# Patient Record
Sex: MALE | Race: WHITE | NOT HISPANIC OR LATINO | Employment: OTHER | URBAN - METROPOLITAN AREA
[De-identification: names, ages, dates, MRNs, and addresses within clinical notes are randomized per-mention and may not be internally consistent; named-entity substitution may affect disease eponyms.]

---

## 2017-01-11 ENCOUNTER — LAB VISIT (OUTPATIENT)
Dept: LAB | Facility: HOSPITAL | Age: 72
End: 2017-01-11
Attending: FAMILY MEDICINE
Payer: MEDICARE

## 2017-01-11 DIAGNOSIS — E78.5 HYPERLIPIDEMIA, UNSPECIFIED HYPERLIPIDEMIA TYPE: ICD-10-CM

## 2017-01-11 LAB
ALBUMIN SERPL BCP-MCNC: 3.8 G/DL
ALP SERPL-CCNC: 55 U/L
ALT SERPL W/O P-5'-P-CCNC: 26 U/L
ANION GAP SERPL CALC-SCNC: 5 MMOL/L
AST SERPL-CCNC: 22 U/L
BILIRUB SERPL-MCNC: 0.4 MG/DL
BUN SERPL-MCNC: 20 MG/DL
CALCIUM SERPL-MCNC: 9.5 MG/DL
CHLORIDE SERPL-SCNC: 107 MMOL/L
CHOLEST/HDLC SERPL: 2.5 {RATIO}
CO2 SERPL-SCNC: 30 MMOL/L
CREAT SERPL-MCNC: 1 MG/DL
EST. GFR  (AFRICAN AMERICAN): >60 ML/MIN/1.73 M^2
EST. GFR  (NON AFRICAN AMERICAN): >60 ML/MIN/1.73 M^2
GLUCOSE SERPL-MCNC: 93 MG/DL
HDL/CHOLESTEROL RATIO: 40.2 %
HDLC SERPL-MCNC: 132 MG/DL
HDLC SERPL-MCNC: 53 MG/DL
LDLC SERPL CALC-MCNC: 67.4 MG/DL
NONHDLC SERPL-MCNC: 79 MG/DL
POTASSIUM SERPL-SCNC: 4.6 MMOL/L
PROT SERPL-MCNC: 7.1 G/DL
SODIUM SERPL-SCNC: 142 MMOL/L
TRIGL SERPL-MCNC: 58 MG/DL

## 2017-01-11 PROCEDURE — 36415 COLL VENOUS BLD VENIPUNCTURE: CPT | Mod: PO

## 2017-01-11 PROCEDURE — 80061 LIPID PANEL: CPT

## 2017-01-11 PROCEDURE — 80053 COMPREHEN METABOLIC PANEL: CPT

## 2017-01-16 ENCOUNTER — OFFICE VISIT (OUTPATIENT)
Dept: FAMILY MEDICINE | Facility: CLINIC | Age: 72
End: 2017-01-16
Payer: MEDICARE

## 2017-01-16 VITALS
TEMPERATURE: 98 F | DIASTOLIC BLOOD PRESSURE: 70 MMHG | WEIGHT: 168 LBS | HEART RATE: 86 BPM | HEIGHT: 66 IN | OXYGEN SATURATION: 98 % | BODY MASS INDEX: 27 KG/M2 | SYSTOLIC BLOOD PRESSURE: 136 MMHG

## 2017-01-16 DIAGNOSIS — J44.9 CHRONIC OBSTRUCTIVE PULMONARY DISEASE, UNSPECIFIED COPD TYPE: Primary | ICD-10-CM

## 2017-01-16 DIAGNOSIS — F17.200 TOBACCO USE DISORDER: ICD-10-CM

## 2017-01-16 DIAGNOSIS — E78.5 HYPERLIPIDEMIA, UNSPECIFIED HYPERLIPIDEMIA TYPE: ICD-10-CM

## 2017-01-16 PROCEDURE — 99214 OFFICE O/P EST MOD 30 MIN: CPT | Mod: S$GLB,,, | Performed by: FAMILY MEDICINE

## 2017-01-16 PROCEDURE — 1159F MED LIST DOCD IN RCRD: CPT | Mod: S$GLB,,, | Performed by: FAMILY MEDICINE

## 2017-01-16 PROCEDURE — 1126F AMNT PAIN NOTED NONE PRSNT: CPT | Mod: S$GLB,,, | Performed by: FAMILY MEDICINE

## 2017-01-16 PROCEDURE — 1157F ADVNC CARE PLAN IN RCRD: CPT | Mod: S$GLB,,, | Performed by: FAMILY MEDICINE

## 2017-01-16 PROCEDURE — 99499 UNLISTED E&M SERVICE: CPT | Mod: S$GLB,,, | Performed by: FAMILY MEDICINE

## 2017-01-16 PROCEDURE — 99999 PR PBB SHADOW E&M-EST. PATIENT-LVL III: CPT | Mod: PBBFAC,,, | Performed by: FAMILY MEDICINE

## 2017-01-16 PROCEDURE — 1160F RVW MEDS BY RX/DR IN RCRD: CPT | Mod: S$GLB,,, | Performed by: FAMILY MEDICINE

## 2017-01-16 RX ORDER — TRIAMCINOLONE ACETONIDE 1 MG/G
CREAM TOPICAL
Refills: 1 | COMMUNITY
Start: 2017-01-11 | End: 2017-12-21

## 2017-01-16 RX ORDER — FLUTICASONE FUROATE AND VILANTEROL 200; 25 UG/1; UG/1
1 POWDER RESPIRATORY (INHALATION) DAILY
Qty: 60 EACH | Refills: 11 | Status: SHIPPED | OUTPATIENT
Start: 2017-01-16 | End: 2018-02-11 | Stop reason: SDUPTHER

## 2017-01-16 RX ORDER — TIOTROPIUM BROMIDE 18 UG/1
18 CAPSULE ORAL; RESPIRATORY (INHALATION) DAILY
Qty: 30 CAPSULE | Refills: 11 | Status: SHIPPED | OUTPATIENT
Start: 2017-01-16 | End: 2018-01-14 | Stop reason: SDUPTHER

## 2017-01-16 NOTE — PROGRESS NOTES
Subjective:       Patient ID: Castillo Anderson Jr. is a 71 y.o. male.    Chief Complaint: Hyperlipidemia (3 month follow up with labs prior)    HPI     Reviewed recent labs.  Lipids wnl.      Smoker since age 15.  Smokes 1 pack per day.  Has had a difficult time quitting.      Coughing up clear phelgm with wheeze x 2 years.            Review of Systems      Review of Systems   Constitutional: Negative for fever and chills.   HENT: Negative for hearing loss and neck stiffness.    Eyes: Negative for redness and itching.   Respiratory: Negative for choking.    Cardiovascular: Negative for chest pain and leg swelling.  Abdomen: Negative for abdominal pain and blood in stool.   Genitourinary: Negative for dysuria and flank pain.   Musculoskeletal: Negative for back pain and gait problem.   Neurological: Negative for light-headedness and headaches.   Hematological: Negative for adenopathy.   Psychiatric/Behavioral: Negative for behavioral problems.     Objective:      Physical Exam   HENT:   Head: Atraumatic.   Eyes: Conjunctivae are normal. Pupils are equal, round, and reactive to light.   Neck: Normal range of motion.   Cardiovascular: Normal rate and regular rhythm.    No murmur heard.  Pulmonary/Chest: Effort normal. He has wheezes.   Lymphadenopathy:     He has no cervical adenopathy.       Lab Visit on 01/11/2017   Component Date Value Ref Range Status    Sodium 01/11/2017 142  136 - 145 mmol/L Final    Potassium 01/11/2017 4.6  3.5 - 5.1 mmol/L Final    Chloride 01/11/2017 107  95 - 110 mmol/L Final    CO2 01/11/2017 30* 23 - 29 mmol/L Final    Glucose 01/11/2017 93  70 - 110 mg/dL Final    BUN, Bld 01/11/2017 20  8 - 23 mg/dL Final    Creatinine 01/11/2017 1.0  0.5 - 1.4 mg/dL Final    Calcium 01/11/2017 9.5  8.7 - 10.5 mg/dL Final    Total Protein 01/11/2017 7.1  6.0 - 8.4 g/dL Final    Albumin 01/11/2017 3.8  3.5 - 5.2 g/dL Final    Total Bilirubin 01/11/2017 0.4  0.1 - 1.0 mg/dL Final    Comment: For  infants and newborns, interpretation of results should be based  on gestational age, weight and in agreement with clinical  observations.  Premature Infant recommended reference ranges:  Up to 24 hours.............<8.0 mg/dL  Up to 48 hours............<12.0 mg/dL  3-5 days..................<15.0 mg/dL  6-29 days.................<15.0 mg/dL      Alkaline Phosphatase 01/11/2017 55  55 - 135 U/L Final    AST 01/11/2017 22  10 - 40 U/L Final    ALT 01/11/2017 26  10 - 44 U/L Final    Anion Gap 01/11/2017 5* 8 - 16 mmol/L Final    eGFR if African American 01/11/2017 >60.0  >60 mL/min/1.73 m^2 Final    eGFR if non African American 01/11/2017 >60.0  >60 mL/min/1.73 m^2 Final    Comment: Calculation used to obtain the estimated glomerular filtration  rate (eGFR) is the CKD-EPI equation. Since race is unknown   in our information system, the eGFR values for   -American and Non--American patients are given   for each creatinine result.      Cholesterol 01/11/2017 132  120 - 199 mg/dL Final    Comment: The National Cholesterol Education Program (NCEP) has set the  following guidelines (reference ranges) for Cholesterol:  Optimal.....................<200 mg/dL  Borderline High.............200-239 mg/dL  High........................> or = 240 mg/dL      Triglycerides 01/11/2017 58  30 - 150 mg/dL Final    Comment: The National Cholesterol Education Program (NCEP) has set the  following guidelines (reference values) for triglycerides:  Normal......................<150 mg/dL  Borderline High.............150-199 mg/dL  High........................200-499 mg/dL      HDL 01/11/2017 53  40 - 75 mg/dL Final    Comment: The National Cholesterol Education Program (NCEP) has set the  following guidelines (reference values) for HDL Cholesterol:  Low...............<40 mg/dL  Optimal...........>60 mg/dL      LDL Cholesterol 01/11/2017 67.4  63.0 - 159.0 mg/dL Final    Comment: The National Cholesterol Education  Program (NCEP) has set the  following guidelines (reference values) for LDL Cholesterol:  Optimal.......................<130 mg/dL  Borderline High...............130-159 mg/dL  High..........................160-189 mg/dL  Very High.....................>190 mg/dL      HDL/Chol Ratio 01/11/2017 40.2  20.0 - 50.0 % Final    Total Cholesterol/HDL Ratio 01/11/2017 2.5  2.0 - 5.0 Final    Non-HDL Cholesterol 01/11/2017 79  mg/dL Final    Comment: Risk category and Non-HDL cholesterol goals:  Coronary heart disease (CHD)or equivalent (10-year risk of CHD >20%):  Non-HDL cholesterol goal     <130 mg/dL  Two or more CHD risk factors and 10-year risk of CHD <= 20%:  Non-HDL cholesterol goal     <160 mg/dL  0 to 1 CHD risk factor:  Non-HDL cholesterol goal     <190 mg/dL             Assessment:       1. Chronic obstructive pulmonary disease, unspecified COPD type    2. Tobacco use disorder    3. Hyperlipidemia, unspecified hyperlipidemia type        Plan:       Chronic obstructive pulmonary disease, unspecified COPD type  -     CT Chest Lung Screening Low Dose; Future; Expected date: 1/16/17    Tobacco use disorder  -     CT Chest Lung Screening Low Dose; Future; Expected date: 1/16/17    Hyperlipidemia, unspecified hyperlipidemia type    Other orders  -     tiotropium (SPIRIVA) 18 mcg inhalation capsule; Inhale 1 capsule (18 mcg total) into the lungs once daily.  Dispense: 30 capsule; Refill: 11  -     fluticasone-vilanterol (BREO ELLIPTA) 200-25 mcg/dose DsDv diskus inhaler; Inhale 1 puff into the lungs once daily.  Dispense: 60 each; Refill: 11    plan:  See order  Start spiriva and breo

## 2017-01-16 NOTE — MR AVS SNAPSHOT
[facility]  [address] [address]  [facility] [address]  Phone: [phone]  Fax: [phone]                  Castillo Anderson Jr.   2017 9:00 AM   Office Visit    Description:  Male : 1945   Provider:  Frantz Lemos MD   Department:  [facility]           Reason for Visit     Hyperlipidemia           Diagnoses this Visit        Comments    Chronic obstructive pulmonary disease, unspecified COPD type    -  Primary     Tobacco use disorder         Hyperlipidemia, unspecified hyperlipidemia type                To Do List           Future Appointments        Provider Department Dept Phone    2017 8:30 AM [facility] CT1 LIMIT [address] [facility] [phone]    2017 8:00 AM Frantz Lemos MD [facility] [phone]      Goals (5 Years of Data)     None      Follow-Up and Disposition     Return in about 3 months (around 2017).       These Medications        Disp Refills Start End    tiotropium (SPIRIVA) 18 mcg inhalation capsule 30 capsule 11 2017    Inhale 1 capsule (18 mcg total) into the lungs once daily. - Inhalation    Pharmacy: [facility] [address] [place] [address] AT [facility] [address] [facility] [address] Ph #: [phone]       fluticasone-vilanterol (BREO ELLIPTA) 200-25 mcg/dose DsDv diskus inhaler 60 each 11 2017     Inhale 1 puff into the lungs once daily. - Inhalation    [facility] [id number] [place] [address] AT [facility] [address] & [facility] [address] Ph #: [phone]         [facility] On [facility]     [facility] Care [facility] -  Assistance  Registered nurses in the [facility] provide clinical advisement, health education, appointment booking, and other advisory services.  Call for this free service at [phone].             Medications           Message regarding Medications     Verify the changes and/or additions to your  medication regime listed below are the same as discussed with your clinician today.  If any of these changes or additions are incorrect, please notify your healthcare provider.        START taking these NEW medications        Refills    tiotropium (SPIRIVA) 18 mcg inhalation capsule 11    Sig: Inhale 1 capsule (18 mcg total) into the lungs once daily.    Class: Normal    Route: Inhalation    fluticasone-vilanterol (BREO ELLIPTA) 200-25 mcg/dose DsDv diskus inhaler 11    Sig: Inhale 1 puff into the lungs once daily.    Class: Normal    Route: Inhalation      STOP taking these medications     ondansetron (ZOFRAN-ODT) 8 MG TbDL     oxycodone-acetaminophen (PERCOCET) 5-325 mg per tablet            Verify that the below list of medications is an accurate representation of the medications you are currently taking.  If none reported, the list may be blank. If incorrect, please contact your healthcare provider. Carry this list with you in case of emergency.           Current Medications     aspirin (ECOTRIN) 81 MG EC tablet Take 81 mg by mouth every evening.     duloxetine (CYMBALTA) 60 MG capsule Take 60 mg by mouth once daily.    Instructed to take morning of surgery.    multivitamin (THERAGRAN) per tablet Take 1 tablet by mouth once daily.    rosuvastatin (CRESTOR) 20 MG tablet TAKE 1 TABLET ONE TIME DAILY    trazodone (DESYREL) 50 MG tablet Take 50 mg by mouth every evening. Every day    triamcinolone acetonide 0.1% (KENALOG) 0.1 % cream ELENA AA BID FOR 1 MONTH    ZETIA 10 mg tablet TAKE 1 TABLET ONE TIME DAILY    fluticasone-vilanterol (BREO ELLIPTA) 200-25 mcg/dose DsDv diskus inhaler Inhale 1 puff into the lungs once daily.    tiotropium (SPIRIVA) 18 mcg inhalation capsule Inhale 1 capsule (18 mcg total) into the lungs once daily.           Clinical Reference Information           Vital Signs - Last Recorded  Most recent update: 1/16/2017  9:10 AM by Loretta Persaud MA    BP Pulse Temp Ht Wt SpO2    136/70 86 97.8  "°F (36.6 °C) (Oral) 5' 6" (1.676 m) 76.2 kg (167 lb 15.9 oz) 98%    BMI                27.11 kg/m2          Blood Pressure          Most Recent Value    BP  136/70      Allergies as of 1/16/2017     No Known Drug Allergies      Immunizations Administered on Date of Encounter - 1/16/2017     None      Orders Placed During Today's Visit     Future Labs/Procedures Expected by Expires    CT Chest Lung Screening Low Dose  1/16/2017 1/16/2018      Smoking Cessation     If you would like to quit smoking:   You may be eligible for free services if you are a [place] resident and started smoking cigarettes before September 1, 1988.  Call the Smoking Cessation Trust (SCT) toll free at ([phone] 187-5476 or [phone] [phone].   Call 0-958-QUIT-NOW if you do not meet the above criteria.            "

## 2017-01-25 ENCOUNTER — HOSPITAL ENCOUNTER (OUTPATIENT)
Dept: RADIOLOGY | Facility: HOSPITAL | Age: 72
Discharge: HOME | End: 2017-01-25
Attending: FAMILY MEDICINE

## 2017-01-25 DIAGNOSIS — F17.200 TOBACCO USE DISORDER: ICD-10-CM

## 2017-01-25 DIAGNOSIS — J44.9 CHRONIC OBSTRUCTIVE PULMONARY DISEASE, UNSPECIFIED COPD TYPE: ICD-10-CM

## 2017-01-25 PROCEDURE — 76497 UNLISTED CT PROCEDURE: CPT | Mod: TC,PO

## 2017-01-27 ENCOUNTER — TELEPHONE (OUTPATIENT)
Dept: FAMILY MEDICINE | Facility: CLINIC | Age: 72
End: 2017-01-27

## 2017-01-27 ENCOUNTER — PATIENT MESSAGE (OUTPATIENT)
Dept: FAMILY MEDICINE | Facility: CLINIC | Age: 72
End: 2017-01-27

## 2017-01-27 NOTE — TELEPHONE ENCOUNTER
inform pt via phone that I reviewed the test results and note the followin.  Imaging findings suggest chronic obstructive airways disease/early emphysematous lung architecture with multiple scattered pulmonary nodules which range in size from 2-6.5 mm.  A followup examination in 3-6 months then at 9-12 months and 24 months (if no change) is recommended.    2.  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

## 2017-01-28 NOTE — TELEPHONE ENCOUNTER
Where does he go from here should he see a pulmonologist and what about the kidney and other areas wants to know what to do ? Not sure about while he is waiting to see you if he should see other specialist? Please advise ty

## 2017-01-30 ENCOUNTER — TELEPHONE (OUTPATIENT)
Dept: FAMILY MEDICINE | Facility: CLINIC | Age: 72
End: 2017-01-30

## 2017-01-30 NOTE — TELEPHONE ENCOUNTER
inform pt via phone:    He does not need to see a pulmonologist or nephrologist.      He does need to quit smoking. Please give him info on smoking cessation program.

## 2017-02-07 ENCOUNTER — CLINICAL SUPPORT (OUTPATIENT)
Dept: SMOKING CESSATION | Facility: CLINIC | Age: 72
End: 2017-02-07
Payer: COMMERCIAL

## 2017-02-07 DIAGNOSIS — F17.210 MODERATE SMOKER (20 OR LESS PER DAY): Primary | ICD-10-CM

## 2017-02-07 PROCEDURE — 99404 PREV MED CNSL INDIV APPRX 60: CPT | Mod: S$GLB,,, | Performed by: GENERAL PRACTICE

## 2017-02-07 PROCEDURE — 99999 PR PBB SHADOW E&M-EST. PATIENT-LVL II: CPT | Mod: PBBFAC,,,

## 2017-02-07 RX ORDER — VARENICLINE TARTRATE 0.5 (11)-1
KIT ORAL
Qty: 1 PACKAGE | Refills: 0 | Status: SHIPPED | OUTPATIENT
Start: 2017-02-07 | End: 2017-03-07

## 2017-02-07 RX ORDER — IBUPROFEN 200 MG
1 TABLET ORAL DAILY
Qty: 14 PATCH | Refills: 0 | Status: SHIPPED | OUTPATIENT
Start: 2017-02-07 | End: 2017-02-21 | Stop reason: ALTCHOICE

## 2017-02-07 NOTE — Clinical Note
Patient will be participating in weekly tobacco cessation meetings and will begin the prescribed tobacco cessation medication regime of the Chantix starter pack and a 21 mg patch. Patient has used Chantix,patches,lozenges before.

## 2017-02-07 NOTE — PROGRESS NOTES
Patient was seen for tobacco cessation quit 1. He is currently smoking 1 ppd and has had a successful quit for 10 months many years ago. He has used Chantix before and had vivid dreams but took it close to bedtime. Patient has also used patches,lozenges,hypnosis,reduction and cold turkey. He states none of these were effective but did find Chantix effective. Patient is motivated to quit due to health and cost. Patient has accepted invitation to group starting 2/22/17 and was prescribed Chantix starter box and a 21 mg patch.

## 2017-02-14 DIAGNOSIS — I65.23 BILATERAL CAROTID ARTERY STENOSIS: Primary | ICD-10-CM

## 2017-02-21 ENCOUNTER — CLINICAL SUPPORT (OUTPATIENT)
Dept: SMOKING CESSATION | Facility: CLINIC | Age: 72
End: 2017-02-21
Payer: COMMERCIAL

## 2017-02-21 DIAGNOSIS — F17.210 MODERATE SMOKER (20 OR LESS PER DAY): Primary | ICD-10-CM

## 2017-02-21 PROCEDURE — 99999 PR PBB SHADOW E&M-EST. PATIENT-LVL II: CPT | Mod: PBBFAC,,,

## 2017-02-21 PROCEDURE — 99407 BEHAV CHNG SMOKING > 10 MIN: CPT | Mod: S$GLB,,, | Performed by: GENERAL PRACTICE

## 2017-02-22 ENCOUNTER — CLINICAL SUPPORT (OUTPATIENT)
Dept: SMOKING CESSATION | Facility: CLINIC | Age: 72
End: 2017-02-22
Payer: COMMERCIAL

## 2017-02-22 DIAGNOSIS — F17.210 MODERATE SMOKER (20 OR LESS PER DAY): Primary | ICD-10-CM

## 2017-02-22 PROCEDURE — 99999 PR PBB SHADOW E&M-EST. PATIENT-LVL II: CPT | Mod: PBBFAC,,,

## 2017-02-22 PROCEDURE — 90853 GROUP PSYCHOTHERAPY: CPT | Mod: S$GLB,,, | Performed by: GENERAL PRACTICE

## 2017-02-22 NOTE — Clinical Note
Patient is currently smoking 15 cigarettes per day and taking Chantix 1 mg BID with no negative side effects at this time. Patient will use reduction and delay to achieve his quit.

## 2017-02-22 NOTE — PROGRESS NOTES
Site: [facility]  Date:  2/22/2017  Clinical Status of Patient: Outpatient   Length of Service and Code: 60 minutes - 98419   Number in Attendance: 7  Group Activities/Focus of Group:  orientation, client introductions, completion of TCRS (Tobacco Cessation Rating Scale) learned addiction model, cues/triggers, personal reasons for quitting, medications, goals, quit date    Target symptoms:  withdrawal and medication side effects             The following were rated moderate (3) to severe (4) on TCRS:       Moderate 3: none     Severe 4:   none  Patient's Response to Intervention: Patient is currently smoking 15 cigarettes per day and taking Chantix 1 mg BID with no negative side effects at this time. Patient will use reduction and delay to achieve his quit.  Progress Toward Goals and Other Mental Status Changes: The patient denies any abnormal behavioral or mental changes at this time.         Diagnosis: Z72.0  Plan: The patient will continue with group therapy sessions and medication monitoring by CTTS. Prescribed medication management will be by physician.   Return to Clinic: 1 week

## 2017-03-01 ENCOUNTER — HOSPITAL ENCOUNTER (OUTPATIENT)
Dept: RADIOLOGY | Facility: HOSPITAL | Age: 72
Discharge: HOME | End: 2017-03-01
Attending: THORACIC SURGERY (CARDIOTHORACIC VASCULAR SURGERY)
Payer: MEDICARE

## 2017-03-01 DIAGNOSIS — I65.23 BILATERAL CAROTID ARTERY STENOSIS: ICD-10-CM

## 2017-03-01 PROCEDURE — 93880 EXTRACRANIAL BILAT STUDY: CPT | Mod: TC,PO

## 2017-03-01 PROCEDURE — 93880 EXTRACRANIAL BILAT STUDY: CPT | Mod: 26,,, | Performed by: RADIOLOGY

## 2017-03-02 ENCOUNTER — TELEPHONE (OUTPATIENT)
Dept: SMOKING CESSATION | Facility: CLINIC | Age: 72
End: 2017-03-02

## 2017-03-08 ENCOUNTER — TELEPHONE (OUTPATIENT)
Dept: SMOKING CESSATION | Facility: CLINIC | Age: 72
End: 2017-03-08

## 2017-03-15 ENCOUNTER — TELEPHONE (OUTPATIENT)
Dept: SMOKING CESSATION | Facility: CLINIC | Age: 72
End: 2017-03-15

## 2017-04-19 ENCOUNTER — TELEPHONE (OUTPATIENT)
Dept: VASCULAR SURGERY | Facility: CLINIC | Age: 72
End: 2017-04-19

## 2017-04-19 NOTE — TELEPHONE ENCOUNTER
----- Message from Ligia Street sent at 4/19/2017  1:39 PM CDT -----  Having issues corroded artery / call [phone] states urgent

## 2017-04-24 ENCOUNTER — TELEPHONE (OUTPATIENT)
Dept: VASCULAR SURGERY | Facility: CLINIC | Age: 72
End: 2017-04-24

## 2017-04-24 NOTE — TELEPHONE ENCOUNTER
----- Message from Ute Vallejo sent at 4/24/2017  4:08 PM CDT -----  Returning your call.  Please call patient at [phone].

## 2017-04-25 ENCOUNTER — OFFICE VISIT (OUTPATIENT)
Dept: FAMILY MEDICINE | Facility: CLINIC | Age: 72
End: 2017-04-25
Payer: MEDICARE

## 2017-04-25 VITALS
HEART RATE: 75 BPM | DIASTOLIC BLOOD PRESSURE: 72 MMHG | HEIGHT: 66 IN | SYSTOLIC BLOOD PRESSURE: 124 MMHG | BODY MASS INDEX: 26.36 KG/M2 | OXYGEN SATURATION: 99 % | WEIGHT: 164 LBS

## 2017-04-25 DIAGNOSIS — E78.5 HYPERLIPIDEMIA, UNSPECIFIED HYPERLIPIDEMIA TYPE: ICD-10-CM

## 2017-04-25 DIAGNOSIS — F41.9 ANXIETY: ICD-10-CM

## 2017-04-25 DIAGNOSIS — J44.9 CHRONIC OBSTRUCTIVE PULMONARY DISEASE, UNSPECIFIED COPD TYPE: Primary | ICD-10-CM

## 2017-04-25 DIAGNOSIS — F17.200 TOBACCO USE DISORDER: ICD-10-CM

## 2017-04-25 PROCEDURE — 1160F RVW MEDS BY RX/DR IN RCRD: CPT | Mod: S$GLB,,, | Performed by: FAMILY MEDICINE

## 2017-04-25 PROCEDURE — 99214 OFFICE O/P EST MOD 30 MIN: CPT | Mod: 25,S$GLB,, | Performed by: FAMILY MEDICINE

## 2017-04-25 PROCEDURE — 99999 PR PBB SHADOW E&M-EST. PATIENT-LVL III: CPT | Mod: PBBFAC,,, | Performed by: FAMILY MEDICINE

## 2017-04-25 PROCEDURE — 90670 PCV13 VACCINE IM: CPT | Mod: S$GLB,,, | Performed by: FAMILY MEDICINE

## 2017-04-25 PROCEDURE — 1126F AMNT PAIN NOTED NONE PRSNT: CPT | Mod: S$GLB,,, | Performed by: FAMILY MEDICINE

## 2017-04-25 PROCEDURE — 99499 UNLISTED E&M SERVICE: CPT | Mod: S$GLB,,, | Performed by: FAMILY MEDICINE

## 2017-04-25 PROCEDURE — 1159F MED LIST DOCD IN RCRD: CPT | Mod: S$GLB,,, | Performed by: FAMILY MEDICINE

## 2017-04-25 PROCEDURE — G0009 ADMIN PNEUMOCOCCAL VACCINE: HCPCS | Mod: S$GLB,,, | Performed by: FAMILY MEDICINE

## 2017-04-25 NOTE — MR AVS SNAPSHOT
[facility]  [address]  [place] [address]  Phone: [phone]  Fax: [phone]                  Castillo Anderson Jr.   2017 8:00 AM   Office Visit    Description:  Male : 1945   Provider:  Frantz Lemos MD   Department:  [facility]           Reason for Visit     COPD           Diagnoses this Visit        Comments    Chronic obstructive pulmonary disease, unspecified COPD type    -  Primary     Tobacco use disorder         Anxiety         Hyperlipidemia, unspecified hyperlipidemia type                To Do List           Goals (5 Years of Data)     None      Follow-Up and Disposition     Return in about 1 year (around 2018).      [facility] On Call     [facility] Nurse Care Line -  Assistance  Unless otherwise directed by your provider, please contact [facility], our nurse care line that is available for  assistance.     Registered nurses in the [facility] provide: appointment scheduling, clinical advisement, health education, and other advisory services.  Call: [phone] (toll free)               Medications           Message regarding Medications     Verify the changes and/or additions to your medication regime listed below are the same as discussed with your clinician today.  If any of these changes or additions are incorrect, please notify your healthcare provider.        STOP taking these medications     aspirin (ECOTRIN) 81 MG EC tablet Take 81 mg by mouth every evening.            Verify that the below list of medications is an accurate representation of the medications you are currently taking.  If none reported, the list may be blank. If incorrect, please contact your healthcare provider. Carry this list with you in case of emergency.           Current Medications     fluticasone-vilanterol (BREO ELLIPTA) 200-25 mcg/dose DsDv diskus inhaler Inhale 1 puff into the lungs once daily.    multivitamin  "(THERAGRAN) per tablet Take 1 tablet by mouth once daily.    rosuvastatin (CRESTOR) 20 MG tablet TAKE 1 TABLET ONE TIME DAILY    tiotropium (SPIRIVA) 18 mcg inhalation capsule Inhale 1 capsule (18 mcg total) into the lungs once daily.    trazodone (DESYREL) 50 MG tablet Take 50 mg by mouth every evening. Every day    triamcinolone acetonide 0.1% (KENALOG) 0.1 % cream ELENA AA BID FOR 1 MONTH    ZETIA 10 mg tablet TAKE 1 TABLET ONE TIME DAILY    duloxetine (CYMBALTA) 60 MG capsule Take 60 mg by mouth once daily.    Instructed to take morning of surgery.           Clinical Reference Information           Your Vitals Were     BP Pulse Height Weight SpO2 BMI    124/72 75 5' 6" (1.676 m) 74.4 kg (164 lb 0.4 oz) 99% 26.47 kg/m2      Blood Pressure          Most Recent Value    BP  124/72      Allergies as of 4/25/2017     No Known Drug Allergies      Immunizations Administered on Date of Encounter - 4/25/2017     Name Date Dose VIS Date Route    Pneumococcal Conjugate - 13 Valent 4/25/2017 0.5 mL 11/5/2015 Intramuscular      Orders Placed During Today's Visit      Normal Orders This Visit    Pneumococcal Conjugate Vaccine (13 Valent) (IM)       Smoking Cessation     If you would like to quit smoking:   You may be eligible for free services if you are a [place] resident and started smoking cigarettes before September 1, 1988.  Call the Smoking Cessation Trust ([facility]) toll free at ([phone] 257-7336 or (999) 517-9435.   Call 1-800-QUIT-NOW if you do not meet the above criteria.   Contact us via email: [email or web address]   View our website for more information: [email or web address]        Language Assistance Services     ATTENTION: Language assistance services are available, free of charge. Please call [phone].      ATENCIÓN: Si habla español, tiene a dean disposición servicios gratuitos de asistencia lingüística. Llame al [phone].     CHÚ Ý: N?u b?n nói Ti?ng Vi?t, có các d?ch v? h? tr? ngôn ng? mi?n " phí dành cho b?n. G?i s? [phone].         [facility] complies with applicable Federal civil rights laws and does not discriminate on the basis of race, color, national origin, age, disability, or sex.

## 2017-04-25 NOTE — PROGRESS NOTES
Subjective:       Patient ID: Castillo Anderson Jr. is a 71 y.o. male.    Chief Complaint: COPD    HPI     Smoker since age 15. Smokes 1 pack per day. Started him on spiriva and breo 3 months ago.  Reports less frequent dry cough. No wheeze.     Seeing Dr. Doron Cisneros, psychiatrist, for anxiety management. On cymbalta and trazodone.       Review of Systems      Review of Systems   Constitutional: Negative for fever and chills.   HENT: Negative for hearing loss and neck stiffness.    Eyes: Negative for redness and itching.   Respiratory: Negative for choking.    Cardiovascular: Negative for chest pain and leg swelling.  Abdomen: Negative for abdominal pain and blood in stool.   Genitourinary: Negative for dysuria and flank pain.   Musculoskeletal: Negative for back pain and gait problem.   Neurological: Negative for light-headedness and headaches.   Hematological: Negative for adenopathy.   Psychiatric/Behavioral: Negative for behavioral problems.     Objective:      Physical Exam   HENT:   Head: Atraumatic.   Eyes: Conjunctivae are normal. Pupils are equal, round, and reactive to light.   Neck: Normal range of motion.   Cardiovascular: Normal rate and regular rhythm.    No murmur heard.  Pulmonary/Chest: Effort normal and breath sounds normal. He has no wheezes.   Lymphadenopathy:     He has no cervical adenopathy.       Assessment:       1. Chronic obstructive pulmonary disease, unspecified COPD type    2. Tobacco use disorder    3. Anxiety    4. Hyperlipidemia, unspecified hyperlipidemia type        Plan:       Chronic obstructive pulmonary disease, unspecified COPD type    Tobacco use disorder    Anxiety    Hyperlipidemia, unspecified hyperlipidemia type    Other orders  -     Pneumococcal Conjugate Vaccine (13 Valent) (IM)      Plan:  prevnar 13 today  Cont current meds

## 2017-07-10 ENCOUNTER — TELEPHONE (OUTPATIENT)
Dept: FAMILY MEDICINE | Facility: CLINIC | Age: 72
End: 2017-07-10

## 2017-07-10 NOTE — TELEPHONE ENCOUNTER
----- Message from Laurence Johnson sent at 7/10/2017  9:14 AM CDT -----  Contact: [phone]  Patient requesting a refill on spiriva and breo.    Patient will be using   [facility] [address] [place] [address] AT [facility] [address] [place] [id number]  [address] [place] [address]  Phone: [phone] Fax: [phone]    Please call patient at [phone]. Thanks!

## 2017-07-18 DIAGNOSIS — E78.5 HLD (HYPERLIPIDEMIA): ICD-10-CM

## 2017-07-19 RX ORDER — EZETIMIBE 10 MG
TABLET ORAL
Qty: 90 TABLET | Refills: 0 | Status: SHIPPED | OUTPATIENT
Start: 2017-07-19 | End: 2017-12-18 | Stop reason: SDUPTHER

## 2017-07-27 ENCOUNTER — TELEPHONE (OUTPATIENT)
Dept: VASCULAR SURGERY | Facility: CLINIC | Age: 72
End: 2017-07-27

## 2017-07-27 NOTE — TELEPHONE ENCOUNTER
----- Message from Lou Coto sent at 7/27/2017 10:20 AM CDT -----  Contact: pt  Pt is wanting to speak to you can be reached at [phone]

## 2017-08-14 ENCOUNTER — TELEPHONE (OUTPATIENT)
Dept: VASCULAR SURGERY | Facility: CLINIC | Age: 72
End: 2017-08-14

## 2017-08-14 NOTE — TELEPHONE ENCOUNTER
----- Message from Dakota Casas sent at 8/14/2017  8:32 AM CDT -----  Contact: same  Patient called in and requested a message be sent over regarding getting his test/lab results from a facility in [place] and wanted to make sure they were received.  Patient call back number is [phone]

## 2017-09-05 RX ORDER — ROSUVASTATIN CALCIUM 20 MG/1
TABLET, COATED ORAL
Qty: 90 TABLET | Refills: 3 | Status: SHIPPED | OUTPATIENT
Start: 2017-09-05 | End: 2018-02-28 | Stop reason: SDUPTHER

## 2017-12-18 DIAGNOSIS — E78.5 HLD (HYPERLIPIDEMIA): ICD-10-CM

## 2017-12-18 RX ORDER — EZETIMIBE 10 MG/1
TABLET ORAL
Qty: 90 TABLET | Refills: 3 | Status: SHIPPED | OUTPATIENT
Start: 2017-12-18 | End: 2018-10-30 | Stop reason: SDUPTHER

## 2017-12-21 ENCOUNTER — OFFICE VISIT (OUTPATIENT)
Dept: FAMILY MEDICINE | Facility: CLINIC | Age: 72
End: 2017-12-21
Payer: MEDICARE

## 2017-12-21 ENCOUNTER — LAB VISIT (OUTPATIENT)
Dept: LAB | Facility: HOSPITAL | Age: 72
End: 2017-12-21
Attending: FAMILY MEDICINE
Payer: MEDICARE

## 2017-12-21 VITALS
DIASTOLIC BLOOD PRESSURE: 70 MMHG | WEIGHT: 171.5 LBS | SYSTOLIC BLOOD PRESSURE: 130 MMHG | HEIGHT: 66 IN | BODY MASS INDEX: 27.56 KG/M2 | HEART RATE: 93 BPM | OXYGEN SATURATION: 98 %

## 2017-12-21 DIAGNOSIS — Z12.5 PROSTATE CANCER SCREENING: ICD-10-CM

## 2017-12-21 DIAGNOSIS — F32.A DEPRESSION, UNSPECIFIED DEPRESSION TYPE: ICD-10-CM

## 2017-12-21 DIAGNOSIS — J44.9 CHRONIC OBSTRUCTIVE PULMONARY DISEASE, UNSPECIFIED COPD TYPE: ICD-10-CM

## 2017-12-21 DIAGNOSIS — Z00.00 ROUTINE MEDICAL EXAM: Primary | ICD-10-CM

## 2017-12-21 LAB — COMPLEXED PSA SERPL-MCNC: 3.6 NG/ML

## 2017-12-21 PROCEDURE — 36415 COLL VENOUS BLD VENIPUNCTURE: CPT | Mod: PO

## 2017-12-21 PROCEDURE — 99397 PER PM REEVAL EST PAT 65+ YR: CPT | Mod: S$GLB,,, | Performed by: FAMILY MEDICINE

## 2017-12-21 PROCEDURE — 99999 PR PBB SHADOW E&M-EST. PATIENT-LVL III: CPT | Mod: PBBFAC,,, | Performed by: FAMILY MEDICINE

## 2017-12-21 PROCEDURE — 99499 UNLISTED E&M SERVICE: CPT | Mod: S$GLB,,, | Performed by: FAMILY MEDICINE

## 2017-12-21 PROCEDURE — 84153 ASSAY OF PSA TOTAL: CPT

## 2017-12-21 RX ORDER — CALCIPOTRIENE, BETAMETHASONE DIPROPIONATE 50; .643 UG/G; MG/G
OINTMENT TOPICAL
COMMUNITY
Start: 2017-12-11 | End: 2019-09-09 | Stop reason: HOSPADM

## 2017-12-21 RX ORDER — IBUPROFEN 800 MG/1
800 TABLET ORAL 2 TIMES DAILY
COMMUNITY
Start: 2017-12-19 | End: 2018-01-25 | Stop reason: ALTCHOICE

## 2017-12-21 RX ORDER — CLOPIDOGREL BISULFATE 75 MG/1
75 TABLET ORAL DAILY
COMMUNITY
Start: 2017-11-14 | End: 2019-03-28 | Stop reason: SDUPTHER

## 2017-12-21 NOTE — PROGRESS NOTES
Subjective:       Patient ID: Castillo Anderson Jr. is a 72 y.o. male.    Chief Complaint: Annual Exam    HPI     Here for a check up.    Copd stable. No worsening cough or wheeze    Depression stable.       Review of Systems      Review of Systems   Constitutional: Negative for fever and chills.   HENT: Negative for hearing loss and neck stiffness.    Eyes: Negative for redness and itching.   Respiratory: Negative for cough and choking.    Cardiovascular: Negative for chest pain and leg swelling.  Abdomen: Negative for abdominal pain and blood in stool.   Genitourinary: Negative for dysuria and flank pain.   Musculoskeletal: Negative for back pain and gait problem.   Neurological: Negative for light-headedness and headaches.   Hematological: Negative for adenopathy.   Psychiatric/Behavioral: Negative for behavioral problems.     Objective:      Physical Exam   Constitutional: He is oriented to person, place, and time. He appears well-developed and well-nourished.   HENT:   Head: Normocephalic and atraumatic.   Eyes: Conjunctivae are normal. Pupils are equal, round, and reactive to light.   Neck: Normal range of motion. Neck supple.   Cardiovascular: Normal rate, regular rhythm and normal heart sounds.  Exam reveals no friction rub.    No murmur heard.  Pulmonary/Chest: Effort normal and breath sounds normal.   Abdominal: Soft. Bowel sounds are normal. There is no tenderness.   Musculoskeletal: Normal range of motion.   Lymphadenopathy:     He has no cervical adenopathy.   Neurological: He is alert and oriented to person, place, and time. He has normal reflexes. No cranial nerve deficit. Coordination normal.   Skin: Skin is warm and dry.   Psychiatric: He has a normal mood and affect. His behavior is normal.       Assessment:       1. Routine medical exam    2. Prostate cancer screening    3. Chronic obstructive pulmonary disease, unspecified COPD type    4. Depression, unspecified depression type        Plan:        Routine medical exam    Prostate cancer screening  -     PSA, Screening; Future; Expected date: 12/21/2017    Chronic obstructive pulmonary disease, unspecified COPD type    Depression, unspecified depression type            Plan:  psa today  Cont current meds      Medication List with Changes/Refills   Current Medications    CALCIPOTRIENE-BETAMETHASONE (TACLONEX) OINTMENT        CLOPIDOGREL (PLAVIX) 75 MG TABLET    Take 75 mg by mouth once daily.    DULOXETINE (CYMBALTA) 60 MG CAPSULE    Take 60 mg by mouth once daily.    Instructed to take morning of surgery.    EZETIMIBE (ZETIA) 10 MG TABLET    TAKE 1 TABLET EVERY DAY    FLUTICASONE-VILANTEROL (BREO ELLIPTA) 200-25 MCG/DOSE DSDV DISKUS INHALER    Inhale 1 puff into the lungs once daily.    IBUPROFEN (ADVIL,MOTRIN) 800 MG TABLET    Take 800 mg by mouth 2 (two) times daily.    ROSUVASTATIN (CRESTOR) 20 MG TABLET    TAKE 1 TABLET BY MOUTH EVERY DAY    TIOTROPIUM (SPIRIVA) 18 MCG INHALATION CAPSULE    Inhale 1 capsule (18 mcg total) into the lungs once daily.    TRAZODONE (DESYREL) 50 MG TABLET    Take 50 mg by mouth every evening. Every day   Discontinued Medications    MULTIVITAMIN (THERAGRAN) PER TABLET    Take 1 tablet by mouth once daily.    TRIAMCINOLONE ACETONIDE 0.1% (KENALOG) 0.1 % CREAM    ELENA AA BID FOR 1 MONTH

## 2018-01-14 RX ORDER — TIOTROPIUM BROMIDE 18 UG/1
CAPSULE ORAL; RESPIRATORY (INHALATION)
Qty: 30 CAPSULE | Refills: 11 | Status: SHIPPED | OUTPATIENT
Start: 2018-01-14 | End: 2019-04-03 | Stop reason: SDUPTHER

## 2018-01-19 ENCOUNTER — TELEPHONE (OUTPATIENT)
Dept: SMOKING CESSATION | Facility: CLINIC | Age: 73
End: 2018-01-19

## 2018-01-23 ENCOUNTER — CLINICAL SUPPORT (OUTPATIENT)
Dept: SMOKING CESSATION | Facility: CLINIC | Age: 73
End: 2018-01-23
Payer: COMMERCIAL

## 2018-01-23 DIAGNOSIS — F17.200 NICOTINE DEPENDENCE: Primary | ICD-10-CM

## 2018-01-23 PROCEDURE — 99406 BEHAV CHNG SMOKING 3-10 MIN: CPT | Mod: S$GLB,,, | Performed by: INTERNAL MEDICINE

## 2018-01-25 ENCOUNTER — OFFICE VISIT (OUTPATIENT)
Dept: FAMILY MEDICINE | Facility: CLINIC | Age: 73
End: 2018-01-25
Payer: MEDICARE

## 2018-01-25 VITALS
WEIGHT: 169.56 LBS | SYSTOLIC BLOOD PRESSURE: 143 MMHG | BODY MASS INDEX: 27.25 KG/M2 | HEIGHT: 66 IN | DIASTOLIC BLOOD PRESSURE: 86 MMHG | HEART RATE: 101 BPM

## 2018-01-25 DIAGNOSIS — F17.200 TOBACCO DEPENDENCE: ICD-10-CM

## 2018-01-25 DIAGNOSIS — R91.8 PULMONARY NODULES: ICD-10-CM

## 2018-01-25 DIAGNOSIS — Z98.890 S/P CAROTID ENDARTERECTOMY: ICD-10-CM

## 2018-01-25 DIAGNOSIS — J44.9 CHRONIC OBSTRUCTIVE PULMONARY DISEASE, UNSPECIFIED COPD TYPE: ICD-10-CM

## 2018-01-25 DIAGNOSIS — I65.22 STENOSIS OF LEFT CAROTID ARTERY: ICD-10-CM

## 2018-01-25 DIAGNOSIS — I73.9 PVD (PERIPHERAL VASCULAR DISEASE): ICD-10-CM

## 2018-01-25 DIAGNOSIS — I70.0 ATHEROSCLEROSIS OF AORTA: ICD-10-CM

## 2018-01-25 DIAGNOSIS — I70.212 ATHEROSCLEROSIS OF NATIVE ARTERY OF LEFT LOWER EXTREMITY WITH INTERMITTENT CLAUDICATION: ICD-10-CM

## 2018-01-25 DIAGNOSIS — E78.5 HYPERLIPIDEMIA, UNSPECIFIED HYPERLIPIDEMIA TYPE: ICD-10-CM

## 2018-01-25 DIAGNOSIS — Z00.00 ENCOUNTER FOR PREVENTIVE HEALTH EXAMINATION: Primary | ICD-10-CM

## 2018-01-25 DIAGNOSIS — I65.22 ARTERIOSCLEROSIS OF LEFT CAROTID ARTERY: ICD-10-CM

## 2018-01-25 DIAGNOSIS — G47.33 OBSTRUCTIVE SLEEP APNEA ON CPAP: ICD-10-CM

## 2018-01-25 DIAGNOSIS — F33.1 MODERATE EPISODE OF RECURRENT MAJOR DEPRESSIVE DISORDER: ICD-10-CM

## 2018-01-25 PROCEDURE — G0439 PPPS, SUBSEQ VISIT: HCPCS | Mod: S$GLB,,, | Performed by: NURSE PRACTITIONER

## 2018-01-25 PROCEDURE — 99499 UNLISTED E&M SERVICE: CPT | Mod: S$GLB,,, | Performed by: NURSE PRACTITIONER

## 2018-01-25 PROCEDURE — 99999 PR PBB SHADOW E&M-EST. PATIENT-LVL V: CPT | Mod: PBBFAC,,, | Performed by: NURSE PRACTITIONER

## 2018-01-25 NOTE — PROGRESS NOTES
"Castillo Anderson presented for a  Medicare AWV and comprehensive Health Risk Assessment today. The following components were reviewed and updated:    · Medical history  · Family History  · Social history  · Allergies and Current Medications  · Health Risk Assessment  · Health Maintenance  · Care Team     Review of Systems   Constitutional: Negative for fever and malaise/fatigue.   Respiratory: Negative for cough and wheezing.    Cardiovascular: Negative for chest pain.   Gastrointestinal: Negative for abdominal pain, blood in stool, constipation, diarrhea, nausea and vomiting.   Skin: Negative for rash.   Neurological: Negative for dizziness, weakness and headaches.     ** See Completed Assessments for Annual Wellness Visit within the encounter summary.**     The following assessments were completed:  · Living Situation  · CAGE  · Depression Screening  · Timed Get Up and Go  · Whisper Test  · Cognitive Function Screening      · Nutrition Screening  · ADL Screening  · PAQ Screening    Vitals:    01/25/18 0807   BP: (!) 143/86   BP Location: Left arm   Patient Position: Sitting   BP Method: Medium (Automatic)   Pulse: 101   Weight: 76.9 kg (169 lb 8.5 oz)   Height: 5' 6" (1.676 m)     Body mass index is 27.36 kg/m².  Physical Exam   Constitutional: He is oriented to person, place, and time. He appears well-nourished.   Cardiovascular: Normal rate, regular rhythm, normal heart sounds and intact distal pulses.    Pulmonary/Chest: Effort normal and breath sounds normal.   Neurological: He is alert and oriented to person, place, and time.   Skin: Skin is warm and dry. No rash noted.   Vitals reviewed.        Diagnoses and health risks identified today and associated recommendations/orders:    1. Encounter for preventive health examination  Reviewed and discussed health maintenance.    - US Carotid Bilateral; Future  - CT Chest Without Contrast; Future    2. Chronic obstructive pulmonary disease, unspecified COPD " type  Stable- continue current treatment and follow up routinely with PCP   - CT Chest Without Contrast; Future    3. Pulmonary nodules  Stable- continue current treatment and follow up routinely with PCP   - CT Chest Without Contrast; Future    4. Moderate episode of recurrent major depressive disorder  Stable- continue current treatment and follow up routinely with PCP     5. Atherosclerosis of aorta  Stable- continue current treatment and follow up routinely with PCP     6. S/P carotid endarterectomy  Stable- continue current treatment and follow up routinely with PCP   - US Carotid Bilateral; Future    7. Arteriosclerosis of left carotid artery  Stable- continue current treatment and follow up routinely with PCP   - US Carotid Bilateral; Future    8. Stenosis of left carotid artery  Stable- continue current treatment and follow up routinely with PCP and CV surgery ()    9. Atherosclerosis of native artery of left lower extremity with intermittent claudication  Stable- continue current treatment and follow up routinely with PCP and CV surgery ()    10. Hyperlipidemia, unspecified hyperlipidemia type  Stable- continue current treatment and follow up routinely with PCP     11. PVD (peripheral vascular disease)  Stable- continue current treatment and follow up routinely with PCP     12. Obstructive sleep apnea on CPAP  Stable- continue current treatment and follow up routinely with PCP     13. Tobacco dependence  Smoking cessation discussed. No willing to quit at the moment.    I offered to discuss end of life issues, including information on how to make advance directives that the patient could use to name someone who would make medical decisions on their behalf if they became too ill to make themselves.    _X_Patient declined  ___Patient is interested, I provided paper work and offered to discuss.    Provided Castillo with a 5-10 year written screening schedule and personal prevention plan.  Recommendations were developed using the USPSTF age appropriate recommendations. Education, counseling, and referrals were provided as needed. After Visit Summary printed and given to patient which includes a list of additional screenings\tests needed.    Cindi Wood NP

## 2018-01-25 NOTE — PATIENT INSTRUCTIONS
Counseling and Referral of Other Preventative  (Italic type indicates deductible and co-insurance are waived)    Patient Name: Castillo Anderson  Today's Date: 1/25/2018    Health Maintenance       Date Due Completion Date    Colonoscopy 03/05/2018 3/5/2013    Pneumococcal (65+) (2 of 2 - PPSV23) 04/25/2018 4/25/2017    Lipid Panel 01/11/2022 1/11/2017    TETANUS VACCINE 09/29/2026 9/29/2016 (Declined)    Override on 9/29/2016: Declined        No orders of the defined types were placed in this encounter.    The following information is provided to all patients.  This information is to help you find resources for any of the problems found today that may be affecting your health:                Living healthy guide: www.[facility].[place].gov      Understanding Diabetes: www.diabetes.org      Eating healthy: www.cdc.gov/healthyweight      [facility] home safety checklist: www.cdc.gov/steadi/patient.html      Agency on Aging: www.goea.[place].gov      Alcoholics anonymous (AA): www.aa.org      Physical Activity: www.pravin.nih.gov/ah3hmvu      Tobacco use: www.quitwithusla.org

## 2018-01-30 ENCOUNTER — HOSPITAL ENCOUNTER (OUTPATIENT)
Dept: RADIOLOGY | Facility: HOSPITAL | Age: 73
Discharge: HOME | End: 2018-01-30
Attending: NURSE PRACTITIONER
Payer: MEDICARE

## 2018-01-30 DIAGNOSIS — Z00.00 ENCOUNTER FOR PREVENTIVE HEALTH EXAMINATION: ICD-10-CM

## 2018-01-30 DIAGNOSIS — I65.22 ARTERIOSCLEROSIS OF LEFT CAROTID ARTERY: ICD-10-CM

## 2018-01-30 DIAGNOSIS — Z98.890 S/P CAROTID ENDARTERECTOMY: ICD-10-CM

## 2018-01-30 DIAGNOSIS — R91.8 PULMONARY NODULES: ICD-10-CM

## 2018-01-30 DIAGNOSIS — J44.9 CHRONIC OBSTRUCTIVE PULMONARY DISEASE, UNSPECIFIED COPD TYPE: ICD-10-CM

## 2018-01-30 PROCEDURE — 93880 EXTRACRANIAL BILAT STUDY: CPT | Mod: 26,,, | Performed by: RADIOLOGY

## 2018-01-30 PROCEDURE — 71250 CT THORAX DX C-: CPT | Mod: 26,,, | Performed by: RADIOLOGY

## 2018-01-30 PROCEDURE — 71250 CT THORAX DX C-: CPT | Mod: TC,PO

## 2018-01-30 PROCEDURE — 93880 EXTRACRANIAL BILAT STUDY: CPT | Mod: TC,PO

## 2018-02-05 DIAGNOSIS — R91.1 LUNG NODULE: ICD-10-CM

## 2018-02-08 ENCOUNTER — HOSPITAL ENCOUNTER (OUTPATIENT)
Dept: RADIOLOGY | Facility: HOSPITAL | Age: 73
Discharge: HOME | End: 2018-02-08
Attending: FAMILY MEDICINE
Payer: MEDICARE

## 2018-02-08 DIAGNOSIS — R91.1 LUNG NODULE: ICD-10-CM

## 2018-02-12 RX ORDER — FLUTICASONE FUROATE AND VILANTEROL TRIFENATATE 200; 25 UG/1; UG/1
POWDER RESPIRATORY (INHALATION)
Qty: 60 EACH | Refills: 11 | Status: SHIPPED | OUTPATIENT
Start: 2018-02-12 | End: 2019-04-03 | Stop reason: SDUPTHER

## 2018-02-15 ENCOUNTER — TELEPHONE (OUTPATIENT)
Dept: FAMILY MEDICINE | Facility: CLINIC | Age: 73
End: 2018-02-15

## 2018-02-15 NOTE — TELEPHONE ENCOUNTER
----- Message from Mona Calzadaza sent at 2/15/2018  1:46 PM CST -----  Contact: Kaylah padilla/ [place] [facility] [phone]  She is calling to follow up on the request for a prescription for CPAP supplies.  It needs to state heated tubing. There was also a form that needed your signature and date.  Please let her know the status.  Thank you!

## 2018-02-15 NOTE — TELEPHONE ENCOUNTER
"Returned call to Macrina, notified that all paperwork has been signed and faxed to her this morning, and again right now. She received it. States that she needs an "actual Rx with CPAP supplies and heated tubing."    Rx filled out and given to DR. Lemos.   "

## 2018-02-21 ENCOUNTER — LAB VISIT (OUTPATIENT)
Dept: LAB | Facility: HOSPITAL | Age: 73
End: 2018-02-21
Attending: FAMILY MEDICINE
Payer: MEDICARE

## 2018-02-21 ENCOUNTER — OFFICE VISIT (OUTPATIENT)
Dept: FAMILY MEDICINE | Facility: CLINIC | Age: 73
End: 2018-02-21
Payer: MEDICARE

## 2018-02-21 VITALS
OXYGEN SATURATION: 97 % | SYSTOLIC BLOOD PRESSURE: 124 MMHG | WEIGHT: 171.31 LBS | BODY MASS INDEX: 27.53 KG/M2 | HEIGHT: 66 IN | DIASTOLIC BLOOD PRESSURE: 68 MMHG | HEART RATE: 85 BPM

## 2018-02-21 DIAGNOSIS — G47.00 INSOMNIA, UNSPECIFIED TYPE: ICD-10-CM

## 2018-02-21 DIAGNOSIS — J44.9 CHRONIC OBSTRUCTIVE PULMONARY DISEASE, UNSPECIFIED COPD TYPE: ICD-10-CM

## 2018-02-21 DIAGNOSIS — L65.9 ALOPECIA: ICD-10-CM

## 2018-02-21 DIAGNOSIS — L65.9 ALOPECIA: Primary | ICD-10-CM

## 2018-02-21 DIAGNOSIS — L29.9 PRURITUS: ICD-10-CM

## 2018-02-21 DIAGNOSIS — F33.1 MODERATE EPISODE OF RECURRENT MAJOR DEPRESSIVE DISORDER: ICD-10-CM

## 2018-02-21 PROCEDURE — 3008F BODY MASS INDEX DOCD: CPT | Mod: S$GLB,,, | Performed by: FAMILY MEDICINE

## 2018-02-21 PROCEDURE — 99499 UNLISTED E&M SERVICE: CPT | Mod: S$GLB,,, | Performed by: FAMILY MEDICINE

## 2018-02-21 PROCEDURE — 1126F AMNT PAIN NOTED NONE PRSNT: CPT | Mod: S$GLB,,, | Performed by: FAMILY MEDICINE

## 2018-02-21 PROCEDURE — 1159F MED LIST DOCD IN RCRD: CPT | Mod: S$GLB,,, | Performed by: FAMILY MEDICINE

## 2018-02-21 PROCEDURE — 36415 COLL VENOUS BLD VENIPUNCTURE: CPT | Mod: PO

## 2018-02-21 PROCEDURE — 99214 OFFICE O/P EST MOD 30 MIN: CPT | Mod: S$GLB,,, | Performed by: FAMILY MEDICINE

## 2018-02-21 PROCEDURE — 86038 ANTINUCLEAR ANTIBODIES: CPT

## 2018-02-21 PROCEDURE — 99999 PR PBB SHADOW E&M-EST. PATIENT-LVL III: CPT | Mod: PBBFAC,,, | Performed by: FAMILY MEDICINE

## 2018-02-21 RX ORDER — GABAPENTIN 100 MG/1
100 CAPSULE ORAL 3 TIMES DAILY
Qty: 90 CAPSULE | Refills: 11 | Status: SHIPPED | OUTPATIENT
Start: 2018-02-21 | End: 2018-04-04

## 2018-02-21 NOTE — PROGRESS NOTES
Subjective:       Patient ID: Castillo Anderson Jr. is a 72 y.o. male.    Chief Complaint: Rash    HPI     C/o alopecia below chin to neck area and left temporal scalp with generalized itch x 9 months.     Reports that his dermatologist gave him a steroid about 1 month ago which helped with the itch. Did not reverse the alopecia.     Copd stable. No worsening cough or wheeze     Depression stable while on cymbalta.     Insomnia stable while on trazodone.       Review of Systems      Review of Systems   Constitutional: Negative for fever and chills.   HENT: Negative for hearing loss and neck stiffness.    Eyes: Negative for redness and itching.   Respiratory: Negative for cough and choking.    Cardiovascular: Negative for chest pain and leg swelling.  Abdomen: Negative for abdominal pain and blood in stool.   Genitourinary: Negative for dysuria and flank pain.   Musculoskeletal: Negative for back pain and gait problem.   Neurological: Negative for light-headedness and headaches.   Hematological: Negative for adenopathy.   Psychiatric/Behavioral: Negative for behavioral problems.       Objective:      Physical Exam   HENT:   Head: Atraumatic.   Eyes: Conjunctivae are normal. Pupils are equal, round, and reactive to light.   Neck: Normal range of motion.   Cardiovascular: Normal rate and regular rhythm.    No murmur heard.  Pulmonary/Chest: Effort normal and breath sounds normal. He has no wheezes.   Lymphadenopathy:     He has no cervical adenopathy.   Skin:   Below chin area: hair loss noted.    Small amt of hair loss noted on left temporal area close to side burn.        Assessment:       1. Alopecia    2. Pruritus    3. Chronic obstructive pulmonary disease, unspecified COPD type    4. Moderate episode of recurrent major depressive disorder    5. Insomnia, unspecified type        Plan:       Alopecia  -     ROBYN; Future; Expected date: 02/21/2018    Pruritus    Chronic obstructive pulmonary disease, unspecified COPD  type    Moderate episode of recurrent major depressive disorder    Insomnia, unspecified type    Other orders  -     gabapentin (NEURONTIN) 100 MG capsule; Take 1 capsule (100 mg total) by mouth 3 (three) times daily.  Dispense: 90 capsule; Refill: 11          Plan:  Check shirley  Start otc zyrtec bid x 2 weeks. If no relief for the pruritus, then start neurontin  F/u with me in 6 weeks  Cont all other meds      Medication List with Changes/Refills   New Medications    GABAPENTIN (NEURONTIN) 100 MG CAPSULE    Take 1 capsule (100 mg total) by mouth 3 (three) times daily.   Current Medications    BREO ELLIPTA 200-25 MCG/DOSE DSDV DISKUS INHALER    INHALE 1 PUFF BY MOUTH EVERY DAY    CALCIPOTRIENE-BETAMETHASONE (TACLONEX) OINTMENT        CLOPIDOGREL (PLAVIX) 75 MG TABLET    Take 75 mg by mouth once daily.    DULOXETINE (CYMBALTA) 60 MG CAPSULE    Take 60 mg by mouth once daily.    Instructed to take morning of surgery.    EZETIMIBE (ZETIA) 10 MG TABLET    TAKE 1 TABLET EVERY DAY    ROSUVASTATIN (CRESTOR) 20 MG TABLET    TAKE 1 TABLET BY MOUTH EVERY DAY    SPIRIVA WITH HANDIHALER 18 MCG INHALATION CAPSULE    INHALE 1 CAPSULE VIA HANDIHALER ONCE DAILY AT THE SAME TIME EVERY DAY    TRAZODONE (DESYREL) 50 MG TABLET    Take 50 mg by mouth every evening. Every day

## 2018-02-22 LAB — ANA SER QL IF: NORMAL

## 2018-03-02 RX ORDER — ROSUVASTATIN CALCIUM 20 MG/1
TABLET, COATED ORAL
Qty: 90 TABLET | Refills: 2 | Status: SHIPPED | OUTPATIENT
Start: 2018-03-02 | End: 2018-09-07 | Stop reason: SDUPTHER

## 2018-03-20 ENCOUNTER — LAB VISIT (OUTPATIENT)
Dept: LAB | Facility: HOSPITAL | Age: 73
End: 2018-03-20
Attending: SPECIALIST
Payer: MEDICARE

## 2018-03-20 DIAGNOSIS — L20.89 OTHER ATOPIC DERMATITIS: ICD-10-CM

## 2018-03-20 DIAGNOSIS — L29.8 PRURITUS SENILIS: ICD-10-CM

## 2018-03-20 DIAGNOSIS — R21 RASH AND OTHER NONSPECIFIC SKIN ERUPTION: Primary | ICD-10-CM

## 2018-03-20 LAB
ALBUMIN SERPL BCP-MCNC: 4.1 G/DL
ALP SERPL-CCNC: 61 U/L
ALT SERPL W/O P-5'-P-CCNC: 20 U/L
ANION GAP SERPL CALC-SCNC: 8 MMOL/L
AST SERPL-CCNC: 18 U/L
BASOPHILS # BLD AUTO: 0.03 K/UL
BASOPHILS NFR BLD: 0.4 %
BILIRUB SERPL-MCNC: 0.4 MG/DL
BUN SERPL-MCNC: 13 MG/DL
CALCIUM SERPL-MCNC: 9.7 MG/DL
CHLORIDE SERPL-SCNC: 106 MMOL/L
CO2 SERPL-SCNC: 28 MMOL/L
CREAT SERPL-MCNC: 1 MG/DL
DIFFERENTIAL METHOD: ABNORMAL
EOSINOPHIL # BLD AUTO: 0.1 K/UL
EOSINOPHIL NFR BLD: 1.7 %
ERYTHROCYTE [DISTWIDTH] IN BLOOD BY AUTOMATED COUNT: 15 %
EST. GFR  (AFRICAN AMERICAN): >60 ML/MIN/1.73 M^2
EST. GFR  (NON AFRICAN AMERICAN): >60 ML/MIN/1.73 M^2
GLUCOSE SERPL-MCNC: 86 MG/DL
HCT VFR BLD AUTO: 50.8 %
HGB BLD-MCNC: 16.5 G/DL
IGE SERPL-ACNC: <35 IU/ML
IMM GRANULOCYTES # BLD AUTO: 0.05 K/UL
IMM GRANULOCYTES NFR BLD AUTO: 0.7 %
LYMPHOCYTES # BLD AUTO: 2.2 K/UL
LYMPHOCYTES NFR BLD: 31.3 %
MCH RBC QN AUTO: 27.9 PG
MCHC RBC AUTO-ENTMCNC: 32.5 G/DL
MCV RBC AUTO: 86 FL
MONOCYTES # BLD AUTO: 0.6 K/UL
MONOCYTES NFR BLD: 8.7 %
NEUTROPHILS # BLD AUTO: 4 K/UL
NEUTROPHILS NFR BLD: 57.2 %
NRBC BLD-RTO: 0 /100 WBC
PLATELET # BLD AUTO: 234 K/UL
PMV BLD AUTO: 9.4 FL
POTASSIUM SERPL-SCNC: 4.7 MMOL/L
PROT SERPL-MCNC: 7.3 G/DL
RBC # BLD AUTO: 5.91 M/UL
SODIUM SERPL-SCNC: 142 MMOL/L
T4 FREE SERPL-MCNC: 0.9 NG/DL
THYROPEROXIDASE IGG SERPL-ACNC: <6 IU/ML
TSH SERPL DL<=0.005 MIU/L-ACNC: 1.22 UIU/ML
WBC # BLD AUTO: 7.04 K/UL

## 2018-03-20 PROCEDURE — 86038 ANTINUCLEAR ANTIBODIES: CPT

## 2018-03-20 PROCEDURE — 82785 ASSAY OF IGE: CPT

## 2018-03-20 PROCEDURE — 86003 ALLG SPEC IGE CRUDE XTRC EA: CPT | Mod: 59

## 2018-03-20 PROCEDURE — 84439 ASSAY OF FREE THYROXINE: CPT

## 2018-03-20 PROCEDURE — 86800 THYROGLOBULIN ANTIBODY: CPT

## 2018-03-20 PROCEDURE — 86376 MICROSOMAL ANTIBODY EACH: CPT

## 2018-03-20 PROCEDURE — 86003 ALLG SPEC IGE CRUDE XTRC EA: CPT

## 2018-03-20 PROCEDURE — 84443 ASSAY THYROID STIM HORMONE: CPT

## 2018-03-20 PROCEDURE — 85025 COMPLETE CBC W/AUTO DIFF WBC: CPT

## 2018-03-20 PROCEDURE — 80053 COMPREHEN METABOLIC PANEL: CPT

## 2018-03-20 PROCEDURE — 86235 NUCLEAR ANTIGEN ANTIBODY: CPT

## 2018-03-21 LAB — ANA SER QL IF: NORMAL

## 2018-03-22 LAB
ALLERGEN WALNUT IGE: <0.35 KU/L
ALLERGEN WHEAT IGE: <0.35 KU/L
ANTI SM/RNP ANTIBODY: 1.28 EU
ANTI-SM/RNP INTERPRETATION: NEGATIVE
CLAM IGE QN: <0.35 KU/L
CODFISH IGE QN: <0.35 KU/L
CORN IGE QN: <0.35 KU/L
COW MILK IGE QN: <0.35 KU/L
DEPRECATED CLAM IGE RAST QL: NORMAL
DEPRECATED CODFISH IGE RAST QL: NORMAL
DEPRECATED CORN IGE RAST QL: NORMAL
DEPRECATED COW MILK IGE RAST QL: NORMAL
DEPRECATED EGG WHITE IGE RAST QL: NORMAL
DEPRECATED GLUTEN IGE RAST QL: NORMAL
DEPRECATED PEANUT IGE RAST QL: NORMAL
DEPRECATED SCALLOP IGE RAST QL: NORMAL
DEPRECATED SESAME SEED IGE RAST QL: NORMAL
DEPRECATED SHRIMP IGE RAST QL: NORMAL
DEPRECATED SOYBEAN IGE RAST QL: NORMAL
EGG WHITE IGE QN: <0.35 KU/L
GLUTEN IGE QN: <0.35 KU/L
PEANUT IGE QN: <0.35 KU/L
SCALLOP IGE QN: <0.35 KU/L
SESAME SEED IGE QN: <0.35 KU/L
SHRIMP IGE QN: <0.35 KU/L
SOYBEAN IGE QN: <0.35 KU/L
THRYOGLOBULIN INTERPRETATION: ABNORMAL
THYROGLOB AB SERPL-ACNC: <1.8 IU/ML
THYROGLOB SERPL-MCNC: 22 NG/ML
WALNUT CLASS: NORMAL
WHEAT CLASS: NORMAL

## 2018-03-27 ENCOUNTER — OFFICE VISIT (OUTPATIENT)
Dept: CARDIOLOGY | Facility: CLINIC | Age: 73
End: 2018-03-27
Payer: MEDICARE

## 2018-03-27 VITALS
HEART RATE: 108 BPM | DIASTOLIC BLOOD PRESSURE: 71 MMHG | HEIGHT: 66 IN | BODY MASS INDEX: 26.79 KG/M2 | WEIGHT: 166.69 LBS | SYSTOLIC BLOOD PRESSURE: 112 MMHG

## 2018-03-27 DIAGNOSIS — Z98.890 S/P CAROTID ENDARTERECTOMY: ICD-10-CM

## 2018-03-27 DIAGNOSIS — E78.5 HYPERLIPIDEMIA, UNSPECIFIED HYPERLIPIDEMIA TYPE: Primary | ICD-10-CM

## 2018-03-27 DIAGNOSIS — R07.9 CHEST PAIN, UNSPECIFIED TYPE: ICD-10-CM

## 2018-03-27 DIAGNOSIS — I73.9 PVD (PERIPHERAL VASCULAR DISEASE): ICD-10-CM

## 2018-03-27 DIAGNOSIS — J44.9 CHRONIC OBSTRUCTIVE PULMONARY DISEASE, UNSPECIFIED COPD TYPE: ICD-10-CM

## 2018-03-27 DIAGNOSIS — I65.22 ARTERIOSCLEROSIS OF LEFT CAROTID ARTERY: ICD-10-CM

## 2018-03-27 DIAGNOSIS — F17.200 TOBACCO DEPENDENCE: ICD-10-CM

## 2018-03-27 PROCEDURE — 99204 OFFICE O/P NEW MOD 45 MIN: CPT | Mod: S$GLB,,, | Performed by: INTERNAL MEDICINE

## 2018-03-27 PROCEDURE — 99499 UNLISTED E&M SERVICE: CPT | Mod: S$GLB,,, | Performed by: INTERNAL MEDICINE

## 2018-03-27 PROCEDURE — 99999 PR PBB SHADOW E&M-EST. PATIENT-LVL II: CPT | Mod: PBBFAC,,, | Performed by: INTERNAL MEDICINE

## 2018-03-27 PROCEDURE — 93000 ELECTROCARDIOGRAM COMPLETE: CPT | Mod: S$GLB,,, | Performed by: INTERNAL MEDICINE

## 2018-03-27 NOTE — LETTER
March 27, 2018      Frantz Lemos MD  [address] [facility] [address] [place] [address]           [facility]  [address] [facility] [address] [place] [address]  Phone: [phone]          Patient: Castillo Anderson Jr.   MR Number: [id number]   YOB: 1945   Date of Visit: 3/27/2018       Dear Dr. Frantz Lemos:    Thank you for referring Castillo Anderson to me for evaluation. Attached you will find relevant portions of my assessment and plan of care.    If you have questions, please do not hesitate to call me. I look forward to following Castillo Anderson along with you.    Sincerely,    Doron Toney MD    Enclosure  CC:  No Recipients    If you would like to receive this communication electronically, please contact [email or web address] or [phone] to request more information on [employer] access.    For providers and/or their staff who would like to refer a patient to [facility], please contact us through our one-stop-shop provider referral line, [facility], at [phone].    If you feel you have received this communication in error or would no longer like to receive these types of communications, please e-mail [email or web address]

## 2018-03-27 NOTE — PROGRESS NOTES
Subjective:    Patient ID:  Castillo Anderson Jr. is a 72 y.o. male who presents for evaluation of Hyperlipidemia (follow up); Carotid Artery Disease; PAD; and COPD      Pt with PAD, previous LE interventions and L CEA wanted to establish with a cardiologist. He had a recent 2016 normal SPECT stress and presently reports no new change in symptoms. He continues to smoke and has a degree of COPD with SOB. He has tried multiple times to quit smoking w/o success. He is active but has slowed a bit over the years.        Review of Systems   Constitution: Negative for weight gain and weight loss.   HENT: Negative.    Eyes: Negative.    Cardiovascular: Positive for dyspnea on exertion. Negative for chest pain, claudication, cyanosis, irregular heartbeat, leg swelling, near-syncope, orthopnea (no PND), palpitations and syncope.   Respiratory: Negative for cough, hemoptysis, shortness of breath and snoring.    Endocrine: Negative.    Skin: Negative.    Musculoskeletal: Negative for joint pain, muscle cramps, muscle weakness and myalgias.   Gastrointestinal: Negative for diarrhea, hematemesis, nausea and vomiting.   Genitourinary: Negative.    Neurological: Negative for dizziness, focal weakness, light-headedness, loss of balance, numbness, paresthesias and seizures.   Psychiatric/Behavioral: Negative.         Objective:    Physical Exam   Constitutional: He is oriented to person, place, and time. He appears well-developed and well-nourished.   HENT:   Mouth/Throat: Oropharynx is clear and moist.   Eyes: Pupils are equal, round, and reactive to light.   Neck: Normal range of motion. No thyromegaly present.   Cardiovascular: Regular rhythm, S1 normal, S2 normal, normal heart sounds, intact distal pulses and normal pulses.   No extrasystoles are present. Tachycardia present.  PMI is not displaced.  Exam reveals no friction rub.    No murmur heard.  Pulmonary/Chest: Effort normal. He has decreased breath sounds. He has no wheezes.  He has no rhonchi. He has no rales. He exhibits no tenderness.   Abdominal: Soft. Bowel sounds are normal. He exhibits no distension and no mass. There is no tenderness.   Musculoskeletal: Normal range of motion. He exhibits no edema.   Neurological: He is alert and oriented to person, place, and time.   Skin: Skin is warm and dry.   Vitals reviewed.      ECG - sinus tachycardia otherwise normal  Assessment:       1. Chronic obstructive pulmonary disease, unspecified COPD type    2. Arteriosclerosis of left carotid artery    3. PVD (peripheral vascular disease)    4. S/P carotid endarterectomy         Plan:       We discussed his PAD and COPD and he is aware of the need to stop smoking.  Will get Echo for a baseline  Continue present Rx  F/u here 6-12 months

## 2018-03-29 DIAGNOSIS — R07.9 CHEST PAIN, UNSPECIFIED TYPE: Primary | ICD-10-CM

## 2018-04-04 ENCOUNTER — OFFICE VISIT (OUTPATIENT)
Dept: UROLOGY | Facility: CLINIC | Age: 73
End: 2018-04-04
Payer: MEDICARE

## 2018-04-04 ENCOUNTER — CLINICAL SUPPORT (OUTPATIENT)
Dept: CARDIOLOGY | Facility: CLINIC | Age: 73
End: 2018-04-04
Attending: INTERNAL MEDICINE
Payer: MEDICARE

## 2018-04-04 ENCOUNTER — OFFICE VISIT (OUTPATIENT)
Dept: FAMILY MEDICINE | Facility: CLINIC | Age: 73
End: 2018-04-04
Payer: MEDICARE

## 2018-04-04 VITALS
DIASTOLIC BLOOD PRESSURE: 72 MMHG | OXYGEN SATURATION: 98 % | HEIGHT: 66 IN | HEART RATE: 84 BPM | BODY MASS INDEX: 27.14 KG/M2 | WEIGHT: 168.88 LBS | SYSTOLIC BLOOD PRESSURE: 122 MMHG

## 2018-04-04 VITALS
SYSTOLIC BLOOD PRESSURE: 122 MMHG | DIASTOLIC BLOOD PRESSURE: 60 MMHG | BODY MASS INDEX: 27.17 KG/M2 | HEART RATE: 74 BPM | HEIGHT: 66 IN | WEIGHT: 169.06 LBS

## 2018-04-04 DIAGNOSIS — L29.9 PRURITUS: ICD-10-CM

## 2018-04-04 DIAGNOSIS — J44.9 CHRONIC OBSTRUCTIVE PULMONARY DISEASE, UNSPECIFIED COPD TYPE: Primary | ICD-10-CM

## 2018-04-04 DIAGNOSIS — Z12.5 SCREENING FOR PROSTATE CANCER: ICD-10-CM

## 2018-04-04 DIAGNOSIS — N52.9 IMPOTENCE: ICD-10-CM

## 2018-04-04 DIAGNOSIS — J44.9 CHRONIC OBSTRUCTIVE PULMONARY DISEASE, UNSPECIFIED COPD TYPE: ICD-10-CM

## 2018-04-04 DIAGNOSIS — I73.9 PVD (PERIPHERAL VASCULAR DISEASE): ICD-10-CM

## 2018-04-04 DIAGNOSIS — L65.9 ALOPECIA: ICD-10-CM

## 2018-04-04 DIAGNOSIS — I65.22 ARTERIOSCLEROSIS OF LEFT CAROTID ARTERY: ICD-10-CM

## 2018-04-04 DIAGNOSIS — G47.00 INSOMNIA, UNSPECIFIED TYPE: ICD-10-CM

## 2018-04-04 DIAGNOSIS — Z80.42 FAMILY HISTORY OF PROSTATE CANCER: ICD-10-CM

## 2018-04-04 DIAGNOSIS — F33.1 MODERATE EPISODE OF RECURRENT MAJOR DEPRESSIVE DISORDER: ICD-10-CM

## 2018-04-04 DIAGNOSIS — F17.200 TOBACCO DEPENDENCE: ICD-10-CM

## 2018-04-04 DIAGNOSIS — R35.1 NOCTURIA MORE THAN TWICE PER NIGHT: ICD-10-CM

## 2018-04-04 DIAGNOSIS — N40.0 BENIGN PROSTATIC HYPERPLASIA, UNSPECIFIED WHETHER LOWER URINARY TRACT SYMPTOMS PRESENT: Primary | ICD-10-CM

## 2018-04-04 DIAGNOSIS — Z98.890 S/P CAROTID ENDARTERECTOMY: ICD-10-CM

## 2018-04-04 LAB
BILIRUB SERPL-MCNC: NORMAL MG/DL
BLOOD URINE, POC: NORMAL
COLOR, POC UA: YELLOW
GLUCOSE UR QL STRIP: NORMAL
KETONES UR QL STRIP: NORMAL
LEUKOCYTE ESTERASE URINE, POC: NORMAL
NITRITE, POC UA: NORMAL
PH, POC UA: 6.5
PROTEIN, POC: NORMAL
SPECIFIC GRAVITY, POC UA: 1.02
UROBILINOGEN, POC UA: NORMAL

## 2018-04-04 PROCEDURE — 99499 UNLISTED E&M SERVICE: CPT | Mod: S$PBB,,, | Performed by: FAMILY MEDICINE

## 2018-04-04 PROCEDURE — 81002 URINALYSIS NONAUTO W/O SCOPE: CPT | Mod: S$GLB,,, | Performed by: UROLOGY

## 2018-04-04 PROCEDURE — 93306 TTE W/DOPPLER COMPLETE: CPT | Mod: S$GLB,,, | Performed by: INTERNAL MEDICINE

## 2018-04-04 PROCEDURE — 99999 PR PBB SHADOW E&M-EST. PATIENT-LVL III: CPT | Mod: PBBFAC,,, | Performed by: UROLOGY

## 2018-04-04 PROCEDURE — 99204 OFFICE O/P NEW MOD 45 MIN: CPT | Mod: 25,S$GLB,, | Performed by: UROLOGY

## 2018-04-04 PROCEDURE — 99999 PR PBB SHADOW E&M-EST. PATIENT-LVL III: CPT | Mod: PBBFAC,,, | Performed by: FAMILY MEDICINE

## 2018-04-04 PROCEDURE — 99214 OFFICE O/P EST MOD 30 MIN: CPT | Mod: S$GLB,,, | Performed by: FAMILY MEDICINE

## 2018-04-04 NOTE — LETTER
April 4, 2018      Frantz Lemos MD  [address] [facility] [address] [place] [address]           [facility]  [address] [facility] [address] [place] [address]  Phone: [phone]          Patient: Castillo Anderson Jr.   MR Number: [id number]   YOB: 1945   Date of Visit: 4/4/2018       Dear Dr. Frantz Lemos:    Thank you for referring Castillo Anderson to me for evaluation. Attached you will find relevant portions of my assessment and plan of care.    If you have questions, please do not hesitate to call me. I look forward to following Castillo Anderson along with you.    Sincerely,    NICOLE Weinstein MD    Enclosure  CC:  No Recipients    If you would like to receive this communication electronically, please contact [email or web address] or [phone] to request more information on [employer] access.    For providers and/or their staff who would like to refer a patient to [facility], please contact us through our one-stop-shop provider referral line, [facility], at [phone].    If you feel you have received this communication in error or would no longer like to receive these types of communications, please e-mail [email or web address]

## 2018-04-04 NOTE — PROGRESS NOTES
Subjective:       Patient ID: Castillo Anderson Jr. is a 72 y.o. male.    Chief Complaint: Advice Only    HPI     72  year old with a family history of prostate cancer.  His father  of ACP at age 63.  He has been followed in the past by Dr. Brunner.  He is having more difficulty emptying his bladder.  He complains of daytime frequency.  No nocturia but takes meds to help him sleep.   No previous BPH medicine.  He drinks a lot of caffeine,  4-5 cups coffee per day.  He denies hematuria and dysuria.  Urine is clear.  He has ED.  Tried medical therapy and ineffective.  Also tried injection therapy was ineffective.   His PSA has been stable for years.  Urine dipstick shows negative for all components.    Component PSA   Latest Ref Rng & Units 0.00 - 4.00 ng/mL   2017 3.6   2016 3.1   2015 3.6   2015 4.4 (H)   2014 2.5   12/10/2013 3.3   2012 3.29   2011 3.1   2010 3.5   2008 3.5       Past Medical History:   Diagnosis Date    Cataract     OU    Claudication of lower extremity     Hx of mumps     Hyperlipidemia     Left carotid stenosis     Lumbar disc disorder     PVD (peripheral vascular disease)     Sleep apnea     uses cpap    Tobacco dependence      Past Surgical History:   Procedure Laterality Date    CAROTID ENDARTERECTOMY Left     after 2015    CAROTID ENDARTERECTOMY Left 2016    CATARACT EXTRACTION Bilateral     COMPUTED TOMOGRAPHY ANGIOGRAM Left 2016    Leg    EPIDURAL BLOCK INJECTION      FEMORAL ARTERY STENT  2010    FEMORAL ARTERY STENT Left 2016    LAMINECTOMY  2012    left sup fem and prox popliteal stents  9/30/15    x 2    TONSILLECTOMY      UVULECTOMY         Current Outpatient Prescriptions:     BREO ELLIPTA 200-25 mcg/dose DsDv diskus inhaler, INHALE 1 PUFF BY MOUTH EVERY DAY, Disp: 60 each, Rfl: 11    calcipotriene-betamethasone (TACLONEX) ointment, , Disp: , Rfl:     clopidogrel (PLAVIX) 75 mg tablet, Take 75  mg by mouth once daily., Disp: , Rfl:     duloxetine (CYMBALTA) 60 MG capsule, Take 60 mg by mouth once daily.    Instructed to take morning of surgery., Disp: , Rfl:     ezetimibe (ZETIA) 10 mg tablet, TAKE 1 TABLET EVERY DAY, Disp: 90 tablet, Rfl: 3    rosuvastatin (CRESTOR) 20 MG tablet, TAKE 1 TABLET BY MOUTH EVERY DAY, Disp: 90 tablet, Rfl: 2    SPIRIVA WITH HANDIHALER 18 mcg inhalation capsule, INHALE 1 CAPSULE VIA HANDIHALER ONCE DAILY AT THE SAME TIME EVERY DAY, Disp: 30 capsule, Rfl: 11    trazodone (DESYREL) 50 MG tablet, Take 50 mg by mouth every evening. Every day, Disp: , Rfl:       Review of Systems   Constitutional: Negative for fever.   Eyes: Negative for visual disturbance.   Respiratory: Negative for shortness of breath.    Cardiovascular: Negative for chest pain.   Gastrointestinal: Negative for nausea.   Genitourinary: Positive for frequency. Negative for dysuria and hematuria.   Musculoskeletal: Negative for gait problem.   Skin: Negative for rash.   Neurological: Negative for seizures.   Psychiatric/Behavioral: Negative for confusion.       Objective:      Physical Exam   Constitutional: He is oriented to person, place, and time. He appears well-developed and well-nourished.   HENT:   Head: Normocephalic and atraumatic.   Eyes: Conjunctivae are normal.   Cardiovascular: Normal rate.    Pulmonary/Chest: Effort normal.   Abdominal: Soft. He exhibits no distension. There is no tenderness. Hernia confirmed negative in the right inguinal area and confirmed negative in the left inguinal area.   Genitourinary: Testes normal and penis normal. Rectal exam shows no mass and anal tone normal. Prostate is enlarged (40g, s/s/a). Prostate is not tender.   Musculoskeletal: Normal range of motion. He exhibits no edema.   Neurological: He is alert and oriented to person, place, and time.   Skin: Skin is warm and dry. No rash noted.   Psychiatric: He has a normal mood and affect.   Vitals reviewed.       Assessment:       1. Benign prostatic hyperplasia, unspecified whether lower urinary tract symptoms present    2. Family history of prostate cancer    3. Impotence    4. Nocturia more than twice per night    5. Screening for prostate cancer        Plan:       Benign prostatic hyperplasia, unspecified whether lower urinary tract symptoms present  -     POCT URINE DIPSTICK WITHOUT MICROSCOPE    Family history of prostate cancer    Impotence    Nocturia more than twice per night    Screening for prostate cancer      We discussed treatment options for ED, specifically IPP.  He wants to consider.  PSA stable and GER benign, I recommend annual follow-up.  As for his voiding symptoms, I recommend decreased Caffeine intake.  Will consider beginning an alpha blocker if no satisfactory improvement.

## 2018-04-04 NOTE — PROGRESS NOTES
Subjective:       Patient ID: Castillo Anderson Jr. is a 72 y.o. male.    Chief Complaint: Hair Loss    HPI     Here for a f/u.    Saw an allergist approx 3 weeks ago for pruritus. Taking otc xyzal. Reports less generalized pruritus.      Copd stable. No worsening cough or wheeze.     Depression and insomnia stable.     No worsening alopecia on area below chin.  Not interested in treatment.     Review of Systems      Review of Systems   Constitutional: Negative for fever and chills.   HENT: Negative for hearing loss and neck stiffness.    Eyes: Negative for redness and itching.   Respiratory: Negative for cough and choking.    Cardiovascular: Negative for chest pain and leg swelling.  Abdomen: Negative for abdominal pain and blood in stool.   Genitourinary: Negative for dysuria and flank pain.   Musculoskeletal: Negative for back pain and gait problem.   Neurological: Negative for light-headedness and headaches.   Hematological: Negative for adenopathy.   Psychiatric/Behavioral: Negative for behavioral problems.     Objective:      Physical Exam   HENT:   Head: Atraumatic.   Eyes: Conjunctivae are normal. Pupils are equal, round, and reactive to light.   Neck: Normal range of motion.   Cardiovascular: Normal rate and regular rhythm.    No murmur heard.  Pulmonary/Chest: Effort normal and breath sounds normal. He has no wheezes.   Lymphadenopathy:     He has no cervical adenopathy.   Skin:   Below chin area: hair loss noted.       Assessment:       1. Chronic obstructive pulmonary disease, unspecified COPD type    2. Tobacco dependence    3. Moderate episode of recurrent major depressive disorder    4. Insomnia, unspecified type    5. Alopecia    6. Pruritus        Plan:       Chronic obstructive pulmonary disease, unspecified COPD type    Tobacco dependence    Moderate episode of recurrent major depressive disorder    Insomnia, unspecified type    Alopecia    Pruritus            Plan:  Cont current meds    Medication  List with Changes/Refills   Current Medications    BREO ELLIPTA 200-25 MCG/DOSE DSDV DISKUS INHALER    INHALE 1 PUFF BY MOUTH EVERY DAY    CALCIPOTRIENE-BETAMETHASONE (TACLONEX) OINTMENT        CLOPIDOGREL (PLAVIX) 75 MG TABLET    Take 75 mg by mouth once daily.    DULOXETINE (CYMBALTA) 60 MG CAPSULE    Take 60 mg by mouth once daily.    Instructed to take morning of surgery.    EZETIMIBE (ZETIA) 10 MG TABLET    TAKE 1 TABLET EVERY DAY    ROSUVASTATIN (CRESTOR) 20 MG TABLET    TAKE 1 TABLET BY MOUTH EVERY DAY    SPIRIVA WITH HANDIHALER 18 MCG INHALATION CAPSULE    INHALE 1 CAPSULE VIA HANDIHALER ONCE DAILY AT THE SAME TIME EVERY DAY    TRAZODONE (DESYREL) 50 MG TABLET    Take 50 mg by mouth every evening. Every day   Discontinued Medications    GABAPENTIN (NEURONTIN) 100 MG CAPSULE    Take 1 capsule (100 mg total) by mouth 3 (three) times daily.

## 2018-04-05 ENCOUNTER — PATIENT MESSAGE (OUTPATIENT)
Dept: CARDIOLOGY | Facility: CLINIC | Age: 73
End: 2018-04-05

## 2018-04-05 ENCOUNTER — TELEPHONE (OUTPATIENT)
Dept: CARDIOLOGY | Facility: CLINIC | Age: 73
End: 2018-04-05

## 2018-04-05 LAB
DIASTOLIC DYSFUNCTION: NO
ESTIMATED PA SYSTOLIC PRESSURE: 25.66
RETIRED EF AND QEF - SEE NOTES: 55 (ref 55–65)
TRICUSPID VALVE REGURGITATION: NORMAL

## 2018-04-16 DIAGNOSIS — M79.642 LEFT HAND PAIN: Primary | ICD-10-CM

## 2018-04-18 ENCOUNTER — HOSPITAL ENCOUNTER (OUTPATIENT)
Dept: RADIOLOGY | Facility: HOSPITAL | Age: 73
Discharge: HOME | End: 2018-04-18
Attending: ORTHOPAEDIC SURGERY
Payer: MEDICARE

## 2018-04-18 ENCOUNTER — OFFICE VISIT (OUTPATIENT)
Dept: ORTHOPEDICS | Facility: CLINIC | Age: 73
End: 2018-04-18
Payer: MEDICARE

## 2018-04-18 VITALS — HEIGHT: 66 IN | BODY MASS INDEX: 27.16 KG/M2 | WEIGHT: 169 LBS

## 2018-04-18 DIAGNOSIS — M79.642 LEFT HAND PAIN: ICD-10-CM

## 2018-04-18 DIAGNOSIS — M65.342 TRIGGER RING FINGER OF LEFT HAND: Primary | ICD-10-CM

## 2018-04-18 PROCEDURE — 20550 NJX 1 TENDON SHEATH/LIGAMENT: CPT | Mod: F3,S$GLB,, | Performed by: ORTHOPAEDIC SURGERY

## 2018-04-18 PROCEDURE — 73110 X-RAY EXAM OF WRIST: CPT | Mod: 26,LT,, | Performed by: RADIOLOGY

## 2018-04-18 PROCEDURE — 73130 X-RAY EXAM OF HAND: CPT | Mod: 26,LT,, | Performed by: RADIOLOGY

## 2018-04-18 PROCEDURE — 73130 X-RAY EXAM OF HAND: CPT | Mod: TC,PO,LT

## 2018-04-18 PROCEDURE — 99204 OFFICE O/P NEW MOD 45 MIN: CPT | Mod: 25,S$GLB,, | Performed by: ORTHOPAEDIC SURGERY

## 2018-04-18 PROCEDURE — 99999 PR PBB SHADOW E&M-EST. PATIENT-LVL II: CPT | Mod: PBBFAC,,, | Performed by: ORTHOPAEDIC SURGERY

## 2018-04-18 PROCEDURE — 73110 X-RAY EXAM OF WRIST: CPT | Mod: TC,PO,LT

## 2018-04-18 RX ORDER — TRIAMCINOLONE ACETONIDE 40 MG/ML
40 INJECTION, SUSPENSION INTRA-ARTICULAR; INTRAMUSCULAR
Status: DISCONTINUED | OUTPATIENT
Start: 2018-04-18 | End: 2018-04-18 | Stop reason: HOSPADM

## 2018-04-18 RX ADMIN — TRIAMCINOLONE ACETONIDE 40 MG: 40 INJECTION, SUSPENSION INTRA-ARTICULAR; INTRAMUSCULAR at 12:04

## 2018-04-18 NOTE — PROCEDURES
Small Joint Aspiration/Injection  Date/Time: 4/18/2018 12:22 PM  Performed by: HARRITE JUAREZ  Authorized by: HARRIET JUAREZ     Consent Done?:  Yes (Verbal)  Indications:  Pain    Location:  Ring finger  Site:  L ring MCP  Ultrasonic Guidance for needle placement: No  Needle size:  25 G  Medications:  40 mg triamcinolone acetonide 40 mg/mL

## 2018-04-18 NOTE — PROGRESS NOTES
Halima Anderson, 72 years old, locking, catching, and triggering of his left ring   finger has now for about a month's time, had a similar problem years ago,   responded well to injection.    Exam today shows he does indeed have elicitable trigger, left fourth finger.  No   signs of infection or instability.    X-rays are negative.    ASSESSMENT:  Left fourth trigger finger.    PLAN:  Kenalog injection.  We discussed with him the possibility of surgical   release.  We will see him back as needed.      PBB/HN  dd: 04/18/2018 08:56:29 (CDT)  td: 04/18/2018 09:10:37 (CDT)  Doc ID   #[id number]  Job ID #[id number]    CC:   Further History      Plan  Further History  Aching pain  Worse with activity  Relieved with rest  No other associated symptoms  No other radiation    Further Exam  Alert and oriented  Pleasant  Contralateral limb has appropriate range of motion for age and condition  Contralateral limb has appropriate strength for age and condition  Contralateral limb has appropriate stability  for age and condition  No adenopathy  Pulses are appropriate for current condition  Skin is intact        Chief Complaint    Chief Complaint   Patient presents with    Right Hand - Pain       HPI  Castillo Anderson Jr. is a 72 y.o.  male who presents with       Past Medical History  Past Medical History:   Diagnosis Date    Cataract     OU    Claudication of lower extremity     Hx of mumps     Hyperlipidemia     Left carotid stenosis     Lumbar disc disorder     PVD (peripheral vascular disease)     Sleep apnea     uses cpap    Tobacco dependence        Past Surgical History  Past Surgical History:   Procedure Laterality Date    CAROTID ENDARTERECTOMY Left 2015    after 6/2015    CAROTID ENDARTERECTOMY Left 06/2016    CATARACT EXTRACTION Bilateral     COMPUTED TOMOGRAPHY ANGIOGRAM Left 05/26/2016    Leg    EPIDURAL BLOCK INJECTION      FEMORAL ARTERY STENT  2010    FEMORAL ARTERY STENT Left 05/26/2016    LAMINECTOMY   2012    left sup fem and prox popliteal stents  9/30/15    x 2    TONSILLECTOMY      UVULECTOMY         Medications  Current Outpatient Prescriptions   Medication Sig    BREO ELLIPTA 200-25 mcg/dose DsDv diskus inhaler INHALE 1 PUFF BY MOUTH EVERY DAY    calcipotriene-betamethasone (TACLONEX) ointment     clopidogrel (PLAVIX) 75 mg tablet Take 75 mg by mouth once daily.    duloxetine (CYMBALTA) 60 MG capsule Take 60 mg by mouth once daily.    Instructed to take morning of surgery.    ezetimibe (ZETIA) 10 mg tablet TAKE 1 TABLET EVERY DAY    rosuvastatin (CRESTOR) 20 MG tablet TAKE 1 TABLET BY MOUTH EVERY DAY    SPIRIVA WITH HANDIHALER 18 mcg inhalation capsule INHALE 1 CAPSULE VIA HANDIHALER ONCE DAILY AT THE SAME TIME EVERY DAY    trazodone (DESYREL) 50 MG tablet Take 50 mg by mouth every evening. Every day     No current facility-administered medications for this visit.        Allergies  Review of patient's allergies indicates:   Allergen Reactions    No known drug allergies        Family History  Family History   Problem Relation Age of Onset    Cataracts Mother     Alzheimer's disease Mother     Cancer Father      prostate    Cataracts Sister     Amblyopia Neg Hx     Blindness Neg Hx     Diabetes Neg Hx     Glaucoma Neg Hx     Hypertension Neg Hx     Macular degeneration Neg Hx     Retinal detachment Neg Hx     Strabismus Neg Hx     Stroke Neg Hx     Thyroid disease Neg Hx        Social History  Social History     Social History    Marital status:      Spouse name: N/A    Number of children: N/A    Years of education: N/A     Occupational History    Not on file.     Social History Main Topics    Smoking status: Current Every Day Smoker     Packs/day: 1.00     Years: 55.00     Types: Cigarettes    Smokeless tobacco: Never Used      Comment: vapeing    Alcohol use 1.2 oz/week     2 Cans of beer per week      Comment: per week    Drug use: No    Sexual activity: Not on file      Other Topics Concern    Not on file     Social History Narrative    No narrative on file               Review of Systems     Constitutional: Negative    HENT: Negative  Eyes: Negative  Respiratory: Negative  Cardiovascular: Negative  Musculoskeletal: HPI  Skin: Negative  Neurological: Negative  Hematological: Negative  Endocrine: Negative                 Physical Exam    There were no vitals filed for this visit.  Body mass index is 27.28 kg/m².  Physical Examination:     General appearance -  well appearing, and in no distress  Mental status - awake  Neck - supple  Chest -  symmetric air entry  Heart - normal rate   Abdomen - soft      Assessment     1. Trigger ring finger of left hand          Plan

## 2018-04-19 ENCOUNTER — TELEPHONE (OUTPATIENT)
Dept: GASTROENTEROLOGY | Facility: CLINIC | Age: 73
End: 2018-04-19

## 2018-04-19 NOTE — TELEPHONE ENCOUNTER
Hi,       Pt has been scheduled for colon on 5/15 with Dr. Morton. Pt will need to stop plavix 5 days prior to procedure. Please advise.    Thank you,  Naina

## 2018-05-07 ENCOUNTER — TELEPHONE (OUTPATIENT)
Dept: GASTROENTEROLOGY | Facility: CLINIC | Age: 73
End: 2018-05-07

## 2018-05-07 ENCOUNTER — HOSPITAL ENCOUNTER (OUTPATIENT)
Dept: RADIOLOGY | Facility: HOSPITAL | Age: 73
Discharge: HOME | End: 2018-05-07
Attending: NURSE PRACTITIONER
Payer: MEDICARE

## 2018-05-07 ENCOUNTER — OFFICE VISIT (OUTPATIENT)
Dept: GASTROENTEROLOGY | Facility: CLINIC | Age: 73
End: 2018-05-07
Payer: MEDICARE

## 2018-05-07 VITALS
DIASTOLIC BLOOD PRESSURE: 85 MMHG | HEART RATE: 114 BPM | BODY MASS INDEX: 25.83 KG/M2 | WEIGHT: 160.69 LBS | HEIGHT: 66 IN | SYSTOLIC BLOOD PRESSURE: 132 MMHG

## 2018-05-07 DIAGNOSIS — Z86.010 HISTORY OF COLON POLYPS: ICD-10-CM

## 2018-05-07 DIAGNOSIS — R19.7 DIARRHEA, UNSPECIFIED TYPE: ICD-10-CM

## 2018-05-07 DIAGNOSIS — R19.7 DIARRHEA, UNSPECIFIED TYPE: Primary | ICD-10-CM

## 2018-05-07 PROCEDURE — 74019 RADEX ABDOMEN 2 VIEWS: CPT | Mod: TC,FY,PO

## 2018-05-07 PROCEDURE — 99999 PR PBB SHADOW E&M-EST. PATIENT-LVL IV: CPT | Mod: PBBFAC,,, | Performed by: NURSE PRACTITIONER

## 2018-05-07 PROCEDURE — 74019 RADEX ABDOMEN 2 VIEWS: CPT | Mod: 26,,, | Performed by: RADIOLOGY

## 2018-05-07 PROCEDURE — 99214 OFFICE O/P EST MOD 30 MIN: CPT | Mod: S$GLB,,, | Performed by: NURSE PRACTITIONER

## 2018-05-07 RX ORDER — NAPROXEN SODIUM 220 MG/1
81 TABLET, FILM COATED ORAL
COMMUNITY
End: 2022-01-13

## 2018-05-07 RX ORDER — DICYCLOMINE HYDROCHLORIDE 10 MG/1
10 CAPSULE ORAL
Qty: 120 CAPSULE | Refills: 0 | Status: SHIPPED | OUTPATIENT
Start: 2018-05-07 | End: 2019-04-03

## 2018-05-07 NOTE — TELEPHONE ENCOUNTER
----- Message from Gladis Jane sent at 5/7/2018  8:19 AM CDT -----  Contact: self   Type:  Same Day Appointment Request    Caller is requesting a same day appointment.  Caller declined first available appointment listed below.      Name of Caller:  Patient   Symptoms: stomach issues   Best Call Back Number: [phone] (home)

## 2018-05-07 NOTE — LETTER
May 10, 2018      Frantz Lemos MD  [address] [facility] [address] [place] [address]           [facility]  [address] [facility] [address] [place] [address]  Phone: [phone]          Patient: Castillo Anderson Jr.   MR Number: [id number]   YOB: 1945   Date of Visit: 5/7/2018       Dear Dr. Frantz Lemos:    Thank you for referring Castillo Anderson to me for evaluation. Attached you will find relevant portions of my assessment and plan of care.    If you have questions, please do not hesitate to call me. I look forward to following Castillo Anderson along with you.    Sincerely,        Enclosure  CC:  No Recipients    If you would like to receive this communication electronically, please contact [email or web address] or [phone] to request more information on [employer] access.    For providers and/or their staff who would like to refer a patient to [facility], please contact us through our one-stop-shop provider referral line, Nilda Brink, at [phone].    If you feel you have received this communication in error or would no longer like to receive these types of communications, please e-mail [email or web address]

## 2018-05-07 NOTE — PATIENT INSTRUCTIONS

## 2018-05-07 NOTE — PROGRESS NOTES
Subjective:       Patient ID: Castillo Anderson Jr. is a 72 y.o. male Body mass index is 25.94 kg/m².    Chief Complaint: Diarrhea (just got off cruise ship)    This patient is new to me.  Established patient of Dr. Morton.    Diarrhea    The current episode started in the past 7 days (started around 5/1/18 while on the cruise). Episode frequency: 4-5 times a day; when first started it was > 10 times a day. The problem has been gradually improving. Diarrhea characteristics: dark/black pasty stool; lighter brown now and watery. The patient states that diarrhea awakens him from sleep. Associated symptoms include abdominal pain (periumbilical and suprapubic described as cramping; improved after bowel movement), increased flatus and weight loss (lost 10 lbs over the past week due to decreased food intake). Pertinent negatives include no chills, coughing, fever or vomiting. Nothing aggravates the symptoms. Risk factors include travel to endemic area (foreign travel to [facility], and [facility] with cruise which was 4/28/18- 5/6/18; denies recent antibiotic/hospitalization). Treatments tried: pepto prn, imodium prn, ashleigh-seltzer prn. The treatment provided no relief. There is no history of inflammatory bowel disease or irritable bowel syndrome.     Review of Systems   Constitutional: Positive for appetite change (decreased; eating maybe one meal) and weight loss (lost 10 lbs over the past week due to decreased food intake). Negative for chills, fatigue, fever and unexpected weight change.   HENT: Negative for sore throat and trouble swallowing.    Respiratory: Negative for cough, choking and shortness of breath.    Cardiovascular: Negative for chest pain.   Gastrointestinal: Positive for abdominal pain (periumbilical and suprapubic described as cramping; improved after bowel movement), anal bleeding (scant amount of bright red blood on toilet paper which he relates to hemorrhoids; denies bleeding in  between bowel movements), diarrhea and flatus. Negative for blood in stool, constipation, nausea, rectal pain and vomiting.   Genitourinary: Negative for difficulty urinating, dysuria and flank pain.   Neurological: Negative for weakness.       Past Medical History:   Diagnosis Date    Cataract     OU    Claudication of lower extremity     Colon polyp     Hx of mumps     Hyperlipidemia     Kidney stone     Left carotid stenosis     Lumbar disc disorder     PVD (peripheral vascular disease)     Sleep apnea     uses cpap    Tobacco dependence      Past Surgical History:   Procedure Laterality Date    CAROTID ENDARTERECTOMY Left 2015    after 6/2015    CAROTID ENDARTERECTOMY Left 06/2016    CATARACT EXTRACTION Bilateral     COLONOSCOPY  03/05/2013    Dr. Morton, repeat in 5 years for surveillance    COLONOSCOPY  2007    in legacy    COMPUTED TOMOGRAPHY ANGIOGRAM Left 05/26/2016    Leg    EPIDURAL BLOCK INJECTION      FEMORAL ARTERY STENT  2010    FEMORAL ARTERY STENT Left 05/26/2016    LAMINECTOMY  2012    left sup fem and prox popliteal stents  9/30/15    x 2    TONSILLECTOMY      UVULECTOMY       Family History   Problem Relation Age of Onset    Cataracts Mother     Alzheimer's disease Mother     Cancer Father         prostate    Cataracts Sister     Amblyopia Neg Hx     Blindness Neg Hx     Diabetes Neg Hx     Glaucoma Neg Hx     Hypertension Neg Hx     Macular degeneration Neg Hx     Retinal detachment Neg Hx     Strabismus Neg Hx     Stroke Neg Hx     Thyroid disease Neg Hx     Colon cancer Neg Hx     Colon polyps Neg Hx     Crohn's disease Neg Hx     Celiac disease Neg Hx     Ulcerative colitis Neg Hx      Wt Readings from Last 10 Encounters:   05/07/18 72.9 kg (160 lb 11.5 oz)   04/18/18 76.7 kg (169 lb)   04/04/18 76.7 kg (169 lb 1.5 oz)   04/04/18 76.6 kg (168 lb 14 oz)   03/27/18 75.6 kg (166 lb 10.7 oz)   02/21/18 77.7 kg (171 lb 4.8 oz)   01/25/18 76.9 kg (169 lb  8.5 oz)   12/21/17 77.8 kg (171 lb 8.3 oz)   04/25/17 74.4 kg (164 lb 0.4 oz)   01/16/17 76.2 kg (167 lb 15.9 oz)     Lab Results   Component Value Date    WBC 7.04 03/20/2018    HGB 16.5 03/20/2018    HCT 50.8 03/20/2018    MCV 86 03/20/2018     03/20/2018     CMP  Sodium   Date Value Ref Range Status   03/20/2018 142 136 - 145 mmol/L Final     Potassium   Date Value Ref Range Status   03/20/2018 4.7 3.5 - 5.1 mmol/L Final     Chloride   Date Value Ref Range Status   03/20/2018 106 95 - 110 mmol/L Final     CO2   Date Value Ref Range Status   03/20/2018 28 23 - 29 mmol/L Final     Glucose   Date Value Ref Range Status   03/20/2018 86 70 - 110 mg/dL Final     BUN, Bld   Date Value Ref Range Status   03/20/2018 13 8 - 23 mg/dL Final     Creatinine   Date Value Ref Range Status   03/20/2018 1.0 0.5 - 1.4 mg/dL Final     Calcium   Date Value Ref Range Status   03/20/2018 9.7 8.7 - 10.5 mg/dL Final     Total Protein   Date Value Ref Range Status   03/20/2018 7.3 6.0 - 8.4 g/dL Final     Albumin   Date Value Ref Range Status   03/20/2018 4.1 3.5 - 5.2 g/dL Final     Total Bilirubin   Date Value Ref Range Status   03/20/2018 0.4 0.1 - 1.0 mg/dL Final     Comment:     For infants and newborns, interpretation of results should be based  on gestational age, weight and in agreement with clinical  observations.  Premature Infant recommended reference ranges:  Up to 24 hours.............<8.0 mg/dL  Up to 48 hours............<12.0 mg/dL  3-5 days..................<15.0 mg/dL  6-29 days.................<15.0 mg/dL       Alkaline Phosphatase   Date Value Ref Range Status   03/20/2018 61 55 - 135 U/L Final     AST   Date Value Ref Range Status   03/20/2018 18 10 - 40 U/L Final     ALT   Date Value Ref Range Status   03/20/2018 20 10 - 44 U/L Final     Anion Gap   Date Value Ref Range Status   03/20/2018 8 8 - 16 mmol/L Final     eGFR if    Date Value Ref Range Status   03/20/2018 >60.0 >60 mL/min/1.73 m^2  "Final     eGFR if non    Date Value Ref Range Status   03/20/2018 >60.0 >60 mL/min/1.73 m^2 Final     Comment:     Calculation used to obtain the estimated glomerular filtration  rate (eGFR) is the CKD-EPI equation.        Lab Results   Component Value Date    TSH 1.216 03/20/2018     Reviewed prior medical records including radiology report of 10/1/16 ct renal stone abdomen pelvis & endoscopy history (see surgical history).    3/5/13 Colonoscopy was reviewed and procedure report states:   " Findings:       Internal hemorrhoids were found during retroflexion. The exam was        otherwise without abnormality to cecum.  Impression:          - Internal hemorrhoids.                       - The examination was otherwise normal.  Recommendation:      - Repeat colonoscopy in 5 years for surveillance, or                        PRN.                       - Discharge patient to home (ambulatory). ".    Objective:      Physical Exam   Constitutional: He is oriented to person, place, and time. He appears well-developed and well-nourished. No distress.   HENT:   Mouth/Throat: Oropharynx is clear and moist and mucous membranes are normal. No oral lesions. No oropharyngeal exudate.   Eyes: Conjunctivae are normal. Pupils are equal, round, and reactive to light. No scleral icterus.   Cardiovascular: Normal rate.    Pulmonary/Chest: Effort normal and breath sounds normal. No respiratory distress. He has no wheezes.   Abdominal: Soft. Normal appearance. He exhibits no distension, no abdominal bruit and no mass. Bowel sounds are increased. There is no hepatosplenomegaly. There is no tenderness. There is no rigidity, no rebound, no guarding, no tenderness at McBurney's point and negative Lenz's sign. No hernia.   Neurological: He is alert and oriented to person, place, and time.   Skin: Skin is warm and dry. No rash noted. He is not diaphoretic. No erythema. No pallor.   Non-jaundiced   Psychiatric: He has a normal " mood and affect. His behavior is normal. Judgment and thought content normal.   Nursing note and vitals reviewed.      Assessment:       1. Diarrhea, unspecified type    2. History of colon polyps        Plan:       Diarrhea, unspecified type  -     Stool Exam-Ova,Cysts,Parasites; Future; Expected date: 05/07/2018  -     Adenovirus Antigen EIA, Stool; Future; Expected date: 05/07/2018  -     Giardia / Cryptosporidum, EIA; Future; Expected date: 05/07/2018  -     Occult blood x 1, stool; Future; Expected date: 05/07/2018  -     Rotavirus antigen, stool; Future; Expected date: 05/07/2018  -     WBC, Stool; Future; Expected date: 05/07/2018  -     Stool culture; Future; Expected date: 05/07/2018  -     Clostridium difficile EIA; Future; Expected date: 05/07/2018  -     X-Ray Abdomen Flat And Erect; Future; Expected date: 05/07/2018  - START    dicyclomine (BENTYL) 10 MG capsule; Take 1 capsule (10 mg total) by mouth before meals and at bedtime as needed (abdominal cramping/pain).  Dispense: 120 capsule; Refill: 0  - CONTINUE WITH COLONOSCOPY AS SCHEDULED FOR 5/15/18; if positive for c. Diff, will need to reschedule colonoscopy; otherwise can continue with colonoscopy as scheduled but would also be for diagnostic pruposes  - recommended OTC probiotic, such as Align or Culturelle, as directed  - avoid lactose    History of colon polyps  - CONTINUE WITH COLONOSCOPY AS SCHEDULED FOR 5/15/18    Follow-up in about 1 month (around 6/7/2018), or if symptoms worsen or fail to improve.      If no improvement in symptoms or symptoms worsen, call/follow-up at clinic or go to ER.

## 2018-05-07 NOTE — TELEPHONE ENCOUNTER
S/w pt he went on a cruise a couple weeks ago and since then he has had diarrhea. Nuzhat told me the NP had an appt so they could see pt today. Pt will be coming for 10:30 AM.

## 2018-05-08 ENCOUNTER — TELEPHONE (OUTPATIENT)
Dept: GASTROENTEROLOGY | Facility: CLINIC | Age: 73
End: 2018-05-08

## 2018-05-08 NOTE — TELEPHONE ENCOUNTER
Pt is following up on stool results. Please advise. He is aware that some studies are still in process.

## 2018-05-08 NOTE — TELEPHONE ENCOUNTER
"----- Message from Darlene Randhawa sent at 5/8/2018 11:36 AM CDT -----  Contact: Patient  Castillo, "MICKEY" [phone] calling to speak with nurse Naina regarding the visit he has yesterday with Angie Dick. Please advise. Thanks.  "

## 2018-05-09 ENCOUNTER — TELEPHONE (OUTPATIENT)
Dept: GASTROENTEROLOGY | Facility: CLINIC | Age: 73
End: 2018-05-09

## 2018-05-09 NOTE — TELEPHONE ENCOUNTER
Please call to inform & review the results with the patient- radiology report of the abdominal x-ray showed unremarkable bowel gas pattern, no signs of intestinal obstruction.  Other incidental findings showed some calcifications to the area of the kidneys, which can be possible kidney stones (prior history of kidney stones); bones showed osteopenia and degenerative changes: Recommend follow-up with Primary Care Provider for continued evaluation and management of these findings. Otherwise unremarkable findings.    Some stool studies are still pending (we will notify you once received and reviewed), but the ones that have come back show positive for blood and white blood cells (sign of inflammation) I recommend continuing with diagnostic colonoscopy to further evaluate these findings. Otherwise negative findings so far on stool studies.  How is the diarrhea?    Continue with previous recommendations. If no improvement in symptoms or symptoms worsen, call/follow-up at clinic or go to ER.  Please release results to patient's mychart once you have discussed results and recommendations with patient.  Thanks,  Angie ABBOTT

## 2018-05-09 NOTE — TELEPHONE ENCOUNTER
----- Message from Maria Teresa Myers sent at 5/9/2018 10:15 AM CDT -----  Contact: self  Patient requesting call back regarding test results.  Please call [phone]

## 2018-05-09 NOTE — TELEPHONE ENCOUNTER
----- Message from Mari Reyes sent at 5/9/2018 10:20 AM CDT -----  Contact: Self  Patient states that he got his results but would like a return call to see what is the next step.  Call back at [phone] (home),, Thanks

## 2018-05-09 NOTE — TELEPHONE ENCOUNTER
Spoke to pt art length regarding test rslts, instr to stay well hydrated, do not use Immodium or anything to stop the BM. Instr can try Boost, Ensure and eat small amounts 6x daily for nutrition. Once all specimens are resulted I will call back but for now no antibiotics. Pt verbs understanding

## 2018-05-15 ENCOUNTER — SURGERY (OUTPATIENT)
Age: 73
End: 2018-05-15

## 2018-05-15 ENCOUNTER — ANESTHESIA EVENT (OUTPATIENT)
Dept: ENDOSCOPY | Facility: HOSPITAL | Age: 73
End: 2018-05-15
Payer: MEDICARE

## 2018-05-15 ENCOUNTER — ANESTHESIA (OUTPATIENT)
Dept: ENDOSCOPY | Facility: HOSPITAL | Age: 73
End: 2018-05-15
Payer: MEDICARE

## 2018-05-15 ENCOUNTER — HOSPITAL ENCOUNTER (OUTPATIENT)
Facility: HOSPITAL | Age: 73
Discharge: HOME | End: 2018-05-15
Attending: INTERNAL MEDICINE | Admitting: INTERNAL MEDICINE
Payer: MEDICARE

## 2018-05-15 VITALS
HEART RATE: 78 BPM | OXYGEN SATURATION: 98 % | SYSTOLIC BLOOD PRESSURE: 112 MMHG | WEIGHT: 160 LBS | TEMPERATURE: 97 F | BODY MASS INDEX: 25.71 KG/M2 | DIASTOLIC BLOOD PRESSURE: 78 MMHG | RESPIRATION RATE: 16 BRPM | HEIGHT: 66 IN

## 2018-05-15 DIAGNOSIS — Z86.010 HX OF COLONIC POLYPS: ICD-10-CM

## 2018-05-15 PROBLEM — Z86.0100 HX OF COLONIC POLYPS: Status: ACTIVE | Noted: 2018-05-15

## 2018-05-15 PROCEDURE — 37000009 HC ANESTHESIA EA ADD 15 MINS: Mod: PO | Performed by: INTERNAL MEDICINE

## 2018-05-15 PROCEDURE — 45380 COLONOSCOPY AND BIOPSY: CPT | Mod: PO | Performed by: INTERNAL MEDICINE

## 2018-05-15 PROCEDURE — 88305 TISSUE EXAM BY PATHOLOGIST: CPT | Mod: 26,,, | Performed by: PATHOLOGY

## 2018-05-15 PROCEDURE — 37000008 HC ANESTHESIA 1ST 15 MINUTES: Mod: PO | Performed by: INTERNAL MEDICINE

## 2018-05-15 PROCEDURE — D9220A PRA ANESTHESIA: Mod: PT,ANES,, | Performed by: ANESTHESIOLOGY

## 2018-05-15 PROCEDURE — 45380 COLONOSCOPY AND BIOPSY: CPT | Mod: PT,,, | Performed by: INTERNAL MEDICINE

## 2018-05-15 PROCEDURE — 88305 TISSUE EXAM BY PATHOLOGIST: CPT | Performed by: PATHOLOGY

## 2018-05-15 PROCEDURE — 63600175 PHARM REV CODE 636 W HCPCS: Mod: PO | Performed by: NURSE ANESTHETIST, CERTIFIED REGISTERED

## 2018-05-15 PROCEDURE — 27201012 HC FORCEPS, HOT/COLD, DISP: Mod: PO | Performed by: INTERNAL MEDICINE

## 2018-05-15 PROCEDURE — D9220A PRA ANESTHESIA: Mod: PT,CRNA,, | Performed by: NURSE ANESTHETIST, CERTIFIED REGISTERED

## 2018-05-15 RX ORDER — SODIUM CHLORIDE, SODIUM LACTATE, POTASSIUM CHLORIDE, CALCIUM CHLORIDE 600; 310; 30; 20 MG/100ML; MG/100ML; MG/100ML; MG/100ML
INJECTION, SOLUTION INTRAVENOUS CONTINUOUS
Status: DISCONTINUED | OUTPATIENT
Start: 2018-05-15 | End: 2018-05-15 | Stop reason: HOSPADM

## 2018-05-15 RX ORDER — PROPOFOL 10 MG/ML
VIAL (ML) INTRAVENOUS
Status: DISCONTINUED | OUTPATIENT
Start: 2018-05-15 | End: 2018-05-15

## 2018-05-15 RX ORDER — LIDOCAINE HCL/PF 100 MG/5ML
SYRINGE (ML) INTRAVENOUS
Status: DISCONTINUED | OUTPATIENT
Start: 2018-05-15 | End: 2018-05-15

## 2018-05-15 RX ORDER — MESALAMINE 0.38 G/1
1.5 CAPSULE, EXTENDED RELEASE ORAL DAILY
Qty: 120 CAPSULE | Refills: 2 | Status: SHIPPED | OUTPATIENT
Start: 2018-05-15 | End: 2019-09-09 | Stop reason: HOSPADM

## 2018-05-15 RX ADMIN — PROPOFOL 30 MG: 10 INJECTION, EMULSION INTRAVENOUS at 07:05

## 2018-05-15 RX ADMIN — SODIUM CHLORIDE, SODIUM LACTATE, POTASSIUM CHLORIDE, CALCIUM CHLORIDE: 600; 310; 30; 20 INJECTION, SOLUTION INTRAVENOUS at 07:05

## 2018-05-15 RX ADMIN — PROPOFOL 30 MG: 10 INJECTION, EMULSION INTRAVENOUS at 08:05

## 2018-05-15 RX ADMIN — LIDOCAINE HYDROCHLORIDE 100 MG: 20 INJECTION, SOLUTION INTRAVENOUS at 07:05

## 2018-05-15 NOTE — ANESTHESIA POSTPROCEDURE EVALUATION
"Anesthesia Post Evaluation    Patient: Castillo Anderson Jr.    Procedure(s) Performed: Procedure(s) (LRB):  COLONOSCOPY (N/A)    Final Anesthesia Type: general  Patient location during evaluation: PACU  Patient participation: Yes- Able to Participate  Level of consciousness: awake and alert  Post-procedure vital signs: reviewed and stable  Pain management: adequate  Airway patency: patent  PONV status at discharge: No PONV  Anesthetic complications: no      Cardiovascular status: hemodynamically stable and blood pressure returned to baseline  Respiratory status: unassisted, spontaneous ventilation and room air  Hydration status: euvolemic  Follow-up not needed.        Visit Vitals  /78 (BP Location: Left arm, Patient Position: Lying)   Pulse 78   Temp 36.3 °C (97.4 °F) (Skin)   Resp 16   Ht 5' 6" (1.676 m)   Wt 72.6 kg (160 lb)   SpO2 98%   BMI 25.82 kg/m²       Pain/Gloria Score: Pain Assessment Performed: Yes (5/15/2018  8:47 AM)  Presence of Pain: denies (5/15/2018  8:47 AM)  Gloria Score: 10 (5/15/2018  8:47 AM)      "

## 2018-05-15 NOTE — PROVATION PATIENT INSTRUCTIONS
Discharge Summary/Instructions after an Endoscopic Procedure  Patient Name: Castillo Anderson  Patient MRN: [id number]  Patient YOB: 1945  Tuesday, May 15, 2018  Quentin Morton MD  RESTRICTIONS:  During your procedure today, you received medications for sedation.  These   medications may affect your judgment, balance and coordination.  Therefore,   for 24 hours, you have the following restrictions:   - DO NOT drive a car, operate machinery, make legal/financial decisions,   sign important papers or drink alcohol.    ACTIVITY:  The following day: return to full activity including work, except no heavy   lifting, straining or running for 3 days if polyps were removed.  DIET:  Eat and drink normally unless instructed otherwise.     TREATMENT FOR COMMON SIDE EFFECTS:  - Mild abdominal pain, nausea, belching, bloating or excessive gas:  rest,   eat lightly and use a heating pad.  - Sore Throat: treat with throat lozenges and/or gargle with warm salt   water.  - Because air was used during the procedure, expelling large amounts of air   from your rectum or belching is normal.  - If a bowel prep was taken, you may not have a bowel movement for 1-3 days.    This is normal.  SYMPTOMS TO WATCH FOR AND REPORT TO YOUR PHYSICIAN:  1. Abdominal pain or bloating, other than gas cramps.  2. Chest pain.  3. Back pain.  4. Signs of infection such as: chills or fever occurring within 24 hours   after the procedure.  5. Rectal bleeding, which would show as bright red, maroon, or black stools.   (A tablespoon of blood from the rectum is not serious, especially if   hemorrhoids are present.)  6. Vomiting.  7. Weakness or dizziness.  GO DIRECTLY TO THE NEAREST EMERGENCY ROOM IF YOU HAVE ANY OF THE FOLLOWING:      Difficulty breathing              Chills and/or fever over 101 F   Persistent vomiting and/or vomiting blood   Severe abdominal pain   Severe chest pain   Black, tarry stools   Bleeding- more than one  tablespoon   Any other symptom or condition that you feel may need urgent attention  Your doctor recommends these additional instructions:  If any biopsies were taken, your doctors clinic will contact you in 1 to 2   weeks with any results.  - Await pathology results.   - Repeat colonoscopy in 5 years for surveillance.   - Discharge patient to home (ambulatory).  For questions, problems or results please call your physician - Quentin Morton MD at Work: [phone].  EMERGENCY PHONE NUMBER: [phone], LAB RESULTS: [phone]  IF A COMPLICATION OR EMERGENCY SITUATION ARISES AND YOU ARE UNABLE TO REACH   YOUR PHYSICIAN - GO DIRECTLY TO THE EMERGENCY ROOM.  ___________________________________________  Nurse Signature  ___________________________________________  Patient/Designated Responsible Party Signature  Quentin Morton MD  5/15/2018 8:16:56 AM  This report has been verified and signed electronically.

## 2018-05-15 NOTE — H&P
History & Physical - Short Stay  Gastroenterology      SUBJECTIVE:     Procedure: Colonoscopy    Chief Complaint/Indication for Procedure: Previous Polyps    PTA Medications   Medication Sig    aspirin 81 MG Chew 81 mg.    BREO ELLIPTA 200-25 mcg/dose DsDv diskus inhaler INHALE 1 PUFF BY MOUTH EVERY DAY    calcipotriene-betamethasone (TACLONEX) ointment     clopidogrel (PLAVIX) 75 mg tablet Take 75 mg by mouth once daily.    dicyclomine (BENTYL) 10 MG capsule Take 1 capsule (10 mg total) by mouth before meals and at bedtime as needed (abdominal cramping/pain).    duloxetine (CYMBALTA) 60 MG capsule Take 60 mg by mouth once daily.    Instructed to take morning of surgery.    ezetimibe (ZETIA) 10 mg tablet TAKE 1 TABLET EVERY DAY    rosuvastatin (CRESTOR) 20 MG tablet TAKE 1 TABLET BY MOUTH EVERY DAY    SPIRIVA WITH HANDIHALER 18 mcg inhalation capsule INHALE 1 CAPSULE VIA HANDIHALER ONCE DAILY AT THE SAME TIME EVERY DAY    trazodone (DESYREL) 50 MG tablet Take 50 mg by mouth every evening. Every day       Review of patient's allergies indicates:   Allergen Reactions    No known drug allergies         Past Medical History:   Diagnosis Date    Cataract     OU    Claudication of lower extremity     Colon polyp     COPD (chronic obstructive pulmonary disease)     Hx of mumps     Hyperlipidemia     Kidney stone     Left carotid stenosis     procedure done to fix    Lumbar disc disorder     PVD (peripheral vascular disease)     Sleep apnea     uses cpap    Tobacco dependence      Past Surgical History:   Procedure Laterality Date    CAROTID ENDARTERECTOMY Left 2015    after 6/2015    CAROTID ENDARTERECTOMY Left 06/2016    CATARACT EXTRACTION Bilateral     COLONOSCOPY  03/05/2013    Dr. Morton, repeat in 5 years for surveillance    COLONOSCOPY  2007    in legacy    COMPUTED TOMOGRAPHY ANGIOGRAM Left 05/26/2016    Leg    EPIDURAL BLOCK INJECTION      EYE SURGERY      FEMORAL ARTERY STENT   2010    FEMORAL ARTERY STENT Left 05/26/2016    LAMINECTOMY  2012    left sup fem and prox popliteal stents  9/30/15    x 2    TONSILLECTOMY      UVULECTOMY       Family History   Problem Relation Age of Onset    Cataracts Mother     Alzheimer's disease Mother     Cancer Father         prostate    Cataracts Sister     Amblyopia Neg Hx     Blindness Neg Hx     Diabetes Neg Hx     Glaucoma Neg Hx     Hypertension Neg Hx     Macular degeneration Neg Hx     Retinal detachment Neg Hx     Strabismus Neg Hx     Stroke Neg Hx     Thyroid disease Neg Hx     Colon cancer Neg Hx     Colon polyps Neg Hx     Crohn's disease Neg Hx     Celiac disease Neg Hx     Ulcerative colitis Neg Hx      Social History   Substance Use Topics    Smoking status: Current Every Day Smoker     Packs/day: 1.00     Years: 55.00     Types: Cigarettes    Smokeless tobacco: Never Used      Comment: vapeing    Alcohol use 1.2 oz/week     2 Cans of beer per week      Comment: per week         OBJECTIVE:     Vital Signs (Most Recent)  Temp: 97.7 °F (36.5 °C) (05/15/18 0711)  Pulse: 87 (05/15/18 0711)  Resp: 16 (05/15/18 0711)  BP: 129/61 (05/15/18 0711)  SpO2: 99 % (05/15/18 0711)    Physical Exam:                                                       GENERAL:  Comfortable, in no acute distress.                                 HEENT EXAM:  Nonicteric.  No adenopathy.  Oropharynx is clear.               NECK:  Supple.                                                               LUNGS:  Clear.                                                               CARDIAC:  Regular rate and rhythm.  S1, S2.  No murmur.                      ABDOMEN:  Soft, positive bowel sounds, nontender.  No hepatosplenomegaly or masses.  No rebound or guarding.                                             EXTREMITIES:  No edema.     MENTAL STATUS:  Normal, alert and oriented.      ASSESSMENT/PLAN:     Assessment: Previous Polyps    Plan:  Colonoscopy    Anesthesia Plan: General    ASA Grade: ASA 2 - Patient with mild systemic disease with no functional limitations    MALLAMPATI SCORE:  I (soft palate, uvula, fauces, and tonsillar pillars visible)

## 2018-05-15 NOTE — DISCHARGE INSTRUCTIONS
Recovery After Procedural Sedation (Adult)  You have been given medicine by vein to make you sleep during your surgery. This may have included both a pain medicine and sleeping medicine. Most of the effects have worn off. But you may still have some drowsiness for the next 6 to 8 hours.  Home care  Follow these guidelines when you get home:  · For the next 8 hours, you should be watched by a responsible adult. This person should make sure your condition is not getting worse.  · Don't drink any alcohol for the next 24 hours.  · Don't drive, operate dangerous machinery, or make important business or personal decisions during the next 24 hours.  Note: Your healthcare provider may tell you not to take any medicine by mouth for pain or sleep in the next 4 hours. These medicines may react with the medicines you were given in the hospital. This could cause a much stronger response than usual.  Follow-up care  Follow up with your healthcare provider if you are not alert and back to your usual level of activity within 12 hours.  When to seek medical advice  Call your healthcare provider right away if any of these occur:  · Drowsiness gets worse  · Weakness or dizziness gets worse  · Repeated vomiting  · You can't be awakened   Date Last Reviewed: 10/18/2016  © 0481-2939 [employer]. [address] [place] [address], [place] [address]. All rights reserved. This information is not intended as a substitute for professional medical care. Always follow your healthcare professional's instructions.        Ischemic Colitis     Ischemic colitis happens if blood flow to a part of the colon is reduced.   Ischemic colitis happens when blood flow to the colon is reduced or blocked. Bloody diarrhea and severe belly pain are the most common symptoms. Other symptoms include vomiting, fever, and fainting. Diarrhea can lead to severe dehydration. This is the rapid loss of the fluids your body needs to function. Because of the severe  pain and the risk for dehydration, ischemic colitis should be treated right away.  Causes of ischemic colitis  The cause of the reduction or blockage of blood flow to the colon is not well understood. In some cases, a sudden drop in blood pressure, or dehydration, leads to an episode. Ischemic colitis is more likely in people with blood clotting problems or heart and blood vessel disease.  Diagnosing ischemic colitis  The presence of severe belly pain and bloody diarrhea is often enough to diagnose ischemic colitis. After these symptoms are treated, a test called a colonoscopy will likely be done. This helps rule out other colon problems. The test uses a thin, flexible scope with a light and camera on the end. The scope is inserted through the rectum into the colon. The scope sends pictures from inside the colon to a video screen. A small sample of tissue (biopsy) from the colon may be taken for further testing in a lab.  Treating ischemic colitis  Episodes are treated in the hospital. You may remain in the hospital for several days or longer:  · An IV line is put into a vein in your hand or arm. You will be given fluids through the IV to treat dehydration. You are also given IV pain medicines if you need them.  · You may be given IV antibiotics (medicines that treat infection).  · To rest the bowel, you will not eat or drink for a few days. In rare cases, when symptoms are very severe, you will be given nutrition through the IV.  · If you lost a lot of blood during the episode, you may receive a blood transfusion.  · In rare cases, an episode causes severe damage to the colon. In this case, surgery may need to be done to remove the damaged section. Your healthcare provider can tell you more if this is needed.  Follow-up  While you are being treated, your healthcare provider will work to find the cause of your ischemic colitis. After you recover, you may need to make lifestyle changes, such as quitting smoking, or  take medicines to decrease your risk of another episode. Call 911 or emergency services or go to the emergency room right away if your symptoms return.  Date Last Reviewed: 7/1/2016  © 9347-5136 [employer]. [address] [place] [address], [place] [address]. All rights reserved. This information is not intended as a substitute for professional medical care. Always follow your healthcare professional's instructions.

## 2018-05-15 NOTE — ANESTHESIA PREPROCEDURE EVALUATION
05/15/2018  Castillo Anderson Jr. is a 72 y.o., male.    Anesthesia Evaluation      I have reviewed the Medications.     Review of Systems  Anesthesia Hx:  No problems with previous Anesthesia   Social:  Smoker    Hematology/Oncology:        Hematology Comments: Plavix and asa tx   Cardiovascular:   PVD hyperlipidemia    Pulmonary:   COPD Sleep Apnea, CPAP    Renal/:  Renal/ Normal     Hepatic/GI:  Hepatic/GI Normal    Neurological:  Neurology Normal    Endocrine:  Endocrine Normal        Physical Exam  General:  Well nourished    Airway/Jaw/Neck:  Airway Findings: Mouth Opening: Normal General Airway Assessment: Adult  Mallampati: II  Jaw/Neck Findings:  Neck ROM: Extension Decreased, Mild       Chest/Lungs:  Chest/Lungs Findings: Clear to auscultation, Normal Respiratory Rate     Heart/Vascular:  Heart Findings: Rate: Normal  Rhythm: Regular Rhythm  Sounds: Normal  Heart murmur: negative Vascular Findings: Normal (No carotid bruits.)       Mental Status:  Mental Status Findings:  Cooperative, Alert and Oriented         Anesthesia Plan  Type of Anesthesia, risks & benefits discussed:  Anesthesia Type:  general  Patient's Preference:   Intra-op Monitoring Plan:   Intra-op Monitoring Plan Comments:   Post Op Pain Control Plan:   Post Op Pain Control Plan Comments:   Induction:   IV  Beta Blocker:  Patient is not currently on a Beta-Blocker (No further documentation required).       Informed Consent: Patient understands risks and agrees with Anesthesia plan.  Questions answered. Anesthesia consent signed with patient.  ASA Score: 3     Day of Surgery Review of History & Physical:        Anesthesia Plan Notes: Propofol general.        Ready For Surgery From Anesthesia Perspective.

## 2018-05-15 NOTE — DISCHARGE SUMMARY
Discharge Note  Short Stay      SUMMARY     Admit Date: 5/15/2018    Attending Physician: Quentin Morton MD     Discharge Physician: Quentin Morton MD    Discharge Date: 5/15/2018 8:17 AM    Final Diagnosis: Hx of colonic polyps [Z86.010]    Disposition: HOME OR SELF CARE    Patient Instructions:   Current Discharge Medication List      CONTINUE these medications which have NOT CHANGED    Details   aspirin 81 MG Chew 81 mg.      BREO ELLIPTA 200-25 mcg/dose DsDv diskus inhaler INHALE 1 PUFF BY MOUTH EVERY DAY  Qty: 60 each, Refills: 11      calcipotriene-betamethasone (TACLONEX) ointment       clopidogrel (PLAVIX) 75 mg tablet Take 75 mg by mouth once daily.      dicyclomine (BENTYL) 10 MG capsule Take 1 capsule (10 mg total) by mouth before meals and at bedtime as needed (abdominal cramping/pain).  Qty: 120 capsule, Refills: 0    Associated Diagnoses: Diarrhea, unspecified type      duloxetine (CYMBALTA) 60 MG capsule Take 60 mg by mouth once daily.    Instructed to take morning of surgery.      ezetimibe (ZETIA) 10 mg tablet TAKE 1 TABLET EVERY DAY  Qty: 90 tablet, Refills: 3    Associated Diagnoses: HLD (hyperlipidemia)      rosuvastatin (CRESTOR) 20 MG tablet TAKE 1 TABLET BY MOUTH EVERY DAY  Qty: 90 tablet, Refills: 2      SPIRIVA WITH HANDIHALER 18 mcg inhalation capsule INHALE 1 CAPSULE VIA HANDIHALER ONCE DAILY AT THE SAME TIME EVERY DAY  Qty: 30 capsule, Refills: 11      trazodone (DESYREL) 50 MG tablet Take 50 mg by mouth every evening. Every day             Discharge Procedure Orders (must include Diet, Follow-up, Activity)    Follow Up:  Follow up with PCP as previously scheduled  Resume routine diet.  Activity as tolerated.    No driving day of procedure.

## 2018-05-15 NOTE — TRANSFER OF CARE
"Anesthesia Transfer of Care Note    Patient: Castillo Anderson Jr.    Procedure(s) Performed: Procedure(s) (LRB):  COLONOSCOPY (N/A)    Patient location: PACU    Anesthesia Type: general    Transport from OR: Transported from OR on room air with adequate spontaneous ventilation    Post pain: adequate analgesia    Post assessment: no apparent anesthetic complications and tolerated procedure well    Post vital signs: stable    Level of consciousness: awake and alert    Nausea/Vomiting: no nausea/vomiting    Complications: none    Transfer of care protocol was followed      Last vitals:   Visit Vitals  /61 (BP Location: Right arm, Patient Position: Lying)   Pulse 87   Temp 36.5 °C (97.7 °F) (Skin)   Resp 16   Ht 5' 6" (1.676 m)   Wt 72.6 kg (160 lb)   SpO2 99%   BMI 25.82 kg/m²     "

## 2018-09-04 ENCOUNTER — TELEPHONE (OUTPATIENT)
Dept: FAMILY MEDICINE | Facility: CLINIC | Age: 73
End: 2018-09-04

## 2018-09-04 DIAGNOSIS — I73.9 PVD (PERIPHERAL VASCULAR DISEASE): Primary | ICD-10-CM

## 2018-09-04 NOTE — TELEPHONE ENCOUNTER
----- Message from Rox Rivera sent at 9/4/2018  3:10 PM CDT -----    Type:  Patient Requesting Referral    Who Called:  Nella coe   Calling   [facility] Does the patient already have the specialty appointment scheduled?:   Yes If yes, what is the date of that appointment?: Monday   Sept  10 Referral to What Specialty: heart    Is the specialist an [facility]?: non  [facility] Patient Requesting a Call Back?:  [phone]

## 2018-09-06 NOTE — TELEPHONE ENCOUNTER
----- Message from Dakota Casas sent at 9/6/2018  3:52 PM CDT -----  Contact: Alyssa/[facility]  Alyssa called in and wanted to check the status of a referral for the attached patient?  Alyssa's call back number is [phone]

## 2018-09-07 NOTE — PROGRESS NOTES
Refill Authorization Note     is requesting a refill authorization.    Brief assessment and rationale for refill: QUICK DC; RTS 12/18           Refills Authorized: No              Medication Therapy Plan: QUICK DC; RTS 12/18  Name and strength of medication: rosuvastatin (CRESTOR) 20 MG tablet  How patient will take medication: t1t qd  Medication reconciliation completed: No        Comments:

## 2018-09-10 RX ORDER — ROSUVASTATIN CALCIUM 20 MG/1
20 TABLET, COATED ORAL DAILY
Qty: 90 TABLET | Refills: 1 | Status: SHIPPED | OUTPATIENT
Start: 2018-09-10 | End: 2019-04-30 | Stop reason: SDUPTHER

## 2018-09-10 NOTE — PROGRESS NOTES
Refill Authorization Note     is requesting a refill authorization.    Brief assessment and rationale for refill: APPROVE: [employer] requesting  Amount/Quantity of medication ordered: 90d        Refills Authorized: No              Medication Therapy Plan: Technically early but different pharmacy; laura 6 more  Name and strength of medication: rosuvastatin (CRESTOR) 20 MG tablet  How patient will take medication: t1t qd  Medication reconciliation completed: No        Comments:   Lab Results   Component Value Date    CHOL 132 01/11/2017    CHOL 134 05/28/2015    CHOL 129 12/10/2013     Lab Results   Component Value Date    HDL 53 01/11/2017    HDL 57 05/28/2015    HDL 39 (L) 12/10/2013     Lab Results   Component Value Date    LDLCALC 67.4 01/11/2017    LDLCALC 63.8 05/28/2015    LDLCALC 63.8 12/10/2013     Lab Results   Component Value Date    TRIG 58 01/11/2017    TRIG 66 05/28/2015    TRIG 131 12/10/2013     Lab Results   Component Value Date    CHOLHDL 40.2 01/11/2017    CHOLHDL 42.5 05/28/2015    CHOLHDL 30.2 12/10/2013

## 2018-09-18 ENCOUNTER — TELEPHONE (OUTPATIENT)
Dept: FAMILY MEDICINE | Facility: CLINIC | Age: 73
End: 2018-09-18

## 2018-09-18 NOTE — TELEPHONE ENCOUNTER
----- Message from Kaley Gleason sent at 9/18/2018 10:31 AM CDT -----  Type: Needs Medical Advice    Who Called:  Dr. Koch/[facility]/  [facility] Call Back Number: [phone]  Additional Information: They have had to reschedule patient's appointment because they have not received office's referral. This has been going on since 9/4/18. They need the referral faxed to them at [phone].

## 2018-09-20 ENCOUNTER — TELEPHONE (OUTPATIENT)
Dept: FAMILY MEDICINE | Facility: CLINIC | Age: 73
End: 2018-09-20

## 2018-09-20 DIAGNOSIS — I73.9 PERIPHERAL VASCULAR DISEASE, UNSPECIFIED: Primary | ICD-10-CM

## 2018-09-20 NOTE — TELEPHONE ENCOUNTER
----- Message from Mona Rubio sent at 9/20/2018  7:14 AM CDT -----  Contact: Nella padilla/[facility] [phone]  She is requesting a corrected referral, the one that was sent has an incorrect doctor name.  Patient is seeing Dr Alvino Koch, please fax it to [phone].  Thank you!

## 2018-09-24 ENCOUNTER — TELEPHONE (OUTPATIENT)
Dept: FAMILY MEDICINE | Facility: CLINIC | Age: 73
End: 2018-09-24

## 2018-09-24 NOTE — TELEPHONE ENCOUNTER
----- Message from RT Axel sent at 9/24/2018  8:42 AM CDT -----  Contact: pt    pt , requesting a referral from Dr. Lemos to see another doctor (Dr. Alvino Koch) fax no. [phone], thanks.

## 2018-09-25 NOTE — TELEPHONE ENCOUNTER
Dr Lemos is out of the office at this time.  Will you please address this in his absence?   Pt has had to reschedule appt several times due to not having referral.

## 2018-09-27 NOTE — TELEPHONE ENCOUNTER
IS informed provider is not part of practice noted and city should be [place] and not [facility] ticket submitted

## 2018-09-27 NOTE — TELEPHONE ENCOUNTER
----- Message from Herb Montilla sent at 9/27/2018  8:58 AM CDT -----  Type: Needs Medical Advice    Who Called:  Susan Sousa with [facility]  Best Call Back Number: [phone]  Additional Information: Referral for Dr. Alvino Koch needs to be corrected - Current referal states [facility] - which they are not. It should read: [facility] - [address] [facility], [place] [address] - Please correct today as patient has appointment scheduled for 10.1.18

## 2018-10-30 DIAGNOSIS — E78.5 HLD (HYPERLIPIDEMIA): ICD-10-CM

## 2018-10-30 DIAGNOSIS — I65.29 STENOSIS OF CAROTID ARTERY, UNSPECIFIED LATERALITY: Primary | ICD-10-CM

## 2018-10-31 NOTE — PROGRESS NOTES
Refill Authorization Note     is requesting a refill authorization.    Brief assessment and rationale for refill: APPROVE: needs labs  Amount/Quantity of medication ordered: 90d        Refills Authorized: Yes  If authorized number of refills: 0        Medication-related problems identified: Requires labs  Medication Therapy Plan: Labs WNL; NTBO (CBC/Lipids); approve 3 more  Name and strength of medication: EZETIMIBE 10 MG Tablet  How patient will take medication: t1t qd  Medication reconciliation completed: No        Comments:   Lab Results   Component Value Date    CHOL 132 01/11/2017    CHOL 134 05/28/2015    CHOL 129 12/10/2013     Lab Results   Component Value Date    HDL 53 01/11/2017    HDL 57 05/28/2015    HDL 39 (L) 12/10/2013     Lab Results   Component Value Date    LDLCALC 67.4 01/11/2017    LDLCALC 63.8 05/28/2015    LDLCALC 63.8 12/10/2013     Lab Results   Component Value Date    TRIG 58 01/11/2017    TRIG 66 05/28/2015    TRIG 131 12/10/2013     Lab Results   Component Value Date    CHOLHDL 40.2 01/11/2017    CHOLHDL 42.5 05/28/2015    CHOLHDL 30.2 12/10/2013

## 2018-11-01 RX ORDER — EZETIMIBE 10 MG/1
TABLET ORAL
Qty: 90 TABLET | Refills: 0 | Status: SHIPPED | OUTPATIENT
Start: 2018-11-01 | End: 2019-03-08 | Stop reason: SDUPTHER

## 2018-11-13 ENCOUNTER — LAB VISIT (OUTPATIENT)
Dept: LAB | Facility: HOSPITAL | Age: 73
End: 2018-11-13
Attending: FAMILY MEDICINE
Payer: MEDICARE

## 2018-11-13 DIAGNOSIS — E78.5 HLD (HYPERLIPIDEMIA): ICD-10-CM

## 2018-11-13 DIAGNOSIS — I65.29 STENOSIS OF CAROTID ARTERY, UNSPECIFIED LATERALITY: ICD-10-CM

## 2018-11-13 LAB
CHOLEST SERPL-MCNC: 151 MG/DL
CHOLEST/HDLC SERPL: 2.9 {RATIO}
ERYTHROCYTE [DISTWIDTH] IN BLOOD BY AUTOMATED COUNT: 15.4 %
HCT VFR BLD AUTO: 51.5 %
HDLC SERPL-MCNC: 52 MG/DL
HDLC SERPL: 34.4 %
HGB BLD-MCNC: 16.1 G/DL
LDLC SERPL CALC-MCNC: 77.2 MG/DL
MCH RBC QN AUTO: 27.3 PG
MCHC RBC AUTO-ENTMCNC: 31.3 G/DL
MCV RBC AUTO: 87 FL
NONHDLC SERPL-MCNC: 99 MG/DL
PLATELET # BLD AUTO: 224 K/UL
PMV BLD AUTO: 9.9 FL
RBC # BLD AUTO: 5.9 M/UL
TRIGL SERPL-MCNC: 109 MG/DL
WBC # BLD AUTO: 6.57 K/UL

## 2018-11-13 PROCEDURE — 36415 COLL VENOUS BLD VENIPUNCTURE: CPT | Mod: HCWC,PO

## 2018-11-13 PROCEDURE — 85027 COMPLETE CBC AUTOMATED: CPT | Mod: HCWC

## 2018-11-13 PROCEDURE — 80061 LIPID PANEL: CPT | Mod: HCWC

## 2018-11-15 DIAGNOSIS — E78.5 HYPERLIPIDEMIA, UNSPECIFIED HYPERLIPIDEMIA TYPE: Primary | ICD-10-CM

## 2018-11-16 NOTE — PROGRESS NOTES
Refill Authorization Note     is requesting a refill authorization.    Brief assessment and rationale for refill: APPROVE: prr  Amount/Quantity of medication ordered: 90d        Refills Authorized: Yes  If authorized number of refills: 3           Medication Therapy Plan: Labs WNL; approve 12 more  Name and strength of medication: ROSUVASTATIN CALCIUM 20 MG TAB  How patient will take medication: t1t qd  Medication reconciliation completed: No        Comments:   Lab Results   Component Value Date    CHOL 151 11/13/2018    CHOL 132 01/11/2017    CHOL 134 05/28/2015     Lab Results   Component Value Date    HDL 52 11/13/2018    HDL 53 01/11/2017    HDL 57 05/28/2015     Lab Results   Component Value Date    LDLCALC 77.2 11/13/2018    LDLCALC 67.4 01/11/2017    LDLCALC 63.8 05/28/2015     Lab Results   Component Value Date    TRIG 109 11/13/2018    TRIG 58 01/11/2017    TRIG 66 05/28/2015     Lab Results   Component Value Date    CHOLHDL 34.4 11/13/2018    CHOLHDL 40.2 01/11/2017    CHOLHDL 42.5 05/28/2015

## 2018-11-16 NOTE — TELEPHONE ENCOUNTER
Spoke with Lennie (pharmacist) at [facility] in [place] and she could not tell if the request for crestor was an auto request or pt initiated. Reached out to pt and he said that he is not out of town and did not request a refill at [facility] and has enough of the medication at this time. Thanks

## 2018-11-19 RX ORDER — ROSUVASTATIN CALCIUM 20 MG/1
TABLET, COATED ORAL
Qty: 90 TABLET | Refills: 3 | OUTPATIENT
Start: 2018-11-19

## 2019-03-08 DIAGNOSIS — E78.5 HLD (HYPERLIPIDEMIA): ICD-10-CM

## 2019-03-08 RX ORDER — EZETIMIBE 10 MG/1
10 TABLET ORAL DAILY
Qty: 90 TABLET | Refills: 0 | Status: SHIPPED | OUTPATIENT
Start: 2019-03-08 | End: 2019-06-05 | Stop reason: SDUPTHER

## 2019-03-08 NOTE — TELEPHONE ENCOUNTER
----- Message from Aidee Werner sent at 3/8/2019 10:36 AM CST -----  Contact: patient  Type:  RX Refill Request    Who Called:  patient  Refill or New Rx:  ezetimibe refill  RX Name and Strength:   ezetimibe (ZETIA) 10 mg tablet  How is the patient currently taking it? (ex. 1XDay):  TAKE 1 TABLET EVERY DAY  Is this a 30 day or 90 day RX:  90    Preferred Pharmacy with phone number:    [facility]/pharmacy #[id number] - [place] - [address] [facility]  [address] [address] [address]  Phone: [phone] Fax: [phone]      Local or Mail Order:  local  Ordering Provider:  DR. Canelo Martin Call Back Number:  [phone]  Additional Information:

## 2019-03-11 ENCOUNTER — TELEPHONE (OUTPATIENT)
Dept: FAMILY MEDICINE | Facility: CLINIC | Age: 74
End: 2019-03-11

## 2019-03-11 DIAGNOSIS — I10 HYPERTENSION, UNSPECIFIED TYPE: ICD-10-CM

## 2019-03-11 DIAGNOSIS — Z12.5 PROSTATE CANCER SCREENING: ICD-10-CM

## 2019-03-11 DIAGNOSIS — E78.5 HYPERLIPIDEMIA, UNSPECIFIED HYPERLIPIDEMIA TYPE: Primary | ICD-10-CM

## 2019-03-11 NOTE — TELEPHONE ENCOUNTER
Requesting labs prior to appt. In note below he states annual, I am unable to sign per [facility] for annual.  Lipid, cbc, cmp, psa pended

## 2019-03-11 NOTE — TELEPHONE ENCOUNTER
----- Message from Mari Reyes sent at 3/11/2019  9:46 AM CDT -----  Contact: Self  Patient has an appt with the doctor on 4/3, requesting that you put his annual labs in the system and then call him back at [phone] (home) to let him know so he can have them drawn prior to appt.  Thanks

## 2019-03-12 ENCOUNTER — TELEPHONE (OUTPATIENT)
Dept: FAMILY MEDICINE | Facility: CLINIC | Age: 74
End: 2019-03-12

## 2019-03-12 NOTE — TELEPHONE ENCOUNTER
----- Message from [place] sent at 3/12/2019  9:47 AM CDT -----  Contact: pt  Type: Needs Medical Advice    Who Called:  pt  Best Call Back Number: [phone]  Additional Information: pt called looking to schedule some blood work to be done before his appt on 4/3

## 2019-03-20 ENCOUNTER — PATIENT OUTREACH (OUTPATIENT)
Dept: ADMINISTRATIVE | Facility: HOSPITAL | Age: 74
End: 2019-03-20

## 2019-03-20 NOTE — PROGRESS NOTES
Health Maintenance Due   Topic Date Due    Pneumococcal Vaccine (65+ Low/Medium Risk) (2 of 2 - PPSV23) 04/25/2018    Influenza Vaccine  08/01/2018    LDCT Lung Screen  01/30/2019

## 2019-03-28 ENCOUNTER — LAB VISIT (OUTPATIENT)
Dept: LAB | Facility: HOSPITAL | Age: 74
End: 2019-03-28
Attending: FAMILY MEDICINE
Payer: MEDICARE

## 2019-03-28 ENCOUNTER — PATIENT OUTREACH (OUTPATIENT)
Dept: ADMINISTRATIVE | Facility: HOSPITAL | Age: 74
End: 2019-03-28

## 2019-03-28 DIAGNOSIS — Z12.5 PROSTATE CANCER SCREENING: ICD-10-CM

## 2019-03-28 DIAGNOSIS — I10 HYPERTENSION, UNSPECIFIED TYPE: ICD-10-CM

## 2019-03-28 LAB
ALBUMIN SERPL BCP-MCNC: 3.8 G/DL (ref 3.5–5.2)
ALP SERPL-CCNC: 58 U/L (ref 55–135)
ALT SERPL W/O P-5'-P-CCNC: 18 U/L (ref 10–44)
ANION GAP SERPL CALC-SCNC: 8 MMOL/L (ref 8–16)
AST SERPL-CCNC: 19 U/L (ref 10–40)
BASOPHILS # BLD AUTO: 0.04 K/UL (ref 0–0.2)
BASOPHILS NFR BLD: 0.6 % (ref 0–1.9)
BILIRUB SERPL-MCNC: 0.4 MG/DL (ref 0.1–1)
BUN SERPL-MCNC: 11 MG/DL (ref 8–23)
CALCIUM SERPL-MCNC: 9.8 MG/DL (ref 8.7–10.5)
CHLORIDE SERPL-SCNC: 108 MMOL/L (ref 95–110)
CHOLEST SERPL-MCNC: 126 MG/DL (ref 120–199)
CHOLEST/HDLC SERPL: 2.3 {RATIO} (ref 2–5)
CO2 SERPL-SCNC: 26 MMOL/L (ref 23–29)
COMPLEXED PSA SERPL-MCNC: 2.8 NG/ML (ref 0–4)
CREAT SERPL-MCNC: 0.9 MG/DL (ref 0.5–1.4)
DIFFERENTIAL METHOD: ABNORMAL
EOSINOPHIL # BLD AUTO: 0.2 K/UL (ref 0–0.5)
EOSINOPHIL NFR BLD: 2.9 % (ref 0–8)
ERYTHROCYTE [DISTWIDTH] IN BLOOD BY AUTOMATED COUNT: 15.2 % (ref 11.5–14.5)
EST. GFR  (AFRICAN AMERICAN): >60 ML/MIN/1.73 M^2
EST. GFR  (NON AFRICAN AMERICAN): >60 ML/MIN/1.73 M^2
GLUCOSE SERPL-MCNC: 79 MG/DL (ref 70–110)
HCT VFR BLD AUTO: 50 % (ref 40–54)
HDLC SERPL-MCNC: 54 MG/DL (ref 40–75)
HDLC SERPL: 42.9 % (ref 20–50)
HGB BLD-MCNC: 15.4 G/DL (ref 14–18)
IMM GRANULOCYTES # BLD AUTO: 0.03 K/UL (ref 0–0.04)
IMM GRANULOCYTES NFR BLD AUTO: 0.4 % (ref 0–0.5)
LDLC SERPL CALC-MCNC: 55.4 MG/DL (ref 63–159)
LYMPHOCYTES # BLD AUTO: 2 K/UL (ref 1–4.8)
LYMPHOCYTES NFR BLD: 29.3 % (ref 18–48)
MCH RBC QN AUTO: 27.8 PG (ref 27–31)
MCHC RBC AUTO-ENTMCNC: 30.8 G/DL (ref 32–36)
MCV RBC AUTO: 90 FL (ref 82–98)
MONOCYTES # BLD AUTO: 0.6 K/UL (ref 0.3–1)
MONOCYTES NFR BLD: 8.3 % (ref 4–15)
NEUTROPHILS # BLD AUTO: 4 K/UL (ref 1.8–7.7)
NEUTROPHILS NFR BLD: 58.5 % (ref 38–73)
NONHDLC SERPL-MCNC: 72 MG/DL
NRBC BLD-RTO: 0 /100 WBC
PLATELET # BLD AUTO: 226 K/UL (ref 150–350)
PMV BLD AUTO: 10 FL (ref 9.2–12.9)
POTASSIUM SERPL-SCNC: 4.3 MMOL/L (ref 3.5–5.1)
PROT SERPL-MCNC: 6.8 G/DL (ref 6–8.4)
RBC # BLD AUTO: 5.54 M/UL (ref 4.6–6.2)
SODIUM SERPL-SCNC: 142 MMOL/L (ref 136–145)
TRIGL SERPL-MCNC: 83 MG/DL (ref 30–150)
WBC # BLD AUTO: 6.83 K/UL (ref 3.9–12.7)

## 2019-03-28 PROCEDURE — 85025 COMPLETE CBC W/AUTO DIFF WBC: CPT | Mod: HCNC

## 2019-03-28 PROCEDURE — 80061 LIPID PANEL: CPT | Mod: HCNC

## 2019-03-28 PROCEDURE — 80053 COMPREHEN METABOLIC PANEL: CPT | Mod: HCNC

## 2019-03-28 PROCEDURE — 84153 ASSAY OF PSA TOTAL: CPT | Mod: HCNC

## 2019-03-28 PROCEDURE — 36415 COLL VENOUS BLD VENIPUNCTURE: CPT | Mod: HCNC,PO

## 2019-03-28 RX ORDER — CLOPIDOGREL BISULFATE 75 MG/1
75 TABLET ORAL DAILY
Qty: 90 TABLET | Refills: 0 | Status: SHIPPED | OUTPATIENT
Start: 2019-03-28 | End: 2019-06-05 | Stop reason: SDUPTHER

## 2019-03-28 NOTE — PROGRESS NOTES
Portal outreach un-read by patient.  Will discuss the following  [facility] is committed to your overall health.  To help you get the most out of each of your visits, we will review your information to make sure you are up to date on all of your recommended tests and/or procedures.       Frantz Lemos MD   has found that your chart shows you may be due for the following:     Pneumonia Immunization   Influenza Vaccine     Reminder:  lab appointment 3/28/19     If you have had any of the above done at another facility, please bring the records with you or fax them to [phone] so that your record at [facility] will be complete. If you have not had any of these tests or procedures done recently and would like to complete this testing ,  please call [phone] or send a message through your [facility] portal to your provider's office.     If you have an upcoming scheduled appointment for the above test and/or procedures, please disregard this letter.

## 2019-03-28 NOTE — PROGRESS NOTES
Refill Authorization Note     is requesting a refill authorization.    Brief assessment and rationale for refill: ROUTE: op   Name and strength of medication: clopidogrel (PLAVIX) 75 mg tablet            Medication reconciliation completed: No                         Comments:   APPOINTMENTS (past 12 m or future 3m authorizing provider)  LAST VISIT DATE  Frantz Lemos MD 4/4/2018         NEXT VISIT DATE  Frantz Lemos MD 4/3/2019

## 2019-04-03 ENCOUNTER — OFFICE VISIT (OUTPATIENT)
Dept: FAMILY MEDICINE | Facility: CLINIC | Age: 74
End: 2019-04-03
Payer: MEDICARE

## 2019-04-03 ENCOUNTER — HOSPITAL ENCOUNTER (OUTPATIENT)
Dept: RADIOLOGY | Facility: HOSPITAL | Age: 74
Discharge: HOME | End: 2019-04-03
Attending: FAMILY MEDICINE
Payer: MEDICARE

## 2019-04-03 VITALS
WEIGHT: 159.81 LBS | TEMPERATURE: 98 F | DIASTOLIC BLOOD PRESSURE: 76 MMHG | OXYGEN SATURATION: 99 % | HEIGHT: 66 IN | HEART RATE: 62 BPM | SYSTOLIC BLOOD PRESSURE: 126 MMHG | BODY MASS INDEX: 25.68 KG/M2

## 2019-04-03 DIAGNOSIS — Z12.9 SCREENING FOR CANCER: ICD-10-CM

## 2019-04-03 DIAGNOSIS — Z87.891 PERSONAL HISTORY OF NICOTINE DEPENDENCE: ICD-10-CM

## 2019-04-03 DIAGNOSIS — J44.9 CHRONIC OBSTRUCTIVE PULMONARY DISEASE, UNSPECIFIED COPD TYPE: ICD-10-CM

## 2019-04-03 DIAGNOSIS — G47.00 INSOMNIA, UNSPECIFIED TYPE: ICD-10-CM

## 2019-04-03 DIAGNOSIS — F17.200 TOBACCO DEPENDENCE: ICD-10-CM

## 2019-04-03 DIAGNOSIS — M54.12 CERVICAL RADICULOPATHY: ICD-10-CM

## 2019-04-03 DIAGNOSIS — Z00.00 ROUTINE MEDICAL EXAM: Primary | ICD-10-CM

## 2019-04-03 PROCEDURE — 3078F DIAST BP <80 MM HG: CPT | Mod: HCNC,CPTII,S$GLB, | Performed by: FAMILY MEDICINE

## 2019-04-03 PROCEDURE — G0297 LDCT FOR LUNG CA SCREEN: HCPCS | Mod: TC,HCNC,PO

## 2019-04-03 PROCEDURE — 90732 PPSV23 VACC 2 YRS+ SUBQ/IM: CPT | Mod: HCNC,S$GLB,, | Performed by: FAMILY MEDICINE

## 2019-04-03 PROCEDURE — 3074F SYST BP LT 130 MM HG: CPT | Mod: HCNC,CPTII,S$GLB, | Performed by: FAMILY MEDICINE

## 2019-04-03 PROCEDURE — 90732 PNEUMOCOCCAL POLYSACCHARIDE VACCINE 23-VALENT =>2YO SQ IM: ICD-10-PCS | Mod: HCNC,S$GLB,, | Performed by: FAMILY MEDICINE

## 2019-04-03 PROCEDURE — 99999 PR PBB SHADOW E&M-EST. PATIENT-LVL III: CPT | Mod: PBBFAC,HCNC,, | Performed by: FAMILY MEDICINE

## 2019-04-03 PROCEDURE — G0009 ADMIN PNEUMOCOCCAL VACCINE: HCPCS | Mod: HCNC,S$GLB,, | Performed by: FAMILY MEDICINE

## 2019-04-03 PROCEDURE — 99397 PR PREVENTIVE VISIT,EST,65 & OVER: ICD-10-PCS | Mod: 25,HCNC,S$GLB, | Performed by: FAMILY MEDICINE

## 2019-04-03 PROCEDURE — G0009 PNEUMOCOCCAL POLYSACCHARIDE VACCINE 23-VALENT =>2YO SQ IM: ICD-10-PCS | Mod: HCNC,S$GLB,, | Performed by: FAMILY MEDICINE

## 2019-04-03 PROCEDURE — 99397 PER PM REEVAL EST PAT 65+ YR: CPT | Mod: 25,HCNC,S$GLB, | Performed by: FAMILY MEDICINE

## 2019-04-03 PROCEDURE — 99999 PR PBB SHADOW E&M-EST. PATIENT-LVL III: ICD-10-PCS | Mod: PBBFAC,HCNC,, | Performed by: FAMILY MEDICINE

## 2019-04-03 PROCEDURE — G0297 LDCT FOR LUNG CA SCREEN: HCPCS | Mod: 26,HCNC,, | Performed by: RADIOLOGY

## 2019-04-03 PROCEDURE — G0297 CT CHEST LUNG SCREENING LOW DOSE: ICD-10-PCS | Mod: 26,HCNC,, | Performed by: RADIOLOGY

## 2019-04-03 PROCEDURE — 3078F PR MOST RECENT DIASTOLIC BLOOD PRESSURE < 80 MM HG: ICD-10-PCS | Mod: HCNC,CPTII,S$GLB, | Performed by: FAMILY MEDICINE

## 2019-04-03 PROCEDURE — 3074F PR MOST RECENT SYSTOLIC BLOOD PRESSURE < 130 MM HG: ICD-10-PCS | Mod: HCNC,CPTII,S$GLB, | Performed by: FAMILY MEDICINE

## 2019-04-03 RX ORDER — PREDNISONE 10 MG/1
TABLET ORAL
Qty: 20 TABLET | Refills: 0 | Status: SHIPPED | OUTPATIENT
Start: 2019-04-03 | End: 2019-09-09 | Stop reason: HOSPADM

## 2019-04-03 RX ORDER — TIOTROPIUM BROMIDE 18 UG/1
CAPSULE ORAL; RESPIRATORY (INHALATION)
Qty: 30 CAPSULE | Refills: 11 | Status: SHIPPED | OUTPATIENT
Start: 2019-04-03 | End: 2019-09-09 | Stop reason: HOSPADM

## 2019-04-03 RX ORDER — FLUTICASONE FUROATE AND VILANTEROL 200; 25 UG/1; UG/1
1 POWDER RESPIRATORY (INHALATION) DAILY
Qty: 60 EACH | Refills: 11 | Status: SHIPPED | OUTPATIENT
Start: 2019-04-03 | End: 2019-09-09 | Stop reason: HOSPADM

## 2019-04-03 RX ORDER — ALBUTEROL SULFATE 90 UG/1
2 AEROSOL, METERED RESPIRATORY (INHALATION) EVERY 6 HOURS PRN
Qty: 1 EACH | Refills: 11 | Status: SHIPPED | OUTPATIENT
Start: 2019-04-03 | End: 2019-09-09 | Stop reason: HOSPADM

## 2019-04-03 RX ORDER — NAPROXEN 500 MG/1
TABLET ORAL DAILY PRN
COMMUNITY
Start: 2019-03-01 | End: 2019-09-09 | Stop reason: HOSPADM

## 2019-04-03 NOTE — PROGRESS NOTES
Subjective:       Patient ID: Castillo Anderson Jr. is a 73 y.o. male.    Chief Complaint: Annual Exam    HPI       Here for a check up.    Reviewed recent labs.  All wnl.      Copd stable. No worsening cough or wheeze.  Smoker.      Depression and insomnia stable.      No worsening alopecia on area below chin.     Reports that 2 months ago, started having right cervical pain radiating to right hand and was given steroid injection while in [facility].  Reports residual right sided neck pain with some tingling of right lateral arm and palmar right hand.  Ps: 2/10.           Review of Systems      Review of Systems   Constitutional: Negative for fever and chills.   HENT: Negative for hearing loss and neck stiffness.    Eyes: Negative for redness and itching.   Respiratory: Negative for cough and choking.    Cardiovascular: Negative for chest pain and leg swelling.  Abdomen: Negative for abdominal pain and blood in stool.   Genitourinary: Negative for dysuria and flank pain.   Musculoskeletal: Negative for back pain and gait problem.   Neurological: Negative for light-headedness and headaches.   Hematological: Negative for adenopathy.   Psychiatric/Behavioral: Negative for behavioral problems.       Objective:      Physical Exam   Constitutional: He is oriented to person, place, and time. He appears well-developed and well-nourished.   HENT:   Head: Normocephalic and atraumatic.   Eyes: Pupils are equal, round, and reactive to light. Conjunctivae are normal.   Neck: Normal range of motion. Neck supple.   Cardiovascular: Normal rate, regular rhythm and normal heart sounds. Exam reveals no friction rub.   No murmur heard.  Pulmonary/Chest: Effort normal and breath sounds normal.   Abdominal: Soft. Bowel sounds are normal. There is no tenderness.   Musculoskeletal:   Cervical spine: tend of right paravert area.  Dec rom with flex/ext. Positive spurling to the right.    Lymphadenopathy:     He has no cervical adenopathy.    Neurological: He is alert and oriented to person, place, and time. He has normal reflexes. No cranial nerve deficit. Coordination normal.   Skin: Skin is warm and dry.   Psychiatric: He has a normal mood and affect. His behavior is normal.       Assessment:       1. Routine medical exam    2. Screening for cancer    3. Personal history of nicotine dependence     4. Chronic obstructive pulmonary disease, unspecified COPD type    5. Tobacco dependence    6. Insomnia, unspecified type    7. Cervical radiculopathy        Plan:       Routine medical exam    Screening for cancer  -     CT Chest Lung Screening Low Dose; Future; Expected date: 04/03/2019    Personal history of nicotine dependence   -     CT Chest Lung Screening Low Dose; Future; Expected date: 04/03/2019    Chronic obstructive pulmonary disease, unspecified COPD type    Tobacco dependence    Insomnia, unspecified type    Cervical radiculopathy    Other orders  -     fluticasone-vilanterol (BREO ELLIPTA) 200-25 mcg/dose DsDv diskus inhaler; Inhale 1 puff into the lungs once daily. Controller  Dispense: 60 each; Refill: 11  -     tiotropium (SPIRIVA WITH HANDIHALER) 18 mcg inhalation capsule; INHALE 1 CAPSULE VIA HANDIHALER ONCE DAILY AT THE SAME TIME EVERY DAY  Dispense: 30 capsule; Refill: 11  -     albuterol (PROVENTIL/VENTOLIN HFA) 90 mcg/actuation inhaler; Inhale 2 puffs into the lungs every 6 (six) hours as needed for Wheezing.  Dispense: 1 each; Refill: 11  -     predniSONE (DELTASONE) 10 MG tablet; Take 4 tabs daily for 2 days then Take 3 tabs daily for 2 days thenTake 2 tabs daily for 2 days then Take 1 tab daily for 2 days then stop  Dispense: 20 tablet; Refill: 0  -     (In Office Administered) Pneumococcal Polysaccharide Vaccine (23 Valent) (SQ/IM)                Plan:  Ct scan in 1-2 weeks  Recommend weaning off cigarettes per week with replacement of mints  Start advair and spiriva  Start prednisone taper to take next week for cervical  radicular pain  Cont all other meds        Medication List with Changes/Refills   New Medications    ALBUTEROL (PROVENTIL/VENTOLIN HFA) 90 MCG/ACTUATION INHALER    Inhale 2 puffs into the lungs every 6 (six) hours as needed for Wheezing.    PREDNISONE (DELTASONE) 10 MG TABLET    Take 4 tabs daily for 2 days then Take 3 tabs daily for 2 days thenTake 2 tabs daily for 2 days then Take 1 tab daily for 2 days then stop   Current Medications    ASPIRIN 81 MG CHEW    81 mg.    CALCIPOTRIENE-BETAMETHASONE (TACLONEX) OINTMENT        CLOPIDOGREL (PLAVIX) 75 MG TABLET    Take 1 tablet (75 mg total) by mouth once daily.    DULOXETINE (CYMBALTA) 60 MG CAPSULE    Take 60 mg by mouth once daily.    Instructed to take morning of surgery.    EZETIMIBE (ZETIA) 10 MG TABLET    Take 1 tablet (10 mg total) by mouth once daily.    MESALAMINE (APRISO) 0.375 GRAM CP24    Take 4 capsules (1.5 g total) by mouth once daily.    NAPROXEN (NAPROSYN) 500 MG TABLET    Take by mouth daily as needed.    ROSUVASTATIN (CRESTOR) 20 MG TABLET    Take 1 tablet (20 mg total) by mouth once daily.    TRAZODONE (DESYREL) 50 MG TABLET    Take 50 mg by mouth every evening. Every day   Changed and/or Refilled Medications    Modified Medication Previous Medication    FLUTICASONE-VILANTEROL (BREO ELLIPTA) 200-25 MCG/DOSE DSDV DISKUS INHALER BREO ELLIPTA 200-25 mcg/dose DsDv diskus inhaler       Inhale 1 puff into the lungs once daily. Controller    INHALE 1 PUFF BY MOUTH EVERY DAY    TIOTROPIUM (SPIRIVA WITH HANDIHALER) 18 MCG INHALATION CAPSULE SPIRIVA WITH HANDIHALER 18 mcg inhalation capsule       INHALE 1 CAPSULE VIA HANDIHALER ONCE DAILY AT THE SAME TIME EVERY DAY    INHALE 1 CAPSULE VIA HANDIHALER ONCE DAILY AT THE SAME TIME EVERY DAY   Discontinued Medications    DICYCLOMINE (BENTYL) 10 MG CAPSULE    Take 1 capsule (10 mg total) by mouth before meals and at bedtime as needed (abdominal cramping/pain).

## 2019-04-08 ENCOUNTER — TELEPHONE (OUTPATIENT)
Dept: FAMILY MEDICINE | Facility: CLINIC | Age: 74
End: 2019-04-08

## 2019-04-08 NOTE — PROGRESS NOTES
inform pt via phone that I reviewed the test results and note the following:    Ct scan shows no lesions with one in the right and the other in the left lung. Recommend that he see pulmonology.

## 2019-05-01 RX ORDER — ROSUVASTATIN CALCIUM 20 MG/1
TABLET, COATED ORAL
Qty: 90 TABLET | Refills: 1 | Status: SHIPPED | OUTPATIENT
Start: 2019-05-01 | End: 2019-10-31 | Stop reason: SDUPTHER

## 2019-06-05 DIAGNOSIS — E78.5 HLD (HYPERLIPIDEMIA): ICD-10-CM

## 2019-06-07 RX ORDER — EZETIMIBE 10 MG/1
TABLET ORAL
Qty: 90 TABLET | Refills: 2 | Status: SHIPPED | OUTPATIENT
Start: 2019-06-07 | End: 2020-03-04

## 2019-06-07 RX ORDER — CLOPIDOGREL BISULFATE 75 MG/1
TABLET ORAL
Qty: 90 TABLET | Refills: 0 | Status: SHIPPED | OUTPATIENT
Start: 2019-06-07 | End: 2019-08-26 | Stop reason: SDUPTHER

## 2019-06-07 NOTE — PROGRESS NOTES
Refill Authorization Note     is requesting a refill authorization.    Brief assessment and rationale for refill: DEFER: low adherence/still smoking (clopidogrel) / ezetimibe (not commented on)  Name and strength of medication: as above  Medication-related problems identified:   Drug-disease interaction  No indication for a medication    Medication Therapy Plan: Pt still reports still smoking; PVD/HLipidemia not commented by you recently; defer both                   How patient will take medication: UTD          Comments:   Lab Results   Component Value Date    WBC 6.83 03/28/2019    HGB 15.4 03/28/2019    HCT 50.0 03/28/2019    MCV 90 03/28/2019     03/28/2019      Lab Results   Component Value Date    CHOL 126 03/28/2019    CHOL 151 11/13/2018    CHOL 132 01/11/2017     Lab Results   Component Value Date    HDL 54 03/28/2019    HDL 52 11/13/2018    HDL 53 01/11/2017     Lab Results   Component Value Date    LDLCALC 55.4 (L) 03/28/2019    LDLCALC 77.2 11/13/2018    LDLCALC 67.4 01/11/2017     Lab Results   Component Value Date    TRIG 83 03/28/2019    TRIG 109 11/13/2018    TRIG 58 01/11/2017     Lab Results   Component Value Date    CHOLHDL 42.9 03/28/2019    CHOLHDL 34.4 11/13/2018    CHOLHDL 40.2 01/11/2017

## 2019-08-26 DIAGNOSIS — E78.5 HYPERLIPIDEMIA, UNSPECIFIED HYPERLIPIDEMIA TYPE: Primary | ICD-10-CM

## 2019-08-26 RX ORDER — CLOPIDOGREL BISULFATE 75 MG/1
TABLET ORAL
Qty: 90 TABLET | Refills: 0 | Status: SHIPPED | OUTPATIENT
Start: 2019-08-26 | End: 2020-03-04

## 2019-08-26 NOTE — PROGRESS NOTES
Refill Authorization Note     is requesting a refill authorization.    Brief assessment and rationale for refill: ROUTE: op   Name and strength of medication: clopidogrel (PLAVIX) 75 mg tablet       Medication Therapy Plan: Outside of protocol, Route to you     Medication reconciliation completed: No              How patient will take medication: t1t po qd           Comments:   APPOINTMENTS past 12m or future 3m    Date Provider   LAST VISIT  4/3/2019 Frantz Lemos MD   NEXT VISIT  Visit date not found Frantz Lemos MD

## 2019-08-28 RX ORDER — ROSUVASTATIN CALCIUM 20 MG/1
20 TABLET, COATED ORAL DAILY
Qty: 90 TABLET | Refills: 1 | OUTPATIENT
Start: 2019-08-28

## 2019-08-28 NOTE — PROGRESS NOTES
Refill Authorization Note     is requesting a refill authorization.    Brief assessment and rationale for refill: QUICK DC: rts  Name and strength of medication: ROSUVASTATIN CALCIUM 20 MG Tablet       Medication Therapy Plan: Refill too soon (11/19); 6-month rx sent 5/19; Lipids WNL 3/19; QUICK DC    Medication reconciliation completed: No              How patient will take medication: t1t qd          Lab Results   Component Value Date    CHOL 126 03/28/2019    CHOL 151 11/13/2018    CHOL 132 01/11/2017     Lab Results   Component Value Date    HDL 54 03/28/2019    HDL 52 11/13/2018    HDL 53 01/11/2017     Lab Results   Component Value Date    LDLCALC 55.4 (L) 03/28/2019    LDLCALC 77.2 11/13/2018    LDLCALC 67.4 01/11/2017     Lab Results   Component Value Date    TRIG 83 03/28/2019    TRIG 109 11/13/2018    TRIG 58 01/11/2017     Lab Results   Component Value Date    CHOLHDL 42.9 03/28/2019    CHOLHDL 34.4 11/13/2018    CHOLHDL 40.2 01/11/2017

## 2019-08-28 NOTE — PROGRESS NOTES
Refill Authorization Note     is requesting a refill authorization.    Brief assessment and rationale for refill: APPROVE: prr  Name and strength of medication: ROSUVASTATIN CALCIUM 20 MG Tablet       Medication Therapy Plan: Lipids WNL 3/19; approve 6 more    Medication reconciliation completed: No              How patient will take medication: t1t qd          Comments:   LAST LIPIDS (12 months)  Lab Results   Component Value Date    CHOL 126 03/28/2019    CHOL 151 11/13/2018    CHOL 132 01/11/2017    Lab Results   Component Value Date    HDL 54 03/28/2019    HDL 52 11/13/2018    HDL 53 01/11/2017      Lab Results   Component Value Date    TRIG 83 03/28/2019    TRIG 109 11/13/2018    TRIG 58 01/11/2017    Lab Results   Component Value Date    CHOLHDL 42.9 03/28/2019    CHOLHDL 34.4 11/13/2018    CHOLHDL 40.2 01/11/2017       Lab Results   Component Value Date    LDLCALC 55.4 (L) 03/28/2019    LDLCALC 77.2 11/13/2018    LDLCALC 67.4 01/11/2017        LAST LFTs (12 months)   Lab Results   Component Value Date    ALT 18 03/28/2019    ALT 20 03/20/2018    ALT 26 01/11/2017    Lab Results   Component Value Date    AST 19 03/28/2019    AST 18 03/20/2018    AST 22 01/11/2017      Lab Results   Component Value Date    BILITOT 0.4 03/28/2019    BILITOT 0.4 03/20/2018    BILITOT 0.4 01/11/2017    LFT/TOTBILI-wnl, or within 3x the UNL     APPOINTMENTS with PCP (past 12m or future 3m authorizing provider)    Date Provider   LAST VISIT  04/03/2019 Frantz Lemos MD   NEXT VISIT  No appt found Frantz Lemos MD

## 2019-08-30 ENCOUNTER — HOSPITAL ENCOUNTER (OUTPATIENT)
Dept: RADIOLOGY | Facility: HOSPITAL | Age: 74
Discharge: HOME | End: 2019-08-30
Attending: FAMILY MEDICINE
Payer: MEDICARE

## 2019-08-30 ENCOUNTER — OFFICE VISIT (OUTPATIENT)
Dept: FAMILY MEDICINE | Facility: CLINIC | Age: 74
End: 2019-08-30
Payer: MEDICARE

## 2019-08-30 ENCOUNTER — TELEPHONE (OUTPATIENT)
Dept: FAMILY MEDICINE | Facility: CLINIC | Age: 74
End: 2019-08-30

## 2019-08-30 VITALS
SYSTOLIC BLOOD PRESSURE: 130 MMHG | HEART RATE: 109 BPM | OXYGEN SATURATION: 97 % | HEIGHT: 66 IN | DIASTOLIC BLOOD PRESSURE: 74 MMHG | BODY MASS INDEX: 24.87 KG/M2 | WEIGHT: 154.75 LBS

## 2019-08-30 DIAGNOSIS — E04.1 THYROID NODULE: Primary | ICD-10-CM

## 2019-08-30 DIAGNOSIS — J44.9 CHRONIC OBSTRUCTIVE PULMONARY DISEASE, UNSPECIFIED COPD TYPE: ICD-10-CM

## 2019-08-30 DIAGNOSIS — F32.A DEPRESSION, UNSPECIFIED DEPRESSION TYPE: ICD-10-CM

## 2019-08-30 DIAGNOSIS — E04.1 THYROID NODULE: ICD-10-CM

## 2019-08-30 DIAGNOSIS — G47.00 INSOMNIA, UNSPECIFIED TYPE: ICD-10-CM

## 2019-08-30 PROCEDURE — 76536 US EXAM OF HEAD AND NECK: CPT | Mod: 26,HCNC,, | Performed by: RADIOLOGY

## 2019-08-30 PROCEDURE — 99214 PR OFFICE/OUTPT VISIT, EST, LEVL IV, 30-39 MIN: ICD-10-PCS | Mod: HCNC,S$GLB,, | Performed by: FAMILY MEDICINE

## 2019-08-30 PROCEDURE — 3288F PR FALLS RISK ASSESSMENT DOCUMENTED: ICD-10-PCS | Mod: HCNC,CPTII,S$GLB, | Performed by: FAMILY MEDICINE

## 2019-08-30 PROCEDURE — 3075F PR MOST RECENT SYSTOLIC BLOOD PRESS GE 130-139MM HG: ICD-10-PCS | Mod: HCNC,CPTII,S$GLB, | Performed by: FAMILY MEDICINE

## 2019-08-30 PROCEDURE — 99214 OFFICE O/P EST MOD 30 MIN: CPT | Mod: HCNC,S$GLB,, | Performed by: FAMILY MEDICINE

## 2019-08-30 PROCEDURE — 3075F SYST BP GE 130 - 139MM HG: CPT | Mod: HCNC,CPTII,S$GLB, | Performed by: FAMILY MEDICINE

## 2019-08-30 PROCEDURE — 1100F PR PT FALLS ASSESS DOC 2+ FALLS/FALL W/INJURY/YR: ICD-10-PCS | Mod: HCNC,CPTII,S$GLB, | Performed by: FAMILY MEDICINE

## 2019-08-30 PROCEDURE — 76536 US SOFT TISSUE HEAD NECK THYROID: ICD-10-PCS | Mod: 26,HCNC,, | Performed by: RADIOLOGY

## 2019-08-30 PROCEDURE — 99999 PR PBB SHADOW E&M-EST. PATIENT-LVL III: ICD-10-PCS | Mod: PBBFAC,HCNC,, | Performed by: FAMILY MEDICINE

## 2019-08-30 PROCEDURE — 3288F FALL RISK ASSESSMENT DOCD: CPT | Mod: HCNC,CPTII,S$GLB, | Performed by: FAMILY MEDICINE

## 2019-08-30 PROCEDURE — 1100F PTFALLS ASSESS-DOCD GE2>/YR: CPT | Mod: HCNC,CPTII,S$GLB, | Performed by: FAMILY MEDICINE

## 2019-08-30 PROCEDURE — 3078F DIAST BP <80 MM HG: CPT | Mod: HCNC,CPTII,S$GLB, | Performed by: FAMILY MEDICINE

## 2019-08-30 PROCEDURE — 3078F PR MOST RECENT DIASTOLIC BLOOD PRESSURE < 80 MM HG: ICD-10-PCS | Mod: HCNC,CPTII,S$GLB, | Performed by: FAMILY MEDICINE

## 2019-08-30 PROCEDURE — 76536 US EXAM OF HEAD AND NECK: CPT | Mod: TC,HCNC,PO

## 2019-08-30 PROCEDURE — 99999 PR PBB SHADOW E&M-EST. PATIENT-LVL III: CPT | Mod: PBBFAC,HCNC,, | Performed by: FAMILY MEDICINE

## 2019-08-30 RX ORDER — DOXYCYCLINE 100 MG/1
CAPSULE ORAL
COMMUNITY
End: 2019-09-09 | Stop reason: HOSPADM

## 2019-08-30 RX ORDER — NITROGLYCERIN 0.4 MG/1
TABLET SUBLINGUAL
COMMUNITY

## 2019-08-30 RX ORDER — NEOMYCIN SULFATE, POLYMYXIN B SULFATE AND DEXAMETHASONE 3.5; 10000; 1 MG/ML; [USP'U]/ML; MG/ML
SUSPENSION/ DROPS OPHTHALMIC
COMMUNITY
End: 2019-09-09 | Stop reason: HOSPADM

## 2019-08-30 RX ORDER — DIFLUPREDNATE OPHTHALMIC 0.5 MG/ML
EMULSION OPHTHALMIC
COMMUNITY
End: 2019-09-09 | Stop reason: HOSPADM

## 2019-08-30 RX ORDER — OXYCODONE AND ACETAMINOPHEN 5; 325 MG/1; MG/1
TABLET ORAL
COMMUNITY
End: 2019-09-09 | Stop reason: HOSPADM

## 2019-08-30 RX ORDER — NITROGLYCERIN 0.4 MG/1
TABLET SUBLINGUAL
Refills: 2 | COMMUNITY
Start: 2019-05-31 | End: 2020-03-04 | Stop reason: SDUPTHER

## 2019-08-30 RX ORDER — TAMSULOSIN HYDROCHLORIDE 0.4 MG/1
CAPSULE ORAL
COMMUNITY
Start: 2019-08-05 | End: 2020-03-05

## 2019-08-30 RX ORDER — TRAMADOL HYDROCHLORIDE 50 MG/1
TABLET ORAL
COMMUNITY
Start: 2019-08-21 | End: 2019-09-09 | Stop reason: HOSPADM

## 2019-08-30 RX ORDER — CEPHALEXIN 500 MG/1
CAPSULE ORAL
COMMUNITY
Start: 2019-08-21 | End: 2019-09-09 | Stop reason: HOSPADM

## 2019-08-30 RX ORDER — LANOLIN ALCOHOL/MO/W.PET/CERES
CREAM (GRAM) TOPICAL
COMMUNITY
End: 2020-03-05

## 2019-08-30 RX ORDER — BETAMETHASONE DIPROPIONATE 0.5 MG/G
CREAM TOPICAL
COMMUNITY
End: 2019-09-09 | Stop reason: HOSPADM

## 2019-08-30 RX ORDER — OFLOXACIN 3 MG/ML
SOLUTION/ DROPS OPHTHALMIC
COMMUNITY
End: 2019-09-09 | Stop reason: HOSPADM

## 2019-08-30 RX ORDER — TAMSULOSIN HYDROCHLORIDE 0.4 MG/1
CAPSULE ORAL
COMMUNITY
End: 2019-09-09 | Stop reason: HOSPADM

## 2019-08-30 RX ORDER — ESCITALOPRAM OXALATE 10 MG/1
TABLET ORAL
COMMUNITY
End: 2022-01-13

## 2019-08-30 NOTE — TELEPHONE ENCOUNTER
----- Message from Yanira Champion sent at 8/30/2019  8:42 AM CDT -----  Type: Needs Medical Advice    Who Called:  Patient     Best Call Back Number:  [phone]   Additional Information: pt goes back and forth between [place] and [place] - He is currently in [place] right now however he is going to be leaving 9/20 to go back to [place] and wont be back until 10/2 So we are going to be needing an appointment before 9/20 - The issue is he had a CT scan on this throat done in [place] on 8/27 Tuesday and they found small nodules on right side of the thyroid and would like for Dr Lemos to review those results and direct him on the next step weather it be a referral or treatment - needs advise - I have provided the pt with the fax # for Dr Lemos he is going to have the Drs office in [place] fax over the results for Dr Lemos to review please contact pt directly and advise

## 2019-08-30 NOTE — PROGRESS NOTES
Subjective:       Patient ID: Castillo Anderson Jr. is a 73 y.o. male.    Chief Complaint: Results    HPI     Here for a f/u.     Had a ct neck done in [facility] on 8/28/19. Showed right thyroid nodule measuring 1.7 x 1 cm in size.    Copd stable. No worsening cough or wheeze.  Smoker. sees Dr. Zheng, pulmonology for management.      Depression and insomnia stable.     Had a mohs surgery on right dist ant tib/fib area about 9 days ago. On antibiotic and has dressing on shin.      Review of Systems      Review of Systems   Constitutional: Negative for fever and chills.   HENT: Negative for hearing loss and neck stiffness.    Eyes: Negative for redness and itching.   Respiratory: Negative for cough and choking.    Cardiovascular: Negative for chest pain and leg swelling.  Abdomen: Negative for abdominal pain and blood in stool.   Genitourinary: Negative for dysuria and flank pain.   Musculoskeletal: Negative for back pain and gait problem.   Neurological: Negative for light-headedness and headaches.   Hematological: Negative for adenopathy.   Psychiatric/Behavioral: Negative for behavioral problems.     Objective:      Physical Exam   HENT:   Head: Atraumatic.   Eyes: Pupils are equal, round, and reactive to light. Conjunctivae are normal.   Neck: Normal range of motion.   Cardiovascular: Normal rate and regular rhythm.   No murmur heard.  Pulmonary/Chest: Effort normal and breath sounds normal. He has no wheezes.   Lymphadenopathy:     He has no cervical adenopathy.   Skin:   Right anterior tib/fib area: inverted L shaped incision with stitches.  Non tender.        Assessment:       1. Thyroid nodule    2. Chronic obstructive pulmonary disease, unspecified COPD type    3. Insomnia, unspecified type    4. Depression, unspecified depression type        Plan:       Thyroid nodule  -     US Soft Tissue Head Neck Thyroid; Future; Expected date: 08/30/2019  -     US Biopsy Thyroid; Future; Expected date: 08/30/2019  -      TSH; Future; Expected date: 08/30/2019    Chronic obstructive pulmonary disease, unspecified COPD type    Insomnia, unspecified type    Depression, unspecified depression type        Plan:  See orders  Cont current meds      Medication List with Changes/Refills   Current Medications    ALBUTEROL (PROVENTIL/VENTOLIN HFA) 90 MCG/ACTUATION INHALER    Inhale 2 puffs into the lungs every 6 (six) hours as needed for Wheezing.    ASPIRIN 81 MG CHEW    81 mg.    BETAMETHASONE DIPROPIONATE (DIPROLENE) 0.05 % CREAM    betamethasone dipropionate 0.05 % topical cream    CALCIPOTRIENE-BETAMETHASONE (TACLONEX) OINTMENT        CEPHALEXIN (KEFLEX) 500 MG CAPSULE        CLOPIDOGREL (PLAVIX) 75 MG TABLET    TAKE 1 TABLET EVERY DAY    CYANOCOBALAMIN (VITAMIN B-12) 1000 MCG TABLET    Vitamin B-12  1,000 mcg tablet   Take 1 tablet every day by oral route.    DIFLUPREDNATE (DUREZOL) 0.05 % DROP OPHTHALMIC SOLUTION    Durezol 0.05 % eye drops    DOXYCYCLINE (VIBRAMYCIN) 100 MG CAP    doxycycline hyclate 100 mg capsule    DULOXETINE (CYMBALTA) 60 MG CAPSULE    Take 60 mg by mouth once daily.    Instructed to take morning of surgery.    ESCITALOPRAM OXALATE (LEXAPRO) 10 MG TABLET    escitalopram 10 mg tablet    EZETIMIBE (ZETIA) 10 MG TABLET    TAKE 1 TABLET EVERY DAY    FLUTICASONE-VILANTEROL (BREO ELLIPTA) 200-25 MCG/DOSE DSDV DISKUS INHALER    Inhale 1 puff into the lungs once daily. Controller    MESALAMINE (APRISO) 0.375 GRAM CP24    Take 4 capsules (1.5 g total) by mouth once daily.    NAPROXEN (NAPROSYN) 500 MG TABLET    Take by mouth daily as needed.    NEOMYCIN-POLYMYXIN-DEXAMETHASONE (MAXITROL) 3.5MG/ML-10,000 UNIT/ML-0.1 % DRPS    neomycin-polymyxin-dexameth 3.5 mg/mL-10,000 unit/mL-0.1% eye drops    NEPAFENAC (ILEVRO) 0.3 % DRPS    Ilevro 0.3 % eye drops,suspension    NITROGLYCERIN (NITROSTAT) 0.4 MG SL TABLET        NITROGLYCERIN (NITROSTAT) 0.4 MG SL TABLET    nitroglycerin 0.4 mg sublingual tablet    OFLOXACIN (OCUFLOX)  0.3 % OPHTHALMIC SOLUTION    ofloxacin 0.3 % eye drops    OXYCODONE-ACETAMINOPHEN (PERCOCET) 5-325 MG PER TABLET    oxycodone-acetaminophen 5 mg-325 mg tablet   Take 1 tablet every 6-8 hours by oral route as needed for 3 days.    PREDNISONE (DELTASONE) 10 MG TABLET    Take 4 tabs daily for 2 days then Take 3 tabs daily for 2 days thenTake 2 tabs daily for 2 days then Take 1 tab daily for 2 days then stop    ROSUVASTATIN (CRESTOR) 20 MG TABLET    TAKE 1 TABLET EVERY DAY    TAMSULOSIN (FLOMAX) 0.4 MG CAP        TAMSULOSIN (FLOMAX) 0.4 MG CAP    tamsulosin 0.4 mg capsule    TIOTROPIUM (SPIRIVA WITH HANDIHALER) 18 MCG INHALATION CAPSULE    INHALE 1 CAPSULE VIA HANDIHALER ONCE DAILY AT THE SAME TIME EVERY DAY    TRAMADOL (ULTRAM) 50 MG TABLET        TRAZODONE (DESYREL) 50 MG TABLET    Take 50 mg by mouth every evening. Every day

## 2019-09-04 ENCOUNTER — TELEPHONE (OUTPATIENT)
Dept: FAMILY MEDICINE | Facility: CLINIC | Age: 74
End: 2019-09-04

## 2019-09-04 NOTE — TELEPHONE ENCOUNTER
----- Message from Lou Coto sent at 9/4/2019  1:45 PM CDT -----  Contact: pt  Pt calling wanting to speak to the nurse concerning thyroid ultrasound,seen the results and wants to know if there is going to be any follow up is his question..[phone] (home)

## 2019-09-09 ENCOUNTER — OFFICE VISIT (OUTPATIENT)
Dept: CARDIOLOGY | Facility: CLINIC | Age: 74
End: 2019-09-09
Payer: MEDICARE

## 2019-09-09 ENCOUNTER — TELEPHONE (OUTPATIENT)
Dept: FAMILY MEDICINE | Facility: CLINIC | Age: 74
End: 2019-09-09

## 2019-09-09 VITALS
HEART RATE: 90 BPM | WEIGHT: 151.44 LBS | DIASTOLIC BLOOD PRESSURE: 72 MMHG | HEIGHT: 66 IN | SYSTOLIC BLOOD PRESSURE: 109 MMHG | BODY MASS INDEX: 24.34 KG/M2

## 2019-09-09 DIAGNOSIS — Z98.890 S/P CAROTID ENDARTERECTOMY: ICD-10-CM

## 2019-09-09 DIAGNOSIS — E78.2 MIXED HYPERLIPIDEMIA: ICD-10-CM

## 2019-09-09 DIAGNOSIS — I73.9 PVD (PERIPHERAL VASCULAR DISEASE): ICD-10-CM

## 2019-09-09 DIAGNOSIS — I65.22 STENOSIS OF LEFT CAROTID ARTERY: Primary | ICD-10-CM

## 2019-09-09 DIAGNOSIS — I70.0 ATHEROSCLEROSIS OF AORTA: ICD-10-CM

## 2019-09-09 DIAGNOSIS — I70.212 ATHEROSCLEROSIS OF NATIVE ARTERY OF LEFT LOWER EXTREMITY WITH INTERMITTENT CLAUDICATION: ICD-10-CM

## 2019-09-09 PROCEDURE — 3078F PR MOST RECENT DIASTOLIC BLOOD PRESSURE < 80 MM HG: ICD-10-PCS | Mod: HCNC,CPTII,S$GLB, | Performed by: INTERNAL MEDICINE

## 2019-09-09 PROCEDURE — 3288F PR FALLS RISK ASSESSMENT DOCUMENTED: ICD-10-PCS | Mod: HCNC,CPTII,S$GLB, | Performed by: INTERNAL MEDICINE

## 2019-09-09 PROCEDURE — 99204 OFFICE O/P NEW MOD 45 MIN: CPT | Mod: HCNC,S$GLB,, | Performed by: INTERNAL MEDICINE

## 2019-09-09 PROCEDURE — 99999 PR PBB SHADOW E&M-EST. PATIENT-LVL III: ICD-10-PCS | Mod: PBBFAC,HCNC,, | Performed by: INTERNAL MEDICINE

## 2019-09-09 PROCEDURE — 99204 PR OFFICE/OUTPT VISIT, NEW, LEVL IV, 45-59 MIN: ICD-10-PCS | Mod: HCNC,S$GLB,, | Performed by: INTERNAL MEDICINE

## 2019-09-09 PROCEDURE — 3074F PR MOST RECENT SYSTOLIC BLOOD PRESSURE < 130 MM HG: ICD-10-PCS | Mod: HCNC,CPTII,S$GLB, | Performed by: INTERNAL MEDICINE

## 2019-09-09 PROCEDURE — 1100F PTFALLS ASSESS-DOCD GE2>/YR: CPT | Mod: HCNC,CPTII,S$GLB, | Performed by: INTERNAL MEDICINE

## 2019-09-09 PROCEDURE — 1100F PR PT FALLS ASSESS DOC 2+ FALLS/FALL W/INJURY/YR: ICD-10-PCS | Mod: HCNC,CPTII,S$GLB, | Performed by: INTERNAL MEDICINE

## 2019-09-09 PROCEDURE — 3074F SYST BP LT 130 MM HG: CPT | Mod: HCNC,CPTII,S$GLB, | Performed by: INTERNAL MEDICINE

## 2019-09-09 PROCEDURE — 3288F FALL RISK ASSESSMENT DOCD: CPT | Mod: HCNC,CPTII,S$GLB, | Performed by: INTERNAL MEDICINE

## 2019-09-09 PROCEDURE — 99999 PR PBB SHADOW E&M-EST. PATIENT-LVL III: CPT | Mod: PBBFAC,HCNC,, | Performed by: INTERNAL MEDICINE

## 2019-09-09 PROCEDURE — 3078F DIAST BP <80 MM HG: CPT | Mod: HCNC,CPTII,S$GLB, | Performed by: INTERNAL MEDICINE

## 2019-09-09 NOTE — PROGRESS NOTES
Subjective:    Patient ID:  Castillo Anderson Jr. is a 73 y.o. male who presents for evaluation of abnornal test (ct of lung showed something in valve)      Problem List Items Addressed This Visit        Cardiac/Vascular    Atherosclerosis of aorta    Atherosclerosis of native artery of left lower extremity with intermittent claudication    Mixed hyperlipidemia    PVD (peripheral vascular disease) - bilateral LE stents    S/P carotid endarterectomy    Stenosis of left carotid artery - Primary          HPI    Referred by Dr. Zheng    Abnormal finding on CT of lungs with regard to heart    The patient states that he feels well in general.  Had a stress test 1.5m months ago in [place] that showed no abnormalities per his report.    Had 3 stents in legs  Has history of left carotid  Both were recently imaged in [place] and looked per his report.    Personal history of heart attack or stroke - None  Family history of heart disease - No    Past Medical History:   Diagnosis Date    Cataract     OU    Claudication of lower extremity     Colon polyp     COPD (chronic obstructive pulmonary disease)     Hx of mumps     Hyperlipidemia     Kidney stone     Left carotid stenosis     procedure done to fix    Lumbar disc disorder     PVD (peripheral vascular disease)     Sleep apnea     uses cpap    Tobacco dependence        Past Surgical History:   Procedure Laterality Date    ANGIOGRAM-EXTREMITY-LOWER N/A 5/26/2016    [facility] CATH    CAROTID ENDARTERECTOMY Left 2015    after 6/2015    CAROTID ENDARTERECTOMY Left 06/2016    CATARACT EXTRACTION Bilateral     COLONOSCOPY  03/05/2013    Dr. Morton, repeat in 5 years for surveillance    COLONOSCOPY  2007    in legacy    COLONOSCOPY N/A 5/15/2018    [facility] ENDO    COLONOSCOPY N/A 3/5/2013    [facility] ENDO    COMPUTED TOMOGRAPHY ANGIOGRAM Left 05/26/2016     Leg    [id number]-[id number] Left 6/8/2016    [facility] OR    EPIDURAL BLOCK INJECTION      EYE SURGERY      FEMORAL ARTERY STENT  2010    FEMORAL ARTERY STENT Left 05/26/2016    LAMINECTOMY  2012    left sup fem and prox popliteal stents  9/30/15    x 2    TONSILLECTOMY      UVULECTOMY         Family History   Problem Relation Age of Onset    Cataracts Mother     Alzheimer's disease Mother     Cancer Father         prostate    Cataracts Sister     Amblyopia Neg Hx     Blindness Neg Hx     Diabetes Neg Hx     Glaucoma Neg Hx     Hypertension Neg Hx     Macular degeneration Neg Hx     Retinal detachment Neg Hx     Strabismus Neg Hx     Stroke Neg Hx     Thyroid disease Neg Hx     Colon cancer Neg Hx     Colon polyps Neg Hx     Crohn's disease Neg Hx     Celiac disease Neg Hx     Ulcerative colitis Neg Hx        Social History     Socioeconomic History    Marital status:      Spouse name: Not on file    Number of children: Not on file    Years of education: Not on file    Highest education level: Not on file   Occupational History    Not on file   Social Needs    Financial resource strain: Not on file    Food insecurity:     Worry: Not on file     Inability: Not on file    Transportation needs:     Medical: Not on file     Non-medical: Not on file   Tobacco Use    Smoking status: Current Every Day Smoker     Packs/day: 1.00     Years: 55.00     Pack years: 55.00     Types: Cigarettes    Smokeless tobacco: Never Used    Tobacco comment: vapeing   Substance and Sexual Activity    Alcohol use: Yes     Alcohol/week: 1.2 oz     Types: 2 Cans of beer per week     Comment: per week    Drug use: No    Sexual activity: Not on file   Lifestyle    Physical activity:     Days per week: Not on file     Minutes per session: Not on file    Stress: Not on file   Relationships    Social connections:     Talks on phone: Not on file     Gets  "together: Not on file     Attends Buddhist service: Not on file     Active member of club or organization: Not on file     Attends meetings of clubs or organizations: Not on file     Relationship status: Not on file   Other Topics Concern    Not on file   Social History Narrative    Not on file       Review of patient's allergies indicates:   Allergen Reactions    No known drug allergies        Review of Systems   Constitution: Negative for decreased appetite, fever and malaise/fatigue.   Eyes: Negative for blurred vision.   Cardiovascular: Negative for chest pain, dyspnea on exertion, irregular heartbeat and leg swelling.   Respiratory: Negative for cough, hemoptysis, shortness of breath and wheezing.    Endocrine: Negative for cold intolerance and heat intolerance.   Hematologic/Lymphatic: Negative for bleeding problem.   Musculoskeletal: Negative for muscle weakness and myalgias.   Gastrointestinal: Negative for abdominal pain, constipation and diarrhea.   Genitourinary: Negative for bladder incontinence.   Neurological: Negative for dizziness and weakness.   Psychiatric/Behavioral: Negative for depression.        Objective:     Vitals:    09/09/19 1130   BP: 109/72   BP Location: Right arm   Patient Position: Sitting   BP Method: Medium (Automatic)   Pulse: 90   Weight: 68.7 kg (151 lb 7.3 oz)   Height: 5' 6" (1.676 m)        Physical Exam   Constitutional: He is oriented to person, place, and time. He appears well-developed and well-nourished.   HENT:   Head: Normocephalic and atraumatic.   Neck: Normal range of motion. Neck supple. No JVD present.   Cardiovascular: Normal rate, regular rhythm and normal heart sounds. Exam reveals no gallop and no friction rub.   No murmur heard.  Pulmonary/Chest: Effort normal and breath sounds normal. No respiratory distress. He has no wheezes. He has no rales.   Abdominal: Soft. Bowel sounds are normal. There is no tenderness. There is no rebound and no guarding. "   Musculoskeletal: He exhibits no edema.   Neurological: He is alert and oriented to person, place, and time.   Skin: Skin is warm and dry.   Psychiatric: His behavior is normal.           Current Outpatient Medications on File Prior to Visit   Medication Sig    aspirin 81 MG Chew 81 mg.    clopidogrel (PLAVIX) 75 mg tablet TAKE 1 TABLET EVERY DAY    cyanocobalamin (VITAMIN B-12) 1000 MCG tablet Vitamin B-12  1,000 mcg tablet   Take 1 tablet every day by oral route.    escitalopram oxalate (LEXAPRO) 10 MG tablet escitalopram 10 mg tablet    ezetimibe (ZETIA) 10 mg tablet TAKE 1 TABLET EVERY DAY    nitroGLYCERIN (NITROSTAT) 0.4 MG SL tablet     nitroGLYCERIN (NITROSTAT) 0.4 MG SL tablet nitroglycerin 0.4 mg sublingual tablet    rosuvastatin (CRESTOR) 20 MG tablet TAKE 1 TABLET EVERY DAY    tamsulosin (FLOMAX) 0.4 mg Cap     trazodone (DESYREL) 50 MG tablet Take 50 mg by mouth every evening. Every day    [DISCONTINUED] albuterol (PROVENTIL/VENTOLIN HFA) 90 mcg/actuation inhaler Inhale 2 puffs into the lungs every 6 (six) hours as needed for Wheezing.    [DISCONTINUED] betamethasone dipropionate (DIPROLENE) 0.05 % cream betamethasone dipropionate 0.05 % topical cream    [DISCONTINUED] calcipotriene-betamethasone (TACLONEX) ointment     [DISCONTINUED] cephALEXin (KEFLEX) 500 MG capsule     [DISCONTINUED] difluprednate (DUREZOL) 0.05 % Drop ophthalmic solution Durezol 0.05 % eye drops    [DISCONTINUED] doxycycline (VIBRAMYCIN) 100 MG Cap doxycycline hyclate 100 mg capsule    [DISCONTINUED] duloxetine (CYMBALTA) 60 MG capsule Take 60 mg by mouth once daily.    Instructed to take morning of surgery.    [DISCONTINUED] fluticasone-vilanterol (BREO ELLIPTA) 200-25 mcg/dose DsDv diskus inhaler Inhale 1 puff into the lungs once daily. Controller    [DISCONTINUED] mesalamine (APRISO) 0.375 gram Cp24 Take 4 capsules (1.5 g total) by mouth once daily.    [DISCONTINUED] naproxen (NAPROSYN) 500 MG tablet Take by  mouth daily as needed.    [DISCONTINUED] neomycin-polymyxin-dexamethasone (MAXITROL) 3.5mg/mL-10,000 unit/mL-0.1 % DrpS neomycin-polymyxin-dexameth 3.5 mg/mL-10,000 unit/mL-0.1% eye drops    [DISCONTINUED] nepafenac (ILEVRO) 0.3 % DrpS Ilevro 0.3 % eye drops,suspension    [DISCONTINUED] ofloxacin (OCUFLOX) 0.3 % ophthalmic solution ofloxacin 0.3 % eye drops    [DISCONTINUED] oxyCODONE-acetaminophen (PERCOCET) 5-325 mg per tablet oxycodone-acetaminophen 5 mg-325 mg tablet   Take 1 tablet every 6-8 hours by oral route as needed for 3 days.    [DISCONTINUED] predniSONE (DELTASONE) 10 MG tablet Take 4 tabs daily for 2 days then Take 3 tabs daily for 2 days thenTake 2 tabs daily for 2 days then Take 1 tab daily for 2 days then stop    [DISCONTINUED] tamsulosin (FLOMAX) 0.4 mg Cap tamsulosin 0.4 mg capsule    [DISCONTINUED] tiotropium (SPIRIVA WITH HANDIHALER) 18 mcg inhalation capsule INHALE 1 CAPSULE VIA HANDIHALER ONCE DAILY AT THE SAME TIME EVERY DAY    [DISCONTINUED] traMADol (ULTRAM) 50 mg tablet      No current facility-administered medications on file prior to visit.        Lipid Panel:   Lab Results   Component Value Date    CHOL 126 03/28/2019    HDL 54 03/28/2019    LDLCALC 55.4 (L) 03/28/2019    TRIG 83 03/28/2019    CHOLHDL 42.9 03/28/2019         The ASCVD Risk score (Lisandro DC Jr., et al., 2013) failed to calculate for the following reasons:    The valid total cholesterol range is 130 to 320 mg/dL    All pertinent labs, imaging, and EKGs reviewed.    Assessment:       1. Stenosis of left carotid artery    2. S/P carotid endarterectomy    3. PVD (peripheral vascular disease) - bilateral LE stents    4. Mixed hyperlipidemia    5. Atherosclerosis of native artery of left lower extremity with intermittent claudication    6. Atherosclerosis of aorta         Plan:     Symptoms ok at this time  BP ok  Pulse 90 today  No angina    Takes aspirin, plavix, crestor 20, and zetia  Recent cholesterol levels look  good    Echocardiogram to evaluate valves  Continue Crestor 20mg PO daily  Smoking cessation    Continue other cardiac medications  Mediterranean Diet/Cardiovascular Exercise Program    Patient queried and all questions were answered.    F/u in 1 year to reassess      Signed:    Andrea Salmeron MD  9/9/2019 8:59 AM

## 2019-09-09 NOTE — TELEPHONE ENCOUNTER
----- Message from Ghulam Cespedes sent at 9/9/2019  9:32 AM CDT -----  Contact: Pt  Pt missed a call and would like the nurse to return their call.        Pt can be reached at [phone].

## 2019-09-09 NOTE — LETTER
September 9, 2019      Williams Zheng MD  [address]  [address]  [facility] [address]           [facility]  [address] [facility] [address] [place] [address]  Phone: [phone]          Patient: Castillo Anderson Jr.   MR Number: [id number]   YOB: 1945   Date of Visit: 9/9/2019       Dear Dr. Williams Zheng:    Thank you for referring Castillo Anderson to me for evaluation. Attached you will find relevant portions of my assessment and plan of care.    If you have questions, please do not hesitate to call me. I look forward to following Castillo Anderson along with you.    Sincerely,    Andrea Salmeron MD    Enclosure  CC:  No Recipients    If you would like to receive this communication electronically, please contact [email or web address] or [phone] to request more information on [employer] Link access.    For providers and/or their staff who would like to refer a patient to [facility], please contact us through our one-stop-shop provider referral line, [facility], at [phone].    If you feel you have received this communication in error or would no longer like to receive these types of communications, please e-mail [email or web address]

## 2019-09-12 ENCOUNTER — TELEPHONE (OUTPATIENT)
Dept: FAMILY MEDICINE | Facility: CLINIC | Age: 74
End: 2019-09-12

## 2019-09-12 NOTE — TELEPHONE ENCOUNTER
----- Message from Keiko Pal sent at 9/12/2019 11:54 AM CDT -----  Contact: patient  Patient called to schedule an appointment to have stitches removed and wishes to speak with a nurse regarding this matter.      he  can be reached at [phone]    Thanks  KB

## 2019-09-13 ENCOUNTER — OFFICE VISIT (OUTPATIENT)
Dept: FAMILY MEDICINE | Facility: CLINIC | Age: 74
End: 2019-09-13
Payer: MEDICARE

## 2019-09-13 VITALS
TEMPERATURE: 98 F | OXYGEN SATURATION: 97 % | HEIGHT: 66 IN | WEIGHT: 152.13 LBS | BODY MASS INDEX: 24.45 KG/M2 | HEART RATE: 89 BPM | SYSTOLIC BLOOD PRESSURE: 106 MMHG | DIASTOLIC BLOOD PRESSURE: 70 MMHG

## 2019-09-13 DIAGNOSIS — Z98.890 S/P MOHS SURGERY FOR BASAL CELL CARCINOMA: ICD-10-CM

## 2019-09-13 DIAGNOSIS — L03.90 CELLULITIS, UNSPECIFIED CELLULITIS SITE: Primary | ICD-10-CM

## 2019-09-13 DIAGNOSIS — Z85.828 S/P MOHS SURGERY FOR BASAL CELL CARCINOMA: ICD-10-CM

## 2019-09-13 DIAGNOSIS — Z48.02 ENCOUNTER FOR REMOVAL OF SUTURES: ICD-10-CM

## 2019-09-13 PROCEDURE — 99999 PR PBB SHADOW E&M-EST. PATIENT-LVL IV: CPT | Mod: PBBFAC,HCNC,, | Performed by: NURSE PRACTITIONER

## 2019-09-13 PROCEDURE — 1101F PR PT FALLS ASSESS DOC 0-1 FALLS W/OUT INJ PAST YR: ICD-10-PCS | Mod: HCNC,CPTII,S$GLB, | Performed by: NURSE PRACTITIONER

## 2019-09-13 PROCEDURE — 3074F SYST BP LT 130 MM HG: CPT | Mod: HCNC,CPTII,S$GLB, | Performed by: NURSE PRACTITIONER

## 2019-09-13 PROCEDURE — 3074F PR MOST RECENT SYSTOLIC BLOOD PRESSURE < 130 MM HG: ICD-10-PCS | Mod: HCNC,CPTII,S$GLB, | Performed by: NURSE PRACTITIONER

## 2019-09-13 PROCEDURE — 99999 PR PBB SHADOW E&M-EST. PATIENT-LVL IV: ICD-10-PCS | Mod: PBBFAC,HCNC,, | Performed by: NURSE PRACTITIONER

## 2019-09-13 PROCEDURE — 99214 PR OFFICE/OUTPT VISIT, EST, LEVL IV, 30-39 MIN: ICD-10-PCS | Mod: HCNC,S$GLB,, | Performed by: NURSE PRACTITIONER

## 2019-09-13 PROCEDURE — 3078F DIAST BP <80 MM HG: CPT | Mod: HCNC,CPTII,S$GLB, | Performed by: NURSE PRACTITIONER

## 2019-09-13 PROCEDURE — 1101F PT FALLS ASSESS-DOCD LE1/YR: CPT | Mod: HCNC,CPTII,S$GLB, | Performed by: NURSE PRACTITIONER

## 2019-09-13 PROCEDURE — 3078F PR MOST RECENT DIASTOLIC BLOOD PRESSURE < 80 MM HG: ICD-10-PCS | Mod: HCNC,CPTII,S$GLB, | Performed by: NURSE PRACTITIONER

## 2019-09-13 PROCEDURE — 99214 OFFICE O/P EST MOD 30 MIN: CPT | Mod: HCNC,S$GLB,, | Performed by: NURSE PRACTITIONER

## 2019-09-13 RX ORDER — SULFAMETHOXAZOLE AND TRIMETHOPRIM 800; 160 MG/1; MG/1
1 TABLET ORAL 2 TIMES DAILY
Qty: 14 TABLET | Refills: 0 | Status: SHIPPED | OUTPATIENT
Start: 2019-09-13 | End: 2019-09-20

## 2019-09-13 NOTE — PROGRESS NOTES
Subjective:       Patient ID: Castillo Anderson Jr. is a 73 y.o. male.    Chief Complaint: incision not closing (dehissed incision, had procedure 3 weeks ago)    HPI   Mr. Anderson is a new patient to me. He presents today for suture removal. S/p Mohs to RLE 3 weeks 2 days ago (derm in [place]--lives there part time). Was told to have sutures removed at 3 weeks. Reports tenderness, surrounding erythema     Vitals:    09/13/19 0950   BP: 106/70   Pulse: 89   Temp: 98.4 °F (36.9 °C)     Review of Systems   Constitutional: Negative for diaphoresis and fever.   HENT: Negative for facial swelling and trouble swallowing.    Eyes: Negative for discharge and redness.   Respiratory: Negative for cough and shortness of breath.    Cardiovascular: Negative for chest pain and palpitations.   Gastrointestinal: Negative for vomiting.   Genitourinary: Negative for difficulty urinating.   Musculoskeletal: Negative for gait problem.   Skin: Positive for color change. Negative for pallor and rash.   Neurological: Negative for facial asymmetry and speech difficulty.   Psychiatric/Behavioral: Negative for confusion. The patient is not nervous/anxious.        Past Medical History:   Diagnosis Date    Cataract     OU    Claudication of lower extremity     Colon polyp     COPD (chronic obstructive pulmonary disease)     Hx of mumps     Hyperlipidemia     Kidney stone     Left carotid stenosis     procedure done to fix    Lumbar disc disorder     PVD (peripheral vascular disease)     Sleep apnea     uses cpap    Tobacco dependence      Objective:      Physical Exam   Constitutional: He is oriented to person, place, and time. He does not have a sickly appearance. No distress.   HENT:   Head: Normocephalic.   Right Ear: Hearing normal.   Left Ear: Hearing normal.   Nose: Nose normal.   Eyes: Conjunctivae and lids are normal.   Neck: No JVD present. No tracheal deviation present.   Cardiovascular: Normal rate and normal heart sounds.    Pulmonary/Chest: Effort normal and breath sounds normal.   Abdominal: Normal appearance. He exhibits no distension.   Musculoskeletal: He exhibits no deformity.   Neurological: He is alert and oriented to person, place, and time.   Skin: He is not diaphoretic. No pallor.        Psychiatric: He has a normal mood and affect. His speech is normal and behavior is normal. Judgment and thought content normal. Cognition and memory are normal.   Nursing note and vitals reviewed.    Suture Removal  Date/Time: 9/13/2019 10:00 AM  Performed by: Shameka Bell NP  Authorized by: Shameka Bell NP   Body area: lower extremity  Location details: right lower leg  Wound Appearance: clean, erythematous, tender, draining and nonpurulent  Sutures Removed: 12  Post-removal: antibiotic ointment applied and dressing applied  Complications: No  Patient tolerance: Patient tolerated the procedure well with no immediate complications          Assessment:       1. Cellulitis, unspecified cellulitis site    2. S/P Mohs surgery for basal cell carcinoma    3. Encounter for removal of sutures        Plan:       Cellulitis, unspecified cellulitis site  -     sulfamethoxazole-trimethoprim 800-160mg (BACTRIM DS) 800-160 mg Tab; Take 1 tablet by mouth 2 (two) times daily. for 7 days  Dispense: 14 tablet; Refill: 0  -     Ambulatory referral to Dermatology    S/P Mohs surgery for basal cell carcinoma  -     Ambulatory referral to Dermatology        educated on wound care, safety, return precautions  Follow up for dermatology evaluation next week, sooner as needed.    Medication List with Changes/Refills   New Medications    SULFAMETHOXAZOLE-TRIMETHOPRIM 800-160MG (BACTRIM DS) 800-160 MG TAB    Take 1 tablet by mouth 2 (two) times daily. for 7 days   Current Medications    ASPIRIN 81 MG CHEW    81 mg.    CLOPIDOGREL (PLAVIX) 75 MG TABLET    TAKE 1 TABLET EVERY DAY    CYANOCOBALAMIN (VITAMIN B-12) 1000 MCG TABLET    Vitamin B-12  1,000  mcg tablet   Take 1 tablet every day by oral route.    ESCITALOPRAM OXALATE (LEXAPRO) 10 MG TABLET    escitalopram 10 mg tablet    EZETIMIBE (ZETIA) 10 MG TABLET    TAKE 1 TABLET EVERY DAY    NITROGLYCERIN (NITROSTAT) 0.4 MG SL TABLET        NITROGLYCERIN (NITROSTAT) 0.4 MG SL TABLET    nitroglycerin 0.4 mg sublingual tablet    ROSUVASTATIN (CRESTOR) 20 MG TABLET    TAKE 1 TABLET EVERY DAY    TAMSULOSIN (FLOMAX) 0.4 MG CAP        TRAZODONE (DESYREL) 50 MG TABLET    Take 50 mg by mouth every evening. Every day

## 2019-09-18 ENCOUNTER — CLINICAL SUPPORT (OUTPATIENT)
Dept: CARDIOLOGY | Facility: CLINIC | Age: 74
End: 2019-09-18
Attending: INTERNAL MEDICINE
Payer: MEDICARE

## 2019-09-18 ENCOUNTER — INITIAL CONSULT (OUTPATIENT)
Dept: DERMATOLOGY | Facility: CLINIC | Age: 74
End: 2019-09-18
Payer: MEDICARE

## 2019-09-18 VITALS — WEIGHT: 152 LBS | HEIGHT: 66 IN | BODY MASS INDEX: 24.43 KG/M2

## 2019-09-18 VITALS
WEIGHT: 152 LBS | SYSTOLIC BLOOD PRESSURE: 106 MMHG | HEIGHT: 66 IN | DIASTOLIC BLOOD PRESSURE: 60 MMHG | BODY MASS INDEX: 24.43 KG/M2

## 2019-09-18 DIAGNOSIS — I70.0 ATHEROSCLEROSIS OF AORTA: ICD-10-CM

## 2019-09-18 DIAGNOSIS — I73.9 PVD (PERIPHERAL VASCULAR DISEASE): ICD-10-CM

## 2019-09-18 DIAGNOSIS — L90.5 SCAR: ICD-10-CM

## 2019-09-18 DIAGNOSIS — Z85.828 HISTORY OF NONMELANOMA SKIN CANCER: ICD-10-CM

## 2019-09-18 DIAGNOSIS — D69.2 SENILE PURPURA: ICD-10-CM

## 2019-09-18 DIAGNOSIS — L82.0 INFLAMED SEBORRHEIC KERATOSIS: Primary | ICD-10-CM

## 2019-09-18 LAB
ASCENDING AORTA: 2.63 CM
AV INDEX (PROSTH): 0.66
AV MEAN GRADIENT: 4 MMHG
AV PEAK GRADIENT: 9 MMHG
AV VALVE AREA: 2.3 CM2
AV VELOCITY RATIO: 0.66
BSA FOR ECHO PROCEDURE: 1.79 M2
CV ECHO LV RWT: 0.54 CM
DOP CALC AO PEAK VEL: 1.46 M/S
DOP CALC AO VTI: 30.69 CM
DOP CALC LVOT AREA: 3.5 CM2
DOP CALC LVOT DIAMETER: 2.1 CM
DOP CALC LVOT PEAK VEL: 0.96 M/S
DOP CALC LVOT STROKE VOLUME: 70.62 CM3
DOP CALCLVOT PEAK VEL VTI: 20.4 CM
E WAVE DECELERATION TIME: 212.19 MSEC
E/A RATIO: 0.88
E/E' RATIO: 7.88 M/S
ECHO LV POSTERIOR WALL: 1.07 CM (ref 0.6–1.1)
FRACTIONAL SHORTENING: 35 % (ref 28–44)
INTERVENTRICULAR SEPTUM: 1.06 CM (ref 0.6–1.1)
IVRT: 0.11 MSEC
LA MAJOR: 3.56 CM
LA MINOR: 4.73 CM
LA WIDTH: 3.32 CM
LEFT ATRIUM SIZE: 3.52 CM
LEFT ATRIUM VOLUME INDEX: 22.7 ML/M2
LEFT ATRIUM VOLUME: 40.35 CM3
LEFT INTERNAL DIMENSION IN SYSTOLE: 2.57 CM (ref 2.1–4)
LEFT VENTRICLE DIASTOLIC VOLUME INDEX: 38.6 ML/M2
LEFT VENTRICLE DIASTOLIC VOLUME: 68.71 ML
LEFT VENTRICLE MASS INDEX: 77 G/M2
LEFT VENTRICLE SYSTOLIC VOLUME INDEX: 13.4 ML/M2
LEFT VENTRICLE SYSTOLIC VOLUME: 23.93 ML
LEFT VENTRICULAR INTERNAL DIMENSION IN DIASTOLE: 3.97 CM (ref 3.5–6)
LEFT VENTRICULAR MASS: 137.39 G
LV LATERAL E/E' RATIO: 7.88 M/S
LV SEPTAL E/E' RATIO: 7.88 M/S
MV PEAK A VEL: 0.72 M/S
MV PEAK E VEL: 0.63 M/S
PISA TR MAX VEL: 2.34 M/S
PULM VEIN S/D RATIO: 1.26
PV PEAK D VEL: 0.42 M/S
PV PEAK S VEL: 0.53 M/S
RA MAJOR: 3.94 CM
RA PRESSURE: 3 MMHG
RA WIDTH: 2.63 CM
RIGHT VENTRICULAR END-DIASTOLIC DIMENSION: 3.21 CM
RV TISSUE DOPPLER FREE WALL SYSTOLIC VELOCITY 1 (APICAL 4 CHAMBER VIEW): 12.97 CM/S
SINUS: 3.11 CM
STJ: 2.2 CM
TDI LATERAL: 0.08 M/S
TDI SEPTAL: 0.08 M/S
TDI: 0.08 M/S
TR MAX PG: 22 MMHG
TRICUSPID ANNULAR PLANE SYSTOLIC EXCURSION: 1.88 CM
TV REST PULMONARY ARTERY PRESSURE: 25 MMHG

## 2019-09-18 PROCEDURE — 99213 PR OFFICE/OUTPT VISIT, EST, LEVL III, 20-29 MIN: ICD-10-PCS | Mod: 25,HCNC,S$GLB, | Performed by: DERMATOLOGY

## 2019-09-18 PROCEDURE — 99999 PR PBB SHADOW E&M-EST. PATIENT-LVL II: ICD-10-PCS | Mod: PBBFAC,HCNC,, | Performed by: DERMATOLOGY

## 2019-09-18 PROCEDURE — 3078F DIAST BP <80 MM HG: CPT | Mod: HCNC,CPTII,S$GLB, | Performed by: DERMATOLOGY

## 2019-09-18 PROCEDURE — 1101F PR PT FALLS ASSESS DOC 0-1 FALLS W/OUT INJ PAST YR: ICD-10-PCS | Mod: HCNC,CPTII,S$GLB, | Performed by: DERMATOLOGY

## 2019-09-18 PROCEDURE — 3074F PR MOST RECENT SYSTOLIC BLOOD PRESSURE < 130 MM HG: ICD-10-PCS | Mod: HCNC,CPTII,S$GLB, | Performed by: DERMATOLOGY

## 2019-09-18 PROCEDURE — 99999 PR PBB SHADOW E&M-EST. PATIENT-LVL II: CPT | Mod: PBBFAC,HCNC,,

## 2019-09-18 PROCEDURE — 99499 RISK ADDL DX/OHS AUDIT: ICD-10-PCS | Mod: S$GLB,,, | Performed by: DERMATOLOGY

## 2019-09-18 PROCEDURE — 17110 DESTRUCTION B9 LES UP TO 14: CPT | Mod: HCNC,S$GLB,, | Performed by: DERMATOLOGY

## 2019-09-18 PROCEDURE — 3078F PR MOST RECENT DIASTOLIC BLOOD PRESSURE < 80 MM HG: ICD-10-PCS | Mod: HCNC,CPTII,S$GLB, | Performed by: DERMATOLOGY

## 2019-09-18 PROCEDURE — 3074F SYST BP LT 130 MM HG: CPT | Mod: HCNC,CPTII,S$GLB, | Performed by: DERMATOLOGY

## 2019-09-18 PROCEDURE — 99999 PR PBB SHADOW E&M-EST. PATIENT-LVL II: ICD-10-PCS | Mod: PBBFAC,HCNC,,

## 2019-09-18 PROCEDURE — 93306 TTE W/DOPPLER COMPLETE: CPT | Mod: HCNC,S$GLB,, | Performed by: INTERNAL MEDICINE

## 2019-09-18 PROCEDURE — 99213 OFFICE O/P EST LOW 20 MIN: CPT | Mod: 25,HCNC,S$GLB, | Performed by: DERMATOLOGY

## 2019-09-18 PROCEDURE — 93306 ECHO (CUPID ONLY): ICD-10-PCS | Mod: HCNC,S$GLB,, | Performed by: INTERNAL MEDICINE

## 2019-09-18 PROCEDURE — 99999 PR PBB SHADOW E&M-EST. PATIENT-LVL II: CPT | Mod: PBBFAC,HCNC,, | Performed by: DERMATOLOGY

## 2019-09-18 PROCEDURE — 1101F PT FALLS ASSESS-DOCD LE1/YR: CPT | Mod: HCNC,CPTII,S$GLB, | Performed by: DERMATOLOGY

## 2019-09-18 PROCEDURE — 17110 PR DESTRUCTION BENIGN LESIONS UP TO 14: ICD-10-PCS | Mod: HCNC,S$GLB,, | Performed by: DERMATOLOGY

## 2019-09-18 PROCEDURE — 99499 UNLISTED E&M SERVICE: CPT | Mod: S$GLB,,, | Performed by: DERMATOLOGY

## 2019-09-18 NOTE — PROGRESS NOTES
Subjective:       Patient ID:  Castillo Anderson Jr. is a 73 y.o. male who presents for   Chief Complaint   Patient presents with    Lesion     lower leg    Lesion     under eye lid     LOV: 10/03/2016 Dr. Streeter at which time Bx done Right lateral leg and right lower abd consistent with LSC/prurigo nodules .    Patient states had a skin cancer treated with Mohs surgery on right lower leg in mid August 2019 in [place] , states somewhat healing, would like to have this area checked. Saw PCP recently and treated with ABx for possible infection - still taking Bactrim (started 9/13/19)     He also complains of a lesion under left eyelid x years, states increasing in size, darker in color, itchy, No tx.    Lifetime of high sun exposure   + NMSC on right lower leg August 2019 in [place]  No fhx skin ca    Past Medical History:  No date: Cataract      Comment:  OU  No date: Claudication of lower extremity  No date: Colon polyp  No date: COPD (chronic obstructive pulmonary disease)  No date: Hx of mumps  No date: Hyperlipidemia  No date: Kidney stone  No date: Left carotid stenosis      Comment:  procedure done to fix  No date: Lumbar disc disorder  No date: PVD (peripheral vascular disease)  No date: Sleep apnea      Comment:  uses cpap  No date: Tobacco dependence              Review of Systems   Constitutional: Negative for fever, chills and fatigue.   Skin: Positive for itching and dry skin. Negative for activity-related sunscreen use, sensitivity to antibiotic ointment, sensitivity to bandage adhesive and tendency to form keloidal scars.   Hematologic/Lymphatic: Bruises/bleeds easily.        Objective:    Physical Exam   Constitutional: He appears well-developed and well-nourished. No distress.   Neurological: He is alert and oriented to person, place, and time. He is not disoriented.   Psychiatric: He has a normal mood and affect.   Skin:   Areas Examined (abnormalities noted in diagram):   Head / Face Inspection  Performed  Neck Inspection Performed  Chest / Axilla Inspection Performed  RUE Inspected  LUE Inspection Performed  RLE Inspected                   Diagram Legend     Erythematous scaling macule/papule c/w actinic keratosis       Vascular papule c/w angioma      Pigmented verrucoid papule/plaque c/w seborrheic keratosis      Yellow umbilicated papule c/w sebaceous hyperplasia      Irregularly shaped tan macule c/w lentigo     1-2 mm smooth white papules consistent with Milia      Movable subcutaneous cyst with punctum c/w epidermal inclusion cyst      Subcutaneous movable cyst c/w pilar cyst      Firm pink to brown papule c/w dermatofibroma      Pedunculated fleshy papule(s) c/w skin tag(s)      Evenly pigmented macule c/w junctional nevus     Mildly variegated pigmented, slightly irregular-bordered macule c/w mildly atypical nevus      Flesh colored to evenly pigmented papule c/w intradermal nevus       Pink pearly papule/plaque c/w basal cell carcinoma      Erythematous hyperkeratotic cursted plaque c/w SCC      Surgical scar with no sign of skin cancer recurrence      Open and closed comedones      Inflammatory papules and pustules      Verrucoid papule consistent consistent with wart     Erythematous eczematous patches and plaques     Dystrophic onycholytic nail with subungual debris c/w onychomycosis     Umbilicated papule    Erythematous-base heme-crusted tan verrucoid plaque consistent with inflamed seborrheic keratosis     Erythematous Silvery Scaling Plaque c/w Psoriasis     See annotation      Assessment / Plan:        Inflamed seborrheic keratosis  Procedure note for destruction via hyfrecation and curettage:    Verbal consent obtained. Risks of recurrence and scarring discussed.   1 lesions cleaned with alcohol and anesthetized with 1% lidocaine with epinephrine. Areas then lightly hyfrecated and curetted to remove gross lesion. Hemostasis achieved with aluminum chloride application. No complications.    Areas dressed with Vaseline jelly and bandage. Wound care discussed.    Pt tolerated well      Scar s/p Mohs on right lower leg  - NO signs of infection today but would recommend completing course of Bactrim  - Recommend vaseline to area      History of nonmelanoma skin cancer  Area(s) of previous NMSC evaluated with no signs of recurrence.    Senile purpura  Reassurance, benign clinical entity resulting from sun-induced damage to the connective tissue of the dermis. No treatment necessary  Diligent sun precautions emphasized                         Follow up in about 3 months (around 12/18/2019) for skin check.

## 2019-09-18 NOTE — LETTER
September 18, 2019      Shameka Bell, PRIYANKA  [address] [facility] [address] [place] [address]           [facility]  [address] [facility] [address] [place] [address]  Phone: [phone]  Fax: [phone]          Patient: Castillo Anderson Jr.   MR Number: [id number]   YOB: 1945   Date of Visit: 9/18/2019       Dear Shameka Bell:    Thank you for referring Castillo Anderson to me for evaluation. Attached you will find relevant portions of my assessment and plan of care.    If you have questions, please do not hesitate to call me. I look forward to following Castillo Anderson along with you.    Sincerely,    Falguni Lawton MD    Enclosure  CC:  No Recipients    If you would like to receive this communication electronically, please contact [email or web address] or [phone] to request more information on [employer] access.    For providers and/or their staff who would like to refer a patient to [facility], please contact us through our one-stop-shop provider referral line, [facility], at [phone].    If you feel you have received this communication in error or would no longer like to receive these types of communications, please e-mail [email or web address]

## 2019-09-19 ENCOUNTER — TELEPHONE (OUTPATIENT)
Dept: CARDIOLOGY | Facility: CLINIC | Age: 74
End: 2019-09-19

## 2019-09-19 NOTE — TELEPHONE ENCOUNTER
Spoke to patient, informed him of echo results per Dr. Salmeron noted in results. Patient verbalized understanding.

## 2019-09-19 NOTE — TELEPHONE ENCOUNTER
----- Message from Yanira Champion sent at 9/19/2019 10:40 AM CDT -----  Type:  Patient Returning Call    Who Called:  Patient   Who Left Message for Patient: unknown ( not noted in epic )   Does the patient know what this is regarding?: unknown   Best Call Back Number:  [phone]  Additional Information:  Returned call

## 2019-10-08 ENCOUNTER — OFFICE VISIT (OUTPATIENT)
Dept: FAMILY MEDICINE | Facility: CLINIC | Age: 74
End: 2019-10-08
Payer: MEDICARE

## 2019-10-08 ENCOUNTER — HOSPITAL ENCOUNTER (OUTPATIENT)
Dept: RADIOLOGY | Facility: HOSPITAL | Age: 74
Discharge: HOME | End: 2019-10-08
Attending: NURSE PRACTITIONER
Payer: MEDICARE

## 2019-10-08 VITALS
WEIGHT: 154.75 LBS | HEIGHT: 66 IN | OXYGEN SATURATION: 99 % | DIASTOLIC BLOOD PRESSURE: 70 MMHG | TEMPERATURE: 98 F | BODY MASS INDEX: 24.87 KG/M2 | SYSTOLIC BLOOD PRESSURE: 138 MMHG | HEART RATE: 76 BPM

## 2019-10-08 DIAGNOSIS — R22.2 LOCALIZED SWELLING OF CHEST WALL: ICD-10-CM

## 2019-10-08 DIAGNOSIS — R22.2 LOCALIZED SWELLING OF CHEST WALL: Primary | ICD-10-CM

## 2019-10-08 PROCEDURE — 71046 XR CHEST PA AND LATERAL: ICD-10-PCS | Mod: 26,HCNC,, | Performed by: RADIOLOGY

## 2019-10-08 PROCEDURE — 76604 US SOFT TISSUE CHEST_UPPER BACK: ICD-10-PCS | Mod: 26,HCNC,, | Performed by: RADIOLOGY

## 2019-10-08 PROCEDURE — 3078F PR MOST RECENT DIASTOLIC BLOOD PRESSURE < 80 MM HG: ICD-10-PCS | Mod: HCNC,CPTII,S$GLB, | Performed by: NURSE PRACTITIONER

## 2019-10-08 PROCEDURE — 3075F PR MOST RECENT SYSTOLIC BLOOD PRESS GE 130-139MM HG: ICD-10-PCS | Mod: HCNC,CPTII,S$GLB, | Performed by: NURSE PRACTITIONER

## 2019-10-08 PROCEDURE — 76604 US EXAM CHEST: CPT | Mod: TC,HCNC,PO

## 2019-10-08 PROCEDURE — 1101F PT FALLS ASSESS-DOCD LE1/YR: CPT | Mod: HCNC,CPTII,S$GLB, | Performed by: NURSE PRACTITIONER

## 2019-10-08 PROCEDURE — 99999 PR PBB SHADOW E&M-EST. PATIENT-LVL IV: ICD-10-PCS | Mod: PBBFAC,HCNC,, | Performed by: NURSE PRACTITIONER

## 2019-10-08 PROCEDURE — 3075F SYST BP GE 130 - 139MM HG: CPT | Mod: HCNC,CPTII,S$GLB, | Performed by: NURSE PRACTITIONER

## 2019-10-08 PROCEDURE — 71046 X-RAY EXAM CHEST 2 VIEWS: CPT | Mod: TC,HCNC,FY,PO

## 2019-10-08 PROCEDURE — 76604 US EXAM CHEST: CPT | Mod: 26,HCNC,, | Performed by: RADIOLOGY

## 2019-10-08 PROCEDURE — 1101F PR PT FALLS ASSESS DOC 0-1 FALLS W/OUT INJ PAST YR: ICD-10-PCS | Mod: HCNC,CPTII,S$GLB, | Performed by: NURSE PRACTITIONER

## 2019-10-08 PROCEDURE — 99999 PR PBB SHADOW E&M-EST. PATIENT-LVL IV: CPT | Mod: PBBFAC,HCNC,, | Performed by: NURSE PRACTITIONER

## 2019-10-08 PROCEDURE — 3078F DIAST BP <80 MM HG: CPT | Mod: HCNC,CPTII,S$GLB, | Performed by: NURSE PRACTITIONER

## 2019-10-08 PROCEDURE — 71046 X-RAY EXAM CHEST 2 VIEWS: CPT | Mod: 26,HCNC,, | Performed by: RADIOLOGY

## 2019-10-08 PROCEDURE — 99214 PR OFFICE/OUTPT VISIT, EST, LEVL IV, 30-39 MIN: ICD-10-PCS | Mod: HCNC,S$GLB,, | Performed by: NURSE PRACTITIONER

## 2019-10-08 PROCEDURE — 99214 OFFICE O/P EST MOD 30 MIN: CPT | Mod: HCNC,S$GLB,, | Performed by: NURSE PRACTITIONER

## 2019-10-08 NOTE — PROGRESS NOTES
Subjective:       Patient ID: Castillo Anderson Jr. is a 73 y.o. male.    Chief Complaint: fluid filled sac (left side of back, non painful)    HPI   Mr. Anderson is a known patient to me. He presents today for localized swelling to left lateral chest wall. Noticed ~1 week ago. Unchanged. Nontender, without discoloration. Denies cough/sob/wheezing. Denies injury/trauma   Vitals:    10/08/19 0830   BP: 138/70   Pulse: 76   Temp: 97.9 °F (36.6 °C)     Review of Systems   Constitutional: Negative for diaphoresis and fever.   HENT: Negative for facial swelling and trouble swallowing.    Eyes: Negative for discharge and redness.   Respiratory: Negative for cough, chest tightness, shortness of breath and wheezing.    Cardiovascular: Negative for chest pain and palpitations.   Gastrointestinal: Negative for constipation, diarrhea, nausea and vomiting.   Genitourinary: Negative for difficulty urinating.   Musculoskeletal: Negative for gait problem.        Left lateral chest wall swelling   Skin: Negative for color change, rash and wound.   Neurological: Negative for facial asymmetry and speech difficulty.   Psychiatric/Behavioral: Negative for confusion. The patient is not nervous/anxious.        Past Medical History:   Diagnosis Date    Cataract     OU    Claudication of lower extremity     Colon polyp     COPD (chronic obstructive pulmonary disease)     Hx of mumps     Hyperlipidemia     Kidney stone     Left carotid stenosis     procedure done to fix    Lumbar disc disorder     PVD (peripheral vascular disease)     Sleep apnea     uses cpap    Tobacco dependence      Objective:      Physical Exam   Constitutional: He is oriented to person, place, and time. He does not have a sickly appearance. No distress.   HENT:   Head: Normocephalic.   Right Ear: Hearing normal.   Left Ear: Hearing normal.   Nose: Nose normal.   Eyes: Conjunctivae and lids are normal.   Neck: No JVD present. No tracheal deviation present.    Cardiovascular: Normal rate and normal heart sounds.   Pulmonary/Chest: Effort normal and breath sounds normal. He exhibits swelling. He exhibits no tenderness, no crepitus, no edema and no retraction.       Abdominal: Normal appearance. He exhibits no distension. There is no tenderness.   Musculoskeletal: He exhibits no deformity.   Neurological: He is alert and oriented to person, place, and time.   Skin: He is not diaphoretic. No pallor.   Psychiatric: He has a normal mood and affect. His speech is normal and behavior is normal. Judgment and thought content normal. Cognition and memory are normal.   Nursing note and vitals reviewed.      Assessment:       1. Localized swelling of chest wall        Plan:       Localized swelling of chest wall  -     X-Ray Chest PA And Lateral; Future; Expected date: 10/08/2019  -      Soft Tissue Misc; Future; Expected date: 10/08/2019    educated on safety/return precautions  Follow up for further evaluation if s/s worsen, fail to improve, or new symptoms arise.    Medication List with Changes/Refills   Current Medications    ASPIRIN 81 MG CHEW    81 mg.    CLOPIDOGREL (PLAVIX) 75 MG TABLET    TAKE 1 TABLET EVERY DAY    CYANOCOBALAMIN (VITAMIN B-12) 1000 MCG TABLET    Vitamin B-12  1,000 mcg tablet   Take 1 tablet every day by oral route.    ESCITALOPRAM OXALATE (LEXAPRO) 10 MG TABLET    escitalopram 10 mg tablet    EZETIMIBE (ZETIA) 10 MG TABLET    TAKE 1 TABLET EVERY DAY    NITROGLYCERIN (NITROSTAT) 0.4 MG SL TABLET        NITROGLYCERIN (NITROSTAT) 0.4 MG SL TABLET    nitroglycerin 0.4 mg sublingual tablet    ROSUVASTATIN (CRESTOR) 20 MG TABLET    TAKE 1 TABLET EVERY DAY    TAMSULOSIN (FLOMAX) 0.4 MG CAP        TRAZODONE (DESYREL) 50 MG TABLET    Take 50 mg by mouth every evening. Every day

## 2019-10-09 ENCOUNTER — PES CALL (OUTPATIENT)
Dept: ADMINISTRATIVE | Facility: CLINIC | Age: 74
End: 2019-10-09

## 2019-10-31 NOTE — PROGRESS NOTES
Requested Prescriptions   Pending Prescriptions Disp Refills    rosuvastatin (CRESTOR) 20 MG tablet [Pharmacy Med Name: ROSUVASTATIN CALCIUM 20 MG Tablet] 90 tablet 1     Sig: TAKE 1 TABLET EVERY DAY       Cardiovascular:  Antilipid - Statins Passed - 10/31/2019  2:52 PM        Passed - Patient is at least 18 years old        Passed - Valid encounter within last 12 months     Recent Outpatient Visits            3 weeks ago Localized swelling of chest wall    [facility] Shameka Bell NP    1 month ago Cellulitis, unspecified cellulitis site    [facility] Shameka Bell NP    1 month ago Stenosis of left carotid artery    [facility] Andrea Salmeron MD    2 months ago Thyroid nodule    [facility] Frantz Lemos MD    7 months ago Routine medical exam    [facility] Frantz Lemos MD          Future Appointments              In 5 days [facility] CT1 LIMIT 500 LBS [facility]                Passed - Lipid Panel completed in last 360 days     Lab Results   Component Value Date    CHOL 126 03/28/2019    HDL 54 03/28/2019    LDLCALC 55.4 (L) 03/28/2019    TRIG 83 03/28/2019             Passed - ALT is 94 or below and within 360 days     ALT   Date Value Ref Range Status   03/28/2019 18 10 - 44 U/L Final   03/20/2018 20 10 - 44 U/L Final   01/11/2017 26 10 - 44 U/L Final              Passed - AST is 54 or below and within 360 days     AST   Date Value Ref Range Status   03/28/2019 19 10 - 40 U/L Final   03/20/2018 18 10 - 40 U/L Final   01/11/2017 22 10 - 40 U/L Final

## 2019-11-01 RX ORDER — ROSUVASTATIN CALCIUM 20 MG/1
TABLET, COATED ORAL
Qty: 90 TABLET | Refills: 1 | Status: SHIPPED | OUTPATIENT
Start: 2019-11-01 | End: 2019-11-01

## 2019-11-01 RX ORDER — ROSUVASTATIN CALCIUM 20 MG/1
20 TABLET, COATED ORAL DAILY
Qty: 90 TABLET | Refills: 1 | Status: SHIPPED | OUTPATIENT
Start: 2019-11-01 | End: 2020-03-23

## 2019-11-01 NOTE — PROGRESS NOTES
Refill Authorization Note     is requesting a refill authorization.    Brief assessment and rationale for refill: APPROVE: prr  Name and strength of medication: rosuvastatin 20 mg       How patient will take medication: utd          Comments: Revised Refill Auth Note. Ordered under Dr. Banks in error. Reordered under PCP.    Requested Prescriptions   Pending Prescriptions Disp Refills    rosuvastatin (CRESTOR) 20 MG tablet [Pharmacy Med Name: ROSUVASTATIN CALCIUM 20 MG Tablet] 90 tablet 1     Sig: TAKE 1 TABLET EVERY DAY       Cardiovascular:  Antilipid - Statins Passed - 10/31/2019  2:56 PM        Passed - Patient is at least 18 years old        Passed - Valid encounter within last 12 months     Recent Outpatient Visits            3 weeks ago Localized swelling of chest wall    [facility] Shameka Bell NP    1 month ago Cellulitis, unspecified cellulitis site    [facility] Shameka Bell NP    1 month ago Stenosis of left carotid artery    [facility] Andrea Salmeron MD    2 months ago Thyroid nodule    [facility] Frantz Lemos MD    7 months ago Routine medical exam    [facility] Frantz Lemos MD          Future Appointments              In 4 days [facility] CT1 LIMIT 500 LBS [facility]                Passed - Lipid Panel completed in last 360 days     Lab Results   Component Value Date    CHOL 126 03/28/2019    HDL 54 03/28/2019    LDLCALC 55.4 (L) 03/28/2019    TRIG 83 03/28/2019             Passed - ALT is 94 or below and within 360 days     ALT   Date Value Ref Range Status   03/28/2019 18 10 - 44 U/L Final   03/20/2018 20 10 - 44 U/L Final   01/11/2017 26 10 - 44 U/L Final              Passed - AST is 54 or below and within 360 days     AST   Date Value Ref Range Status   03/28/2019 19 10 - 40 U/L Final   03/20/2018 18 10 - 40 U/L Final   01/11/2017 22 10 - 40 U/L  Final

## 2019-11-05 ENCOUNTER — HOSPITAL ENCOUNTER (OUTPATIENT)
Dept: RADIOLOGY | Facility: HOSPITAL | Age: 74
Discharge: HOME | End: 2019-11-05
Attending: INTERNAL MEDICINE
Payer: MEDICARE

## 2019-11-05 DIAGNOSIS — I25.10 DISEASE OF CARDIOVASCULAR SYSTEM: ICD-10-CM

## 2019-11-05 DIAGNOSIS — R91.8 LUNG MASS: ICD-10-CM

## 2019-11-05 PROCEDURE — 71250 CT CHEST WITHOUT CONTRAST: ICD-10-PCS | Mod: 26,HCNC,, | Performed by: RADIOLOGY

## 2019-11-05 PROCEDURE — 71250 CT THORAX DX C-: CPT | Mod: TC,HCNC,PO

## 2019-11-05 PROCEDURE — 71250 CT THORAX DX C-: CPT | Mod: 26,HCNC,, | Performed by: RADIOLOGY

## 2019-11-06 ENCOUNTER — OFFICE VISIT (OUTPATIENT)
Dept: DERMATOLOGY | Facility: CLINIC | Age: 74
End: 2019-11-06
Payer: MEDICARE

## 2019-11-06 VITALS — WEIGHT: 154.75 LBS | RESPIRATION RATE: 18 BRPM | HEIGHT: 66 IN | BODY MASS INDEX: 24.87 KG/M2

## 2019-11-06 DIAGNOSIS — D48.9 NEOPLASM OF UNCERTAIN BEHAVIOR: Primary | ICD-10-CM

## 2019-11-06 DIAGNOSIS — Z85.828 HISTORY OF NONMELANOMA SKIN CANCER: ICD-10-CM

## 2019-11-06 DIAGNOSIS — L57.0 AK (ACTINIC KERATOSIS): ICD-10-CM

## 2019-11-06 PROCEDURE — 11103 PR TANGENTIAL BIOPSY, SKIN, EA ADDTL LESION: ICD-10-PCS | Mod: HCNC,S$GLB,, | Performed by: DERMATOLOGY

## 2019-11-06 PROCEDURE — 88305 TISSUE EXAM BY PATHOLOGIST: CPT | Mod: 26,HCNC,, | Performed by: PATHOLOGY

## 2019-11-06 PROCEDURE — 99999 PR PBB SHADOW E&M-EST. PATIENT-LVL III: ICD-10-PCS | Mod: PBBFAC,HCNC,, | Performed by: DERMATOLOGY

## 2019-11-06 PROCEDURE — 11102 PR TANGENTIAL BIOPSY, SKIN, SINGLE LESION: ICD-10-PCS | Mod: HCNC,S$GLB,, | Performed by: DERMATOLOGY

## 2019-11-06 PROCEDURE — 11103 TANGNTL BX SKIN EA SEP/ADDL: CPT | Mod: HCNC,S$GLB,, | Performed by: DERMATOLOGY

## 2019-11-06 PROCEDURE — 88305 TISSUE SPECIMEN TO PATHOLOGY, DERMATOLOGY: ICD-10-PCS | Mod: 26,HCNC,, | Performed by: PATHOLOGY

## 2019-11-06 PROCEDURE — 99499 NO LOS: ICD-10-PCS | Mod: HCNC,S$GLB,, | Performed by: DERMATOLOGY

## 2019-11-06 PROCEDURE — 88305 TISSUE EXAM BY PATHOLOGIST: CPT | Mod: HCNC,59 | Performed by: PATHOLOGY

## 2019-11-06 PROCEDURE — 11102 TANGNTL BX SKIN SINGLE LES: CPT | Mod: HCNC,S$GLB,, | Performed by: DERMATOLOGY

## 2019-11-06 PROCEDURE — 17000 PR DESTRUCTION(LASER SURGERY,CRYOSURGERY,CHEMOSURGERY),PREMALIGNANT LESIONS,FIRST LESION: ICD-10-PCS | Mod: HCNC,59,S$GLB, | Performed by: DERMATOLOGY

## 2019-11-06 PROCEDURE — 17000 DESTRUCT PREMALG LESION: CPT | Mod: HCNC,59,S$GLB, | Performed by: DERMATOLOGY

## 2019-11-06 PROCEDURE — 99499 UNLISTED E&M SERVICE: CPT | Mod: HCNC,S$GLB,, | Performed by: DERMATOLOGY

## 2019-11-06 PROCEDURE — 99999 PR PBB SHADOW E&M-EST. PATIENT-LVL III: CPT | Mod: PBBFAC,HCNC,, | Performed by: DERMATOLOGY

## 2019-11-06 NOTE — PROGRESS NOTES
Subjective:       Patient ID:  Castillo Anderson Jr. is a 74 y.o. male who presents for   Chief Complaint   Patient presents with    Lesion     Patient present for lesions.     C/o lesions to R calf and L forearm x 1 month. Lesions itch. R calf has grown in size. Not other change noted in shape or color. Not treating.    Bx done by LT 10/2016 Right lateral leg and right lower abd consistent with LSC/prurigo nodules .    Lifetime of high sun exposure   + NMSC on right lower leg August 2019 in [place]  No fhx skin ca    Past Medical History:  No date: Cataract      Comment:  OU  No date: Claudication of lower extremity  No date: Colon polyp  No date: COPD (chronic obstructive pulmonary disease)  No date: Hx of mumps  No date: Hyperlipidemia  No date: Kidney stone  No date: Left carotid stenosis      Comment:  procedure done to fix  No date: Lumbar disc disorder  No date: PVD (peripheral vascular disease)  No date: Sleep apnea      Comment:  uses cpap  No date: Tobacco dependence        Review of Systems   Constitutional: Negative for fever, chills and fatigue.   Skin: Positive for itching and dry skin. Negative for activity-related sunscreen use, sensitivity to antibiotic ointment, sensitivity to bandage adhesive and tendency to form keloidal scars.   Hematologic/Lymphatic: Bruises/bleeds easily.        Objective:    Physical Exam   Constitutional: He appears well-developed and well-nourished. No distress.   Neurological: He is alert and oriented to person, place, and time. He is not disoriented.   Psychiatric: He has a normal mood and affect.   Skin:   Areas Examined (abnormalities noted in diagram):   Head / Face Inspection Performed  Neck Inspection Performed  Chest / Axilla Inspection Performed  RUE Inspected  LUE Inspection Performed  RLE Inspected                           Diagram Legend     Erythematous scaling macule/papule c/w actinic keratosis       Vascular papule c/w angioma      Pigmented verrucoid  papule/plaque c/w seborrheic keratosis      Yellow umbilicated papule c/w sebaceous hyperplasia      Irregularly shaped tan macule c/w lentigo     1-2 mm smooth white papules consistent with Milia      Movable subcutaneous cyst with punctum c/w epidermal inclusion cyst      Subcutaneous movable cyst c/w pilar cyst      Firm pink to brown papule c/w dermatofibroma      Pedunculated fleshy papule(s) c/w skin tag(s)      Evenly pigmented macule c/w junctional nevus     Mildly variegated pigmented, slightly irregular-bordered macule c/w mildly atypical nevus      Flesh colored to evenly pigmented papule c/w intradermal nevus       Pink pearly papule/plaque c/w basal cell carcinoma      Erythematous hyperkeratotic cursted plaque c/w SCC      Surgical scar with no sign of skin cancer recurrence      Open and closed comedones      Inflammatory papules and pustules      Verrucoid papule consistent consistent with wart     Erythematous eczematous patches and plaques     Dystrophic onycholytic nail with subungual debris c/w onychomycosis     Umbilicated papule    Erythematous-base heme-crusted tan verrucoid plaque consistent with inflamed seborrheic keratosis     Erythematous Silvery Scaling Plaque c/w Psoriasis     See annotation              Assessment / Plan:      Pathology Orders:     Normal Orders This Visit    Tissue Specimen To Pathology, Dermatology     Questions:    Directional Terms:  Other(comment)    Clinical Information:  KA    Specific Site:  Right medial calf    Tissue Specimen To Pathology, Dermatology     Questions:    Directional Terms:  Other(comment)    Clinical Information:  prurigo nodule vs KA    Specific Site:  left forearm        Neoplasm of uncertain behavior  -     Tissue Specimen To Pathology, Dermatology  -     Tissue Specimen To Pathology, Dermatology  Shave biopsy procedure note:    Shave biopsy performed after verbal consent including risk of infection, scar, recurrence, need for additional  treatment of site. Area prepped with alcohol, anesthetized with approximately 1.0cc of 1% lidocaine with epinephrine. Lesional tissue shaved with razor blade. Hemostasis achieved with application of aluminum chloride followed by hyfrecation. No complications. Dressing applied. Wound care explained.      AK (actinic keratosis)  Premalignant nature discussed     Cryosurgery Procedure Note    Verbal consent from the patient is obtained including, but not limited to, risk of hypopigmentation/hyperpigmentation, scar, recurrence of lesion. The patient is aware of the precancerous quality and need for treatment of these lesions. Liquid nitrogen cryosurgery is applied to the 1 actinic keratoses, as detailed in the physical exam, to produce a freeze injury. The patient is aware that blisters may form and is instructed on wound care with gentle cleansing and use of vaseline ointment to keep moist until healed. The patient is supplied a handout on cryosurgery and is instructed to call if lesions do not completely resolve.      History of nonmelanoma skin cancer  Area(s) of previous NMSC evaluated with no signs of recurrence. Suspicious lesions noted.  - The signs and symptoms of skin cancer were reviewed and the patient was advised to practice sun protection and sun avoidance, use daily sunscreen, and perform regular self skin exams.                Follow up for as previously scheduled.     ADDENDUM:  1. Skin, right medial calf, shave biopsy:  - INVASIVE SQUAMOUS CELL CARCINOMA, WELL-DIFFERENTIATED, KERATOACANTHOMA TYPE.  - THE DEEP BIOPSY EDGE IS INVOLVED.  2. Skin, left forearm, shave biopsy:  - INVASIVE SQUAMOUS CELL CARCINOMA, WELL-DIFFERENTIATED, KERATOACANTHOMA TYPE.  - THE TUMOR EXTENDS TO THE DEEP AND LATERAL BIOPSY MARGINS.  [facility]  Diagnosed by: Savannah Earl    Lesion 1: recommend Mohs. Refer to Dr. Neves   Lesion 2: Recommend RTC to discuss options as pt is hesitant for surgery. Will evaluate lesion for residual SCC  and then discuss treatment options to include excision vs ED&C vs observation

## 2019-11-08 ENCOUNTER — TELEPHONE (OUTPATIENT)
Dept: DERMATOLOGY | Facility: CLINIC | Age: 74
End: 2019-11-08

## 2019-11-19 ENCOUNTER — PATIENT OUTREACH (OUTPATIENT)
Dept: ADMINISTRATIVE | Facility: OTHER | Age: 74
End: 2019-11-19

## 2019-11-21 ENCOUNTER — INITIAL CONSULT (OUTPATIENT)
Dept: DERMATOLOGY | Facility: CLINIC | Age: 74
End: 2019-11-21
Payer: MEDICARE

## 2019-11-21 VITALS
HEART RATE: 68 BPM | BODY MASS INDEX: 25.71 KG/M2 | HEIGHT: 66 IN | WEIGHT: 160 LBS | DIASTOLIC BLOOD PRESSURE: 82 MMHG | SYSTOLIC BLOOD PRESSURE: 149 MMHG

## 2019-11-21 DIAGNOSIS — C44.722 SQUAMOUS CELL CARCINOMA, LEG, RIGHT: Primary | ICD-10-CM

## 2019-11-21 PROCEDURE — 1101F PT FALLS ASSESS-DOCD LE1/YR: CPT | Mod: HCNC,CPTII,S$GLB, | Performed by: DERMATOLOGY

## 2019-11-21 PROCEDURE — 3077F SYST BP >= 140 MM HG: CPT | Mod: HCNC,CPTII,S$GLB, | Performed by: DERMATOLOGY

## 2019-11-21 PROCEDURE — 99214 OFFICE O/P EST MOD 30 MIN: CPT | Mod: HCNC,S$GLB,, | Performed by: DERMATOLOGY

## 2019-11-21 PROCEDURE — 1101F PR PT FALLS ASSESS DOC 0-1 FALLS W/OUT INJ PAST YR: ICD-10-PCS | Mod: HCNC,CPTII,S$GLB, | Performed by: DERMATOLOGY

## 2019-11-21 PROCEDURE — 3079F PR MOST RECENT DIASTOLIC BLOOD PRESSURE 80-89 MM HG: ICD-10-PCS | Mod: HCNC,CPTII,S$GLB, | Performed by: DERMATOLOGY

## 2019-11-21 PROCEDURE — 3079F DIAST BP 80-89 MM HG: CPT | Mod: HCNC,CPTII,S$GLB, | Performed by: DERMATOLOGY

## 2019-11-21 PROCEDURE — 1159F MED LIST DOCD IN RCRD: CPT | Mod: HCNC,S$GLB,, | Performed by: DERMATOLOGY

## 2019-11-21 PROCEDURE — 1126F PR PAIN SEVERITY QUANTIFIED, NO PAIN PRESENT: ICD-10-PCS | Mod: HCNC,S$GLB,, | Performed by: DERMATOLOGY

## 2019-11-21 PROCEDURE — 99999 PR PBB SHADOW E&M-EST. PATIENT-LVL III: CPT | Mod: PBBFAC,HCNC,, | Performed by: DERMATOLOGY

## 2019-11-21 PROCEDURE — 1159F PR MEDICATION LIST DOCUMENTED IN MEDICAL RECORD: ICD-10-PCS | Mod: HCNC,S$GLB,, | Performed by: DERMATOLOGY

## 2019-11-21 PROCEDURE — 3077F PR MOST RECENT SYSTOLIC BLOOD PRESSURE >= 140 MM HG: ICD-10-PCS | Mod: HCNC,CPTII,S$GLB, | Performed by: DERMATOLOGY

## 2019-11-21 PROCEDURE — 99214 PR OFFICE/OUTPT VISIT, EST, LEVL IV, 30-39 MIN: ICD-10-PCS | Mod: HCNC,S$GLB,, | Performed by: DERMATOLOGY

## 2019-11-21 PROCEDURE — 1126F AMNT PAIN NOTED NONE PRSNT: CPT | Mod: HCNC,S$GLB,, | Performed by: DERMATOLOGY

## 2019-11-21 PROCEDURE — 99999 PR PBB SHADOW E&M-EST. PATIENT-LVL III: ICD-10-PCS | Mod: PBBFAC,HCNC,, | Performed by: DERMATOLOGY

## 2019-11-21 NOTE — LETTER
November 23, 2019      Falguni Lawton MD  [address] [facility] [address] [place] [address]           [facility]  [address] [place] [address]  Phone: [phone]          Patient: Castillo Anderson Jr.   MR Number: [id number]   YOB: 1945   Date of Visit: 11/21/2019       Dear Dr. Falguni Lawton:    Thank you for referring Castillo Anderson to me for evaluation. Attached you will find relevant portions of my assessment and plan of care.    If you have questions, please do not hesitate to call me. I look forward to following Castillo Anderson along with you.    Sincerely,    Robert Neves MD    Enclosure  CC:  No Recipients    If you would like to receive this communication electronically, please contact [email or web address] or [phone] to request more information on [employer] Link access.    For providers and/or their staff who would like to refer a patient to [facility], please contact us through our one-stop-shop provider referral line, [facility], at [phone].    If you feel you have received this communication in error or would no longer like to receive these types of communications, please e-mail [email or web address]

## 2019-11-21 NOTE — PROGRESS NOTES
ALLERGIES:  No known drug allergies    CHIEF COMPLAINT:  This 74 y.o. male comes for evaluation for Mohs' Micrographic Surgery, Fresh Tissue Technique, for treatment of a biopsy-proven squamous cell carcinoma on the right medial calf. Consultation requested by Falguni Lawton M.D..    HISTORY OF PRESENT ILLNESS:   Location: Right medial calf  Duration: 3 months or more  Quality: persistent  Context: status post biopsy by Falguni Lawton M.D.; path = squamous cell carcinoma; pathology accession #[id number], [facility]   Of note, he lives in [place] half the year and will be leaving for there in several weeks  Prior Treatment: none    See also the handwritten notes/diagrams scanned to chart for additional details.    1. Skin, right medial calf, shave biopsy:  - INVASIVE SQUAMOUS CELL CARCINOMA, WELL-DIFFERENTIATED, KERATOACANTHOMA TYPE.  - THE DEEP BIOPSY EDGE IS INVOLVED.  2. Skin, left forearm, shave biopsy:  - INVASIVE SQUAMOUS CELL CARCINOMA, WELL-DIFFERENTIATED, KERATOACANTHOMA TYPE.  - THE TUMOR EXTENDS TO THE DEEP AND LATERAL BIOPSY MARGINS.  [facility]    Defibrillator: No  Pacemaker: No  Artificial heart valves: No  Artificial joints: No    REVIEW OF SYSTEMS:   General: general health good  Skin: previous skin cancer(s) Yes legs X 3 not sure what kind  Relevant other:  No   Cardiovascular:   High Blood Pressure: No   Chest Pain:  No   Defibrillator: as above  Pacemaker: as above  Artificial heart valves: as above  Prior Endocarditis: No   Prior Heart Attack/MI: No     If yes, when:   Prior Cardiac Bypass or Stents:  No   If yes, when:   Mitral Valve Prolapse: No   Relevant other:  No   Respiratory:   Relevant other:  Yes. COPD  Endocrine:   Diabetes:  No   Relevant other:  No   Hem/Lymph:   Taking Prescribed Blood Thinners:  Yes. Plavix  Easy Bleeding:  Yes   Relevant other:  No   Allergy/Immuno: as noted above  Relevant other:  No   GI:   Prior Hepatitis:  No     If yes, details:   Relevant other:  Yes  Colon Polyps. Benign  Musculoskeletal:   Artificial joints: as above  Relevant other:  No   Neurologic:   Prior Stroke:  No     If yes, details:   Relevant other:  No   Relevant other info:  Yes . 3 stents left leg. I stent on right leg     PAST MEDICAL HISTORY:  Past Medical History:   Diagnosis Date    Cataract     OU    Claudication of lower extremity     Colon polyp     COPD (chronic obstructive pulmonary disease)     Hx of mumps     Hyperlipidemia     Kidney stone     Left carotid stenosis     procedure done to fix    Lumbar disc disorder     PVD (peripheral vascular disease)     Sleep apnea     uses cpap    Tobacco dependence        PAST SURGICAL HISTORY:  Past Surgical History:   Procedure Laterality Date    CAROTID ENDARTERECTOMY Left 2015    after 6/2015    CAROTID ENDARTERECTOMY Left 06/2016    CATARACT EXTRACTION Bilateral     COLONOSCOPY  03/05/2013    Dr. Morton, repeat in 5 years for surveillance    COLONOSCOPY  2007    in legacy    COLONOSCOPY N/A 5/15/2018    Procedure: COLONOSCOPY;  Surgeon: Quentin Morton MD;  Location: [facility];  Service: Endoscopy;  Laterality: N/A;    COMPUTED TOMOGRAPHY ANGIOGRAM Left 05/26/2016    Leg    EPIDURAL BLOCK INJECTION      EYE SURGERY      FEMORAL ARTERY STENT  2010    FEMORAL ARTERY STENT Left 05/26/2016    LAMINECTOMY  2012    left sup fem and prox popliteal stents  9/30/15    x 2    TONSILLECTOMY      UVULECTOMY          SOCIAL HISTORY:  Dependencies: smoking status as noted below  Social History     Tobacco Use    Smoking status: Current Every Day Smoker     Packs/day: 1.00     Years: 55.00     Pack years: 55.00     Types: Cigarettes    Smokeless tobacco: Never Used    Tobacco comment: vapeing   Substance Use Topics    Alcohol use: Yes     Alcohol/week: 2.0 standard drinks     Types: 2 Cans of beer per week     Comment: per week    Drug use: No       PERTINENT MEDICATIONS:  See medications list.    Current Outpatient  Medications:     aspirin 81 MG Chew, 81 mg., Disp: , Rfl:     clopidogrel (PLAVIX) 75 mg tablet, TAKE 1 TABLET EVERY DAY, Disp: 90 tablet, Rfl: 0    cyanocobalamin (VITAMIN B-12) 1000 MCG tablet, Vitamin B-12  1,000 mcg tablet  Take 1 tablet every day by oral route., Disp: , Rfl:     escitalopram oxalate (LEXAPRO) 10 MG tablet, escitalopram 10 mg tablet, Disp: , Rfl:     ezetimibe (ZETIA) 10 mg tablet, TAKE 1 TABLET EVERY DAY, Disp: 90 tablet, Rfl: 2    nitroGLYCERIN (NITROSTAT) 0.4 MG SL tablet, , Disp: , Rfl: 2    nitroGLYCERIN (NITROSTAT) 0.4 MG SL tablet, nitroglycerin 0.4 mg sublingual tablet, Disp: , Rfl:     rosuvastatin (CRESTOR) 20 MG tablet, Take 1 tablet (20 mg total) by mouth once daily., Disp: 90 tablet, Rfl: 1    tamsulosin (FLOMAX) 0.4 mg Cap, , Disp: , Rfl:     trazodone (DESYREL) 50 MG tablet, Take 50 mg by mouth every evening. Every day, Disp: , Rfl:     ALLERGIES:  No known drug allergies    EXAM:  See also the handwritten notes/diagrams scanned to chart for additional details.  Constitutional  General appearance: well-developed, well-nourished, well-kempt older white male    Eyes  Inspection of conjunctivae and lids reveals no abnormalities; sclerae anicteric  Neurologic/Psychiatric  Alert,  normal orientation to time, place, person  Normal mood and affect with no evidence of depression, anxiety, agitation  Skin: see photo(s)  Head: background moderate solar damage to exposed areas of skin  Neck: examination reveals moderate chronic solar damage  Right upper extremity: examination reveals moderate chronic solar damage  Left upper extremity: examination reveals moderate chronic solar damage  Right lower extremity: there is an approx 1 cm eschar to the right medial calf; site(s) confirmed by reference to the photograph(s) attached below taken at the time of the biopsy/biopsies by the referring physician     Photo(s) this visit:      Photo(s) from biopsy visit:      ASSESSMENT: status post  biopsy, 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 In particular, the discussion included, but was not limited to, the following:    One alternative at this point would be to defer further treatment and observe the lesion(s). With small skin cancers of this kind, it is possible that a biopsy can be sufficient to definitively treat a small skin cancer of this kind. Alternatively, some skin cancers are slow growing and do not require immediate treatment. The potential advantage of this choice would be to avoid the need for possibly unnecessary additional surgery. Among the potential disadvantages of this would be the possibility of enlargement of the lesion, more extensive spread of the lesion or recurrence at a later date, which might necessitate a larger and more complex surgery.    Radiation treatment can be an effective treatment for this type of skin cancer. The usual course of treatment is every weekday for several weeks. Local irritation will result from treatment, although no systemic side effects are expected. The potential advantage of radiation treatment is that it avoids the need for surgery. Among the disadvantages of radiation treatment are the length of treatment, the local inflammatory response, the absence of pathologic confirmation of the removal of the skin cancer, a possible increased risk of additional skin cancer in the treated area in later years, and a somewhat increased risk of recurrence at a  later date.     Excisional surgery can be an effective treatment for this type of skin cancer. This would involve excision of the lesion with margin evaluation by submitting the specimen to a pathologist for either immediate marginal assessment via frozen section processing, or delayed marginal assessment by fixed-tissue processing. The potential advantage of this technique is that it offers a way of treating the lesion with some degree of histologic confirmation of tumor removal. Among the disadvantages of this treatment are the possible need for re-excision if marginal involvement is identified, a somewhat greater likelihood of recurrence as compared to Mohs' surgery because of the less comprehensive margin evaluation inherent in the technique, and the general potential risks of surgery, including allergic reactions to the anesthetic and other materials used, infection, injury to nerves in the area with consequent loss of sensation or muscle function, and scarring or distortion of surrounding structures.    Mohs' surgery is a very effective treatment for this type of skin cancer. The potential advantage of Mohs' surgery is that this technique offers the greatest possible certainty of knowing that the skin cancer has been completely removed, with the removal of the least amount of normal tissue. The potential disadvantages of Mohs' surgery include the duration of the surgery, the possible need for a separate surgery for reconstruction following tumor removal, and scarring as a result. In addition, general potential risks of surgery as noted above also apply to treatment via Mohs' surgery.    Sufficient time was available for questions, and all questions were answered to his satisfaction.     After discussing the clinical findings and the treatment alternatives, and the risks and benefits of the alternatives at length and in detail, and given the current clinical findings and absence of any evidence of residual  tumor to the biopsy site at present, he and I have decided to postpone surgical treatment and to observe the site(s) for the present.     We have offered him an appointment in one month which he declined; he will followup at his home in [place].  --------------------------------------  Note: Some or all of this note may have been generated using voice recognition software. There may be voice recognition errors including grammatical and/or spelling errors found in the text. Attempts were made to correct these errors prior to signature.

## 2019-11-22 ENCOUNTER — TELEPHONE (OUTPATIENT)
Dept: DERMATOLOGY | Facility: CLINIC | Age: 74
End: 2019-11-22

## 2019-11-22 NOTE — TELEPHONE ENCOUNTER
Spoke w/ pt. Informed pt about results and recommendations per provider. pt verbalized understanding.    Pt will follow up with  [place].

## 2020-02-04 ENCOUNTER — PES CALL (OUTPATIENT)
Dept: ADMINISTRATIVE | Facility: CLINIC | Age: 75
End: 2020-02-04

## 2020-03-03 DIAGNOSIS — E78.5 HLD (HYPERLIPIDEMIA): ICD-10-CM

## 2020-03-04 NOTE — PROGRESS NOTES
Refill Authorization Note     is requesting a refill authorization.    Brief assessment and rationale for refill: APPROVE: prr     Medication-related problems identified: Therapeutic duplication         Medication reconciliation completed: No                         Comments:   Requested Prescriptions   Pending Prescriptions Disp Refills    clopidogreL (PLAVIX) 75 mg tablet [Pharmacy Med Name: CLOPIDOGREL 75 MG Tablet] 90 tablet 0     Sig: TAKE 1 TABLET EVERY DAY       Hematology: Antiplatelets - clopidogrel Passed - 3/3/2020  9:16 AM        Passed - Patient is at least 18 years old        Passed - No contraindicated proton pump inhibitors on active medication list        Passed - Office visit in past 12 months or future 90 days.     Recent Outpatient Visits            3 months ago Neoplasm of uncertain behavior    [facility] Falguni Lawton MD    4 months ago Localized swelling of chest wall    [facility] Shameka Bell NP    5 months ago Cellulitis, unspecified cellulitis site    [facility] Shameka Bell NP    5 months ago Stenosis of left carotid artery    [facility] Andrea Salmeron MD    6 months ago Thyroid nodule    [facility] Frantz Lemos MD          Future Appointments              Tomorrow ANNUAL WELLNESS VISIT-NURSE PRACTITIONER, [place] [facility], [place]    In 1 month [facility]    In 6 months [facility]                Passed - HCT in normal range and within 360 days     Hematocrit   Date Value Ref Range Status   03/28/2019 50.0 40.0 - 54.0 % Final   11/13/2018 51.5 40.0 - 54.0 % Final   03/20/2018 50.8 40.0 - 54.0 % Final              Passed - HGB in normal range and within 360 days     Hemoglobin   Date Value Ref Range Status   03/28/2019 15.4 14.0 - 18.0 g/dL Final    11/13/2018 16.1 14.0 - 18.0 g/dL Final   03/20/2018 16.5 14.0 - 18.0 g/dL Final              Passed - PLT in normal range and within 360 days     Platelets   Date Value Ref Range Status   03/28/2019 226 150 - 350 K/uL Final   11/13/2018 224 150 - 350 K/uL Final   03/20/2018 234 150 - 350 K/uL Final             ezetimibe (ZETIA) 10 mg tablet [Pharmacy Med Name: EZETIMIBE 10 MG Tablet] 90 tablet 0     Sig: TAKE 1 TABLET EVERY DAY       Cardiovascular:  Antilipid - Sterol Transport Inhibitors Passed - 3/3/2020  9:16 AM        Passed - Patient is at least 18 years old        Passed - Office visit in past 12 months or future 90 days.     Recent Outpatient Visits            3 months ago Neoplasm of uncertain behavior    [facility]at[facility] Falguni Lawton MD    4 months ago Localized swelling of chest wall    [facility] Shameka Bell NP    5 months ago Cellulitis, unspecified cellulitis site    [facility] Shameka Bell NP    5 months ago Stenosis of left carotid artery    [facility] Andrea Salmeron MD    6 months ago Thyroid nodule    [facility] Frantz Lemos MD          Future Appointments              Tomorrow ANNUAL WELLNESS VISIT-NURSE PRACTITIONER, [place] [facility]    In 1 month [facility]    In 6 months [facility]                Passed - Lipid Panel completed in last 360 days     Lab Results   Component Value Date    CHOL 126 03/28/2019    HDL 54 03/28/2019    LDLCALC 55.4 (L) 03/28/2019    TRIG 83 03/28/2019              Appointments  past 12m or future 3m with PCP    Date Provider   Last Visit   8/30/2019 Frantz Lemos MD   Next Visit   Visit date not found Frantz Lemos MD   .  ED visits in past 90 days: 0       Note composed:3:59 PM 03/04/2020

## 2020-03-05 ENCOUNTER — OFFICE VISIT (OUTPATIENT)
Dept: FAMILY MEDICINE | Facility: CLINIC | Age: 75
End: 2020-03-05
Payer: MEDICARE

## 2020-03-05 VITALS
BODY MASS INDEX: 25.3 KG/M2 | DIASTOLIC BLOOD PRESSURE: 74 MMHG | TEMPERATURE: 99 F | WEIGHT: 157.44 LBS | HEART RATE: 82 BPM | SYSTOLIC BLOOD PRESSURE: 138 MMHG | HEIGHT: 66 IN

## 2020-03-05 DIAGNOSIS — Z00.00 ENCOUNTER FOR PREVENTIVE HEALTH EXAMINATION: Primary | ICD-10-CM

## 2020-03-05 DIAGNOSIS — I70.212 ATHEROSCLEROSIS OF NATIVE ARTERY OF LEFT LOWER EXTREMITY WITH INTERMITTENT CLAUDICATION: ICD-10-CM

## 2020-03-05 DIAGNOSIS — R91.8 PULMONARY NODULES: ICD-10-CM

## 2020-03-05 DIAGNOSIS — F33.1 MODERATE EPISODE OF RECURRENT MAJOR DEPRESSIVE DISORDER: ICD-10-CM

## 2020-03-05 DIAGNOSIS — J44.9 CHRONIC OBSTRUCTIVE PULMONARY DISEASE, UNSPECIFIED COPD TYPE: ICD-10-CM

## 2020-03-05 DIAGNOSIS — I73.9 PVD (PERIPHERAL VASCULAR DISEASE): ICD-10-CM

## 2020-03-05 DIAGNOSIS — I70.0 ATHEROSCLEROSIS OF AORTA: ICD-10-CM

## 2020-03-05 DIAGNOSIS — D69.2 SENILE PURPURA: ICD-10-CM

## 2020-03-05 PROBLEM — E53.8 COBALAMIN DEFICIENCY: Status: ACTIVE | Noted: 2020-03-05

## 2020-03-05 PROCEDURE — 3075F PR MOST RECENT SYSTOLIC BLOOD PRESS GE 130-139MM HG: ICD-10-PCS | Mod: HCNC,CPTII,S$GLB, | Performed by: NURSE PRACTITIONER

## 2020-03-05 PROCEDURE — 3075F SYST BP GE 130 - 139MM HG: CPT | Mod: HCNC,CPTII,S$GLB, | Performed by: NURSE PRACTITIONER

## 2020-03-05 PROCEDURE — 3078F DIAST BP <80 MM HG: CPT | Mod: HCNC,CPTII,S$GLB, | Performed by: NURSE PRACTITIONER

## 2020-03-05 PROCEDURE — 99999 PR PBB SHADOW E&M-EST. PATIENT-LVL V: CPT | Mod: PBBFAC,HCNC,, | Performed by: NURSE PRACTITIONER

## 2020-03-05 PROCEDURE — G0439 PR MEDICARE ANNUAL WELLNESS SUBSEQUENT VISIT: ICD-10-PCS | Mod: HCNC,S$GLB,, | Performed by: NURSE PRACTITIONER

## 2020-03-05 PROCEDURE — 99499 RISK ADDL DX/OHS AUDIT: ICD-10-PCS | Mod: HCNC,S$GLB,, | Performed by: NURSE PRACTITIONER

## 2020-03-05 PROCEDURE — G0439 PPPS, SUBSEQ VISIT: HCPCS | Mod: HCNC,S$GLB,, | Performed by: NURSE PRACTITIONER

## 2020-03-05 PROCEDURE — 99999 PR PBB SHADOW E&M-EST. PATIENT-LVL V: ICD-10-PCS | Mod: PBBFAC,HCNC,, | Performed by: NURSE PRACTITIONER

## 2020-03-05 PROCEDURE — 3078F PR MOST RECENT DIASTOLIC BLOOD PRESSURE < 80 MM HG: ICD-10-PCS | Mod: HCNC,CPTII,S$GLB, | Performed by: NURSE PRACTITIONER

## 2020-03-05 PROCEDURE — 99499 UNLISTED E&M SERVICE: CPT | Mod: HCNC,S$GLB,, | Performed by: NURSE PRACTITIONER

## 2020-03-05 RX ORDER — DULOXETIN HYDROCHLORIDE 60 MG/1
CAPSULE, DELAYED RELEASE ORAL
COMMUNITY
Start: 2020-03-01 | End: 2022-01-13

## 2020-03-05 RX ORDER — NEOMYCIN SULFATE, POLYMYXIN B SULFATE AND DEXAMETHASONE 3.5; 10000; 1 MG/ML; [USP'U]/ML; MG/ML
SUSPENSION/ DROPS OPHTHALMIC
COMMUNITY
End: 2021-01-07

## 2020-03-05 RX ORDER — FLUTICASONE FUROATE AND VILANTEROL 200; 25 UG/1; UG/1
POWDER RESPIRATORY (INHALATION)
COMMUNITY
End: 2021-01-07

## 2020-03-05 RX ORDER — TIOTROPIUM BROMIDE 18 UG/1
CAPSULE ORAL; RESPIRATORY (INHALATION)
COMMUNITY
End: 2020-11-13

## 2020-03-05 RX ORDER — OFLOXACIN 3 MG/ML
SOLUTION/ DROPS OPHTHALMIC
COMMUNITY
End: 2021-01-07

## 2020-03-05 RX ORDER — PREDNISONE 10 MG/1
TABLET ORAL
COMMUNITY
End: 2020-11-13

## 2020-03-05 RX ORDER — CLOPIDOGREL BISULFATE 75 MG/1
TABLET ORAL
Qty: 90 TABLET | Refills: 0 | Status: SHIPPED | OUTPATIENT
Start: 2020-03-05 | End: 2020-06-10

## 2020-03-05 RX ORDER — MESALAMINE 0.38 G/1
CAPSULE, EXTENDED RELEASE ORAL
COMMUNITY
End: 2020-11-20

## 2020-03-05 RX ORDER — EZETIMIBE 10 MG/1
TABLET ORAL
Qty: 90 TABLET | Refills: 0 | Status: SHIPPED | OUTPATIENT
Start: 2020-03-05 | End: 2020-05-16

## 2020-03-05 NOTE — PROGRESS NOTES
"Castillo Anderson presented for a  Medicare AWV and comprehensive Health Risk Assessment today. The following components were reviewed and updated:    · Medical history  · Family History  · Social history  · Allergies and Current Medications  · Health Risk Assessment  · Health Maintenance  · Care Team     ** See Completed Assessments for Annual Wellness Visit within the encounter summary.**     The following assessments were completed:  · Living Situation  · CAGE  · Depression Screening  · Timed Get Up and Go  · Whisper Test  · Cognitive Function Screening    · Nutrition Screening  · ADL Screening  · PAQ Screening    Vitals:    03/05/20 1112   BP: 138/74   BP Location: Left arm   Patient Position: Sitting   BP Method: Medium (Manual)   Pulse: 82   Temp: 99 °F (37.2 °C)   Weight: 71.4 kg (157 lb 6.5 oz)   Height: 5' 6" (1.676 m)     Body mass index is 25.41 kg/m².  Physical Exam   Constitutional: He is oriented to person, place, and time. He appears well-nourished.   Cardiovascular: Normal rate, regular rhythm, normal heart sounds and intact distal pulses.   Pulmonary/Chest: Effort normal and breath sounds normal.   Neurological: He is alert and oriented to person, place, and time.   Skin: Skin is warm and dry.   Vitals reviewed.      Diagnoses and health risks identified today and associated recommendations/orders:    1. Encounter for preventive health examination  Reviewed and discussed health maintenance.      2. Senile purpura  Stable- continue current treatment and follow up routinely with PCP     3. Moderate episode of recurrent major depressive disorder  Stable- continue current treatment and follow up routinely with PCP     4. Atherosclerosis of aorta  Stable- continue current treatment and follow up routinely with PCP     5. Chronic obstructive pulmonary disease, unspecified COPD type  Stable- continue current treatment and follow up routinely with PCP     6. 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 PVD (peripheral vascular disease) - bilateral LE stents  Stable- continue current treatment and follow up routinely with PCP     8. Pulmonary nodules  Stable- continue current treatment and follow up routinely with PCP     Provided Castillo with a 5-10 year written screening schedule and personal prevention plan. Recommendations were developed using the USPSTF age appropriate recommendations. Education, counseling, and referrals were provided as needed. After Visit Summary printed and given to patient which includes a list of additional screenings\tests needed.    Cindi Wood, NP

## 2020-03-05 NOTE — PATIENT INSTRUCTIONS
Counseling and Referral of Other Preventative  (Italic type indicates deductible and co-insurance are waived)    Patient Name: Castillo Anderson  Today's Date: 3/5/2020    Health Maintenance       Date Due Completion Date    Shingles Vaccine (2 of 3) 11/26/2015 10/1/2015    LDCT Lung Screen 11/05/2020 11/5/2019    High Dose Statin 03/05/2021 3/5/2020    Aspirin/Antiplatelet Therapy 03/05/2021 3/5/2020    Colonoscopy 05/15/2023 5/15/2018    Lipid Panel 03/28/2024 3/28/2019    TETANUS VACCINE 09/29/2026 9/29/2016 (Declined)    Override on 9/29/2016: Declined        No orders of the defined types were placed in this encounter.    The following information is provided to all patients.  This information is to help you find resources for any of the problems found today that may be affecting your health:                Living healthy guide: www.[facility].[place].gov      Understanding Diabetes: www.diabetes.org      Eating healthy: www.cdc.gov/healthyweight      CDC home safety checklist: www.cdc.gov/steadi/patient.html      Agency on Aging: www.goea.[place].[facility]      Alcoholics anonymous (AA): www.aa.org      Physical Activity: www.pravin.nih.gov/rz3glwn      Tobacco use: www.quitwithusla.org

## 2020-03-23 RX ORDER — ROSUVASTATIN CALCIUM 20 MG/1
20 TABLET, COATED ORAL DAILY
Qty: 90 TABLET | Refills: 1 | Status: SHIPPED | OUTPATIENT
Start: 2020-03-23 | End: 2020-10-26

## 2020-05-14 DIAGNOSIS — E78.5 HLD (HYPERLIPIDEMIA): ICD-10-CM

## 2020-05-16 RX ORDER — EZETIMIBE 10 MG/1
TABLET ORAL
Qty: 90 TABLET | Refills: 0 | Status: SHIPPED | OUTPATIENT
Start: 2020-05-16 | End: 2020-08-27

## 2020-05-16 NOTE — PROGRESS NOTES
Refill Authorization Note     is requesting a refill authorization.    Brief assessment and rationale for refill: APPROVE: needs labs     Medication-related problems identified: Requires labs    Medication Therapy Plan: NTBO(LIPID/CMP)    Medication reconciliation completed: No                         Comments:   Automatic Epic Protocol Generated Data:    Requested Prescriptions   Signed Prescriptions Disp Refills    ezetimibe (ZETIA) 10 mg tablet 90 tablet 0     Sig: TAKE 1 TABLET EVERY DAY       Cardiovascular:  Antilipid - Sterol Transport Inhibitors Failed - 5/14/2020  1:51 PM        Failed - Lipid Panel completed in last 360 days     Lab Results   Component Value Date    CHOL 126 03/28/2019    HDL 54 03/28/2019    LDLCALC 55.4 (L) 03/28/2019    TRIG 83 03/28/2019             Passed - Patient is at least 18 years old        Passed - Office visit in past 12 months or future 90 days.     Recent Outpatient Visits            2 months ago Encounter for preventive health examination    [facility] Cindi Wood NP    6 months ago Neoplasm of uncertain behavior    [facility] D[facility]at[facility] Falguni Lawton MD    7 months ago Localized swelling of chest wall    [facility] Shameka Bell NP    8 months ago Cellulitis, unspecified cellulitis site    [facility]    8 months ago Stenosis of left carotid artery    [facility] Andrea Salmeron MD          Future Appointments              In 3 months Andrea Salmeron MD [facility], [place]                   Appointments  past 12m or future 3m with PCP    Date Provider   Last Visit   8/30/2019 Frantz Lemos MD   Next Visit   Visit date not found Frantz Lemos MD   ED visits in past 90 days: 0     Note composed:1:13 PM 05/16/2020

## 2020-05-16 NOTE — PROGRESS NOTES
Please schedule patient for Labs (LIPID & CMP)    Please also check with your provider if any further labs need to be added and scheduled together.    Thanks!  [facility]     Note composed: 05/16/2020 1:14 PM

## 2020-05-22 ENCOUNTER — LAB VISIT (OUTPATIENT)
Dept: LAB | Facility: HOSPITAL | Age: 75
End: 2020-05-22
Attending: FAMILY MEDICINE
Payer: MEDICARE

## 2020-05-22 DIAGNOSIS — E78.5 HLD (HYPERLIPIDEMIA): ICD-10-CM

## 2020-05-22 PROCEDURE — 80053 COMPREHEN METABOLIC PANEL: CPT | Mod: HCNC

## 2020-05-22 PROCEDURE — 80061 LIPID PANEL: CPT | Mod: HCNC

## 2020-05-22 PROCEDURE — 36415 COLL VENOUS BLD VENIPUNCTURE: CPT | Mod: HCNC,PO

## 2020-05-23 LAB
ALBUMIN SERPL BCP-MCNC: 3.9 G/DL (ref 3.5–5.2)
ALP SERPL-CCNC: 64 U/L (ref 55–135)
ALT SERPL W/O P-5'-P-CCNC: 26 U/L (ref 10–44)
ANION GAP SERPL CALC-SCNC: 11 MMOL/L (ref 8–16)
AST SERPL-CCNC: 23 U/L (ref 10–40)
BILIRUB SERPL-MCNC: 0.4 MG/DL (ref 0.1–1)
BUN SERPL-MCNC: 17 MG/DL (ref 8–23)
CALCIUM SERPL-MCNC: 9.6 MG/DL (ref 8.7–10.5)
CHLORIDE SERPL-SCNC: 107 MMOL/L (ref 95–110)
CHOLEST SERPL-MCNC: 125 MG/DL (ref 120–199)
CHOLEST/HDLC SERPL: 2.2 {RATIO} (ref 2–5)
CO2 SERPL-SCNC: 24 MMOL/L (ref 23–29)
CREAT SERPL-MCNC: 1 MG/DL (ref 0.5–1.4)
EST. GFR  (AFRICAN AMERICAN): >60 ML/MIN/1.73 M^2
EST. GFR  (NON AFRICAN AMERICAN): >60 ML/MIN/1.73 M^2
GLUCOSE SERPL-MCNC: 78 MG/DL (ref 70–110)
HDLC SERPL-MCNC: 56 MG/DL (ref 40–75)
HDLC SERPL: 44.8 % (ref 20–50)
LDLC SERPL CALC-MCNC: 54 MG/DL (ref 63–159)
NONHDLC SERPL-MCNC: 69 MG/DL
POTASSIUM SERPL-SCNC: 4.3 MMOL/L (ref 3.5–5.1)
PROT SERPL-MCNC: 7.1 G/DL (ref 6–8.4)
SODIUM SERPL-SCNC: 142 MMOL/L (ref 136–145)
TRIGL SERPL-MCNC: 75 MG/DL (ref 30–150)

## 2020-08-11 ENCOUNTER — TELEPHONE (OUTPATIENT)
Dept: FAMILY MEDICINE | Facility: CLINIC | Age: 75
End: 2020-08-11

## 2020-08-24 ENCOUNTER — TELEPHONE (OUTPATIENT)
Dept: FAMILY MEDICINE | Facility: CLINIC | Age: 75
End: 2020-08-24

## 2020-08-24 NOTE — TELEPHONE ENCOUNTER
Patient is in need of CPAP supplies. Can the order be sent to [facility]. He can schedule a virtual appointment if he needs to. He will not be back in town until November.

## 2020-08-24 NOTE — TELEPHONE ENCOUNTER
----- Message from Judy Starkey sent at 8/24/2020 10:04 AM CDT -----  Regarding: sleep apnea  Type: Needs Medical Advice  Who Called:  patient  Best Call Back Number: [phone]  Additional Information: Patient would like to speak to the nurse about his sleep apnea.  Please call to advise.  Thanks!

## 2020-08-28 ENCOUNTER — TELEPHONE (OUTPATIENT)
Dept: FAMILY MEDICINE | Facility: CLINIC | Age: 75
End: 2020-08-28

## 2020-08-28 DIAGNOSIS — G47.33 OSA ON CPAP: Primary | ICD-10-CM

## 2020-08-28 NOTE — TELEPHONE ENCOUNTER
Please call patient back and go through question list that pertains to this order.    Then, I will sign off on order

## 2020-08-28 NOTE — TELEPHONE ENCOUNTER
Pt states that we can atleast try to send it through he gave me the name, address, and number to their facility.    [place] [id number] [facility]   [phone]

## 2020-08-31 NOTE — TELEPHONE ENCOUNTER
Spoke with [facility] they stated  That pt will need to come in and have an appointment, I called pt he stated that was fine, he will just try and see his dr over there and see about getting it over there.

## 2020-10-24 NOTE — TELEPHONE ENCOUNTER
No new care gaps identified.  Powered by [employer]. Reference number: [id number]. 10/23/2020 7:17:49 PM   CDT

## 2020-10-26 RX ORDER — ROSUVASTATIN CALCIUM 20 MG/1
TABLET, COATED ORAL
Qty: 90 TABLET | Refills: 0 | Status: SHIPPED | OUTPATIENT
Start: 2020-10-26 | End: 2021-01-25

## 2020-10-26 NOTE — TELEPHONE ENCOUNTER
Provider Staff:     Action is required for this patient.     Please schedule patient for Annual    Thanks!  [facility]     Appointments  past 12m or future 3m with PCP    Date Provider   Last Visit   8/30/2019 Frantz Lemos MD   Next Visit   Visit date not found Frantz Lemos MD     Note composed:11:15 AM 10/26/2020

## 2020-10-26 NOTE — PROGRESS NOTES
Refill Authorization Note   Castillo Anderson is requesting a refill authorization.  Brief assessment and rationale for refill: Approve    -Medication-related problems identified: Requires appointment  Medication Therapy Plan: CDMR; Appt (ANNUAL)    Medication reconciliation completed: No   Comments:   Orders Placed This Encounter    rosuvastatin (CRESTOR) 20 MG tablet      Requested Prescriptions   Signed Prescriptions Disp Refills    rosuvastatin (CRESTOR) 20 MG tablet 90 tablet 0     Sig: TAKE 1 TABLET EVERY DAY       Cardiovascular:  Antilipid - Statins Passed - 10/23/2020  7:17 PM        Passed - Patient is at least 18 years old        Passed - Office Visit within last 12 months or future 90 days.     Recent Outpatient Visits            7 months ago Encounter for preventive health examination    [facility] Medicine Cindi Wood NP    11 months ago Neoplasm of uncertain behavior    [facility]atology Falguni Lawton MD    1 year ago Localized swelling of chest wall    [facility] Shameka Bell NP    1 year ago Cellulitis, unspecified cellulitis site    [facility] Shameka Bell NP    1 year ago Stenosis of left carotid artery    [facility] Cardiology Andrea Salmeron MD          Future Appointments              In 2 weeks Williams Zheng MD [facility], [facility]                Passed - ALT is 94 or below and within 360 days     ALT   Date Value Ref Range Status   05/22/2020 26 10 - 44 U/L Final   03/28/2019 18 10 - 44 U/L Final   03/20/2018 20 10 - 44 U/L Final              Passed - AST is 54 or below and within 360 days     AST   Date Value Ref Range Status   05/22/2020 23 10 - 40 U/L Final   03/28/2019 19 10 - 40 U/L Final   03/20/2018 18 10 - 40 U/L Final              Passed - Total Cholesterol within 360 days     Cholesterol   Date Value Ref Range Status   05/22/2020 125 120 - 199 mg/dL Final      Comment:     The National Cholesterol Education Program (NCEP) has set the  following guidelines (reference ranges) for Cholesterol:  Optimal.....................<200 mg/dL  Borderline High.............200-239 mg/dL  High........................> or = 240 mg/dL     03/28/2019 126 120 - 199 mg/dL Final     Comment:     The National Cholesterol Education Program (NCEP) has set the  following guidelines (reference ranges) for Cholesterol:  Optimal.....................<200 mg/dL  Borderline High.............200-239 mg/dL  High........................> or = 240 mg/dL     11/13/2018 151 120 - 199 mg/dL Final     Comment:     The National Cholesterol Education Program (NCEP) has set the  following guidelines (reference ranges) for Cholesterol:  Optimal.....................<200 mg/dL  Borderline High.............200-239 mg/dL  High........................> or = 240 mg/dL                Passed - LDL within 360 days     LDL Cholesterol   Date Value Ref Range Status   05/22/2020 54.0 (L) 63.0 - 159.0 mg/dL Final     Comment:     The National Cholesterol Education Program (NCEP) has set the  following guidelines (reference values) for LDL Cholesterol:  Optimal.......................<130 mg/dL  Borderline High...............130-159 mg/dL  High..........................160-189 mg/dL  Very High.....................>190 mg/dL              Passed - HDL within 360 days     HDL   Date Value Ref Range Status   05/22/2020 56 40 - 75 mg/dL Final     Comment:     The National Cholesterol Education Program (NCEP) has set the  following guidelines (reference values) for HDL Cholesterol:  Low...............<40 mg/dL  Optimal...........>60 mg/dL              Passed - Triglycerides within 360 days     Triglycerides   Date Value Ref Range Status   05/22/2020 75 30 - 150 mg/dL Final     Comment:     The National Cholesterol Education Program (NCEP) has set the  following guidelines (reference values) for  triglycerides:  Normal......................<150 mg/dL  Borderline High.............150-199 mg/dL  High........................200-499 mg/dL     03/28/2019 83 30 - 150 mg/dL Final     Comment:     The National Cholesterol Education Program (NCEP) has set the  following guidelines (reference values) for triglycerides:  Normal......................<150 mg/dL  Borderline High.............150-199 mg/dL  High........................200-499 mg/dL     11/13/2018 109 30 - 150 mg/dL Final     Comment:     The National Cholesterol Education Program (NCEP) has set the  following guidelines (reference values) for triglycerides:  Normal......................<150 mg/dL  Borderline High.............150-199 mg/dL  High........................200-499 mg/dL                    Appointments  past 12m or future 3m with PCP    Date Provider   Last Visit   8/30/2019 Frantz Lemos MD   Next Visit   Visit date not found Frantz Lemos MD   ED visits in past 90 days: 0     Note composed:11:15 AM 10/26/2020

## 2020-11-13 ENCOUNTER — TELEPHONE (OUTPATIENT)
Dept: GASTROENTEROLOGY | Facility: CLINIC | Age: 75
End: 2020-11-13

## 2020-11-13 PROBLEM — K52.9 COLITIS: Status: ACTIVE | Noted: 2020-11-13

## 2020-11-13 PROBLEM — R63.4 UNINTENTIONAL WEIGHT LOSS: Status: ACTIVE | Noted: 2020-11-13

## 2020-11-13 NOTE — TELEPHONE ENCOUNTER
Left message for pt informing of appointment next week with Bhumika. Number provided for call back

## 2020-11-13 NOTE — TELEPHONE ENCOUNTER
----- Message from Quentin Mortno MD sent at 11/13/2020  9:46 AM CST -----  Have pt come in to see Kapil end of next week for review (tell him I will review everything with Bhumika).

## 2020-11-19 ENCOUNTER — PATIENT OUTREACH (OUTPATIENT)
Dept: ADMINISTRATIVE | Facility: OTHER | Age: 75
End: 2020-11-19

## 2020-11-19 NOTE — PROGRESS NOTES
Health Maintenance Due   Topic Date Due    Shingles Vaccine (2 of 3) 11/26/2015    Influenza Vaccine (1) 08/01/2020     Updates were requested from care everywhere.  Chart was reviewed for overdue Proactive [facility] Encounters (TIERRA) topics (CRS, Breast Cancer Screening, Eye exam)  Health Maintenance has been updated.  LINKS immunization registry triggered.  LINKS not responding.

## 2020-11-20 ENCOUNTER — TELEPHONE (OUTPATIENT)
Dept: GASTROENTEROLOGY | Facility: CLINIC | Age: 75
End: 2020-11-20

## 2020-11-20 ENCOUNTER — OFFICE VISIT (OUTPATIENT)
Dept: GASTROENTEROLOGY | Facility: CLINIC | Age: 75
End: 2020-11-20
Payer: MEDICARE

## 2020-11-20 VITALS — BODY MASS INDEX: 24.31 KG/M2 | WEIGHT: 151.25 LBS | HEIGHT: 66 IN

## 2020-11-20 DIAGNOSIS — R63.4 WEIGHT LOSS: ICD-10-CM

## 2020-11-20 DIAGNOSIS — K51.019 ULCERATIVE PANCOLITIS WITH COMPLICATION: Primary | ICD-10-CM

## 2020-11-20 DIAGNOSIS — Z87.898 HISTORY OF DIARRHEA: ICD-10-CM

## 2020-11-20 PROCEDURE — 99214 OFFICE O/P EST MOD 30 MIN: CPT | Mod: HCNC,S$GLB,, | Performed by: NURSE PRACTITIONER

## 2020-11-20 PROCEDURE — 99499 RISK ADDL DX/OHS AUDIT: ICD-10-PCS | Mod: S$GLB,,, | Performed by: NURSE PRACTITIONER

## 2020-11-20 PROCEDURE — 1126F AMNT PAIN NOTED NONE PRSNT: CPT | Mod: HCNC,S$GLB,, | Performed by: NURSE PRACTITIONER

## 2020-11-20 PROCEDURE — 99999 PR PBB SHADOW E&M-EST. PATIENT-LVL IV: ICD-10-PCS | Mod: PBBFAC,HCNC,, | Performed by: NURSE PRACTITIONER

## 2020-11-20 PROCEDURE — 1159F PR MEDICATION LIST DOCUMENTED IN MEDICAL RECORD: ICD-10-PCS | Mod: HCNC,S$GLB,, | Performed by: NURSE PRACTITIONER

## 2020-11-20 PROCEDURE — 99214 PR OFFICE/OUTPT VISIT, EST, LEVL IV, 30-39 MIN: ICD-10-PCS | Mod: HCNC,S$GLB,, | Performed by: NURSE PRACTITIONER

## 2020-11-20 PROCEDURE — 99499 UNLISTED E&M SERVICE: CPT | Mod: S$GLB,,, | Performed by: NURSE PRACTITIONER

## 2020-11-20 PROCEDURE — 1126F PR PAIN SEVERITY QUANTIFIED, NO PAIN PRESENT: ICD-10-PCS | Mod: HCNC,S$GLB,, | Performed by: NURSE PRACTITIONER

## 2020-11-20 PROCEDURE — 1159F MED LIST DOCD IN RCRD: CPT | Mod: HCNC,S$GLB,, | Performed by: NURSE PRACTITIONER

## 2020-11-20 PROCEDURE — 99999 PR PBB SHADOW E&M-EST. PATIENT-LVL IV: CPT | Mod: PBBFAC,HCNC,, | Performed by: NURSE PRACTITIONER

## 2020-11-20 RX ORDER — MESALAMINE 0.38 G/1
1.5 CAPSULE, EXTENDED RELEASE ORAL DAILY
Qty: 30 CAPSULE | Refills: 0 | Status: SHIPPED | OUTPATIENT
Start: 2020-11-20 | End: 2020-12-20

## 2020-11-20 NOTE — PROGRESS NOTES
Subjective:       Patient ID: Castillo Anderson Jr. is a 75 y.o. male Body mass index is 24.41 kg/m².    Chief Complaint: Other (Follow-up)    Established patient of Dr. Morton & myself.  Referred by Dr. Zheng for weight loss and colitis.    Diarrhea   This is a recurrent problem. Episode onset: started around 5/1/18 while on the cruise; recurred ~6 weeks ago. Episode frequency: has improved and no bowel movement for the past 3 days, last bowel movement on 11/17/2020 of loose stool. The patient states that diarrhea does not awaken him from sleep. Associated symptoms include abdominal pain (generalized abdominal mild cramping; improved after bowel movement, denies currently), increased flatus and weight loss (lost ~9-10 lbs over the past 3-4 weeks). Pertinent negatives include no chills, coughing, fever or vomiting. Nothing aggravates the symptoms. Risk factors: denies recent antibiotic/hospitalization. Treatments tried: reports his PCP in [place] gave him a course of an antibiotic and steroid x 1 week- no relief; PAST: apriso- helped but didn't think he needed to take long term; pepto, imodium, ashleigh-seltzer, bentyl. There is no history of inflammatory bowel disease or irritable bowel syndrome.     Review of Systems   Constitutional: Positive for weight loss (lost ~9-10 lbs over the past 3-4 weeks). Negative for appetite change, chills, fatigue and fever.        Eating blander and softer foods lately but still eating 3 meals a day; patient reports he lives in [place] half of the year   HENT: Negative for sore throat and trouble swallowing.    Respiratory: Negative for cough, choking and shortness of breath.    Cardiovascular: Negative for chest pain.   Gastrointestinal: Positive for abdominal pain (generalized abdominal mild cramping; improved after bowel movement, denies currently), diarrhea and flatus. Negative for anal bleeding, blood in stool, constipation, nausea, rectal pain and vomiting.   Genitourinary:  Negative for difficulty urinating, dysuria and flank pain.   Neurological: Negative for weakness.       Past Medical History:   Diagnosis Date    Cataract     OU    Claudication of lower extremity     Colon polyp     COPD (chronic obstructive pulmonary disease)     Hx of mumps     Hyperlipidemia     Kidney stone     Left carotid stenosis     procedure done to fix    Lumbar disc disorder     PVD (peripheral vascular disease)     Sleep apnea     uses cpap    Tobacco dependence      Past Surgical History:   Procedure Laterality Date    CAROTID ENDARTERECTOMY Left 2015    after 6/2015    CAROTID ENDARTERECTOMY Left 06/2016    CATARACT EXTRACTION Bilateral     COLONOSCOPY  03/05/2013    Dr. Morton, repeat in 5 years for surveillance    [facility]    COLONOSCOPY N/A 5/15/2018    Procedure: COLONOSCOPY;  Surgeon: Quentin Morton MD;  Location: [facility];  Service: Endoscopy;  Laterality: N/A; Erythematous, granular and inflamed mucosa in the entire colon; biopsy: colon- Chronic active colitis with cryptitis and crypt abscesses. 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    COMPUTED TOMOGRAPHY ANGIOGRAM Left 05/26/2016    Leg    EPIDURAL BLOCK INJECTION      EYE SURGERY      FEMORAL ARTERY STENT  2010    FEMORAL ARTERY STENT Left 05/26/2016    LAMINECTOMY  2012    left sup fem and prox popliteal stents  9/30/15    x 2    TONSILLECTOMY      UVULECTOMY       Family History   Problem Relation Age of Onset    Cataracts Mother     Alzheimer's disease Mother     Cancer Father         prostate    Cataracts Sister     Amblyopia Neg Hx     Blindness Neg Hx     Diabetes Neg Hx     Glaucoma Neg Hx     Hypertension Neg Hx     Macular degeneration Neg Hx     Retinal detachment Neg Hx     Strabismus Neg Hx     Stroke Neg Hx     Thyroid disease Neg Hx     Colon cancer Neg Hx     Colon polyps Neg Hx     Crohn's disease Neg Hx     Celiac disease Neg  Hx     Ulcerative colitis Neg Hx      Wt Readings from Last 10 Encounters:   11/20/20 68.6 kg (151 lb 3.8 oz)   11/13/20 67.1 kg (148 lb)   03/05/20 71.4 kg (157 lb 6.5 oz)   11/21/19 72.6 kg (160 lb)   11/06/19 70.2 kg (154 lb 12.2 oz)   10/08/19 70.2 kg (154 lb 12.2 oz)   09/18/19 68.9 kg (152 lb)   09/18/19 68.9 kg (152 lb)   09/13/19 69 kg (152 lb 1.9 oz)   09/09/19 68.7 kg (151 lb 7.3 oz)     Lab Results   Component Value Date    WBC 6.83 03/28/2019    HGB 15.4 03/28/2019    HCT 50.0 03/28/2019    MCV 90 03/28/2019     03/28/2019     CMP  Sodium   Date Value Ref Range Status   05/22/2020 142 136 - 145 mmol/L Final     Potassium   Date Value Ref Range Status   05/22/2020 4.3 3.5 - 5.1 mmol/L Final     Chloride   Date Value Ref Range Status   05/22/2020 107 95 - 110 mmol/L Final     CO2   Date Value Ref Range Status   05/22/2020 24 23 - 29 mmol/L Final     Glucose   Date Value Ref Range Status   05/22/2020 78 70 - 110 mg/dL Final     BUN   Date Value Ref Range Status   05/22/2020 17 8 - 23 mg/dL Final     Creatinine   Date Value Ref Range Status   05/22/2020 1.0 0.5 - 1.4 mg/dL Final     Calcium   Date Value Ref Range Status   05/22/2020 9.6 8.7 - 10.5 mg/dL Final     Total Protein   Date Value Ref Range Status   05/22/2020 7.1 6.0 - 8.4 g/dL Final     Albumin   Date Value Ref Range Status   05/22/2020 3.9 3.5 - 5.2 g/dL Final     Total Bilirubin   Date Value Ref Range Status   05/22/2020 0.4 0.1 - 1.0 mg/dL Final     Comment:     For infants and newborns, interpretation of results should be based  on gestational age, weight and in agreement with clinical  observations.  Premature Infant recommended reference ranges:  Up to 24 hours.............<8.0 mg/dL  Up to 48 hours............<12.0 mg/dL  3-5 days..................<15.0 mg/dL  6-29 days.................<15.0 mg/dL       Alkaline Phosphatase   Date Value Ref Range Status   05/22/2020 64 55 - 135 U/L Final     AST   Date Value Ref Range Status    05/22/2020 23 10 - 40 U/L Final     ALT   Date Value Ref Range Status   05/22/2020 26 10 - 44 U/L Final     Anion Gap   Date Value Ref Range Status   05/22/2020 11 8 - 16 mmol/L Final     eGFR if    Date Value Ref Range Status   05/22/2020 >60.0 >60 mL/min/1.73 m^2 Final     eGFR if non    Date Value Ref Range Status   05/22/2020 >60.0 >60 mL/min/1.73 m^2 Final     Comment:     Calculation used to obtain the estimated glomerular filtration  rate (eGFR) is the CKD-EPI equation.        Lab Results   Component Value Date    TSH 0.973 08/30/2019 5/7/2018 stool studies reviewed    Reviewed prior medical records including radiology report of 5/7/2018 abdominal x-ray; 10/1/16 ct renal stone abdomen pelvis & endoscopy history (see surgical history).    Objective:      Physical Exam  Vitals signs and nursing note reviewed.   Constitutional:       General: He is not in acute distress.     Appearance: Normal appearance. He is well-developed. He is not diaphoretic.   HENT:      Mouth/Throat:      Comments: Patient is wearing a face mask, which covers patient's mouth and nose, due to COVID 19 concerns.  Eyes:      General: No scleral icterus.     Conjunctiva/sclera: Conjunctivae normal.      Pupils: Pupils are equal, round, and reactive to light.   Cardiovascular:      Rate and Rhythm: Normal rate.   Pulmonary:      Effort: Pulmonary effort is normal. No respiratory distress.      Breath sounds: Normal breath sounds. No wheezing.   Abdominal:      General: There is no distension or abdominal bruit.      Palpations: Abdomen is soft. Abdomen is not rigid. There is no mass.      Tenderness: There is no abdominal tenderness. There is no guarding or rebound. Negative signs include Lenz's sign and McBurney's sign.      Hernia: No hernia is present.   Skin:     General: Skin is warm and dry.      Coloration: Skin is not pale.      Findings: No erythema or rash.      Comments: Non-jaundiced    Neurological:      Mental Status: He is alert and oriented to person, place, and time.   Psychiatric:         Behavior: Behavior normal.         Thought Content: Thought content normal.         Judgment: Judgment normal.         Assessment:       1. Ulcerative pancolitis with complication    2. Weight loss    3. History of diarrhea        Plan:     discussed case with Dr. Morton; patient seen in clinic with Dr. Morton; Dr. Morton reports patient with history of ulcerative colitis, recommends patient restart Apriso, CT scan as ordered below and agreed with below management plan; patient verbalized understanding and patient agreed with management plan    Ulcerative pancolitis with complication  -     CT Abdomen Pelvis With Contrast; Future; Expected date: 11/20/2020  -     Calprotectin; Future; Expected date: 11/20/2020  -     C-reactive protein; Future; Expected date: 11/20/2020  -     Occult blood x 1, stool; Future; Expected date: 11/20/2020  -  RESTART   mesalamine (APRISO) 0.375 gram Cp24; Take 4 capsules (1.5 g total) by mouth once daily.  Dispense: 30 capsule; Refill: 0  -     Creatinine, serum; Future; Expected date: 11/20/2020  - follow-up with Dr. Nino for continued evaluation and management  - avoid use of NSAIDs- since they can cause GI upset, bleeding and/or ulcers.    Weight loss  -     CT Abdomen Pelvis With Contrast; Future; Expected date: 11/20/2020  -     TSH; Future; Expected date: 11/20/2020  -     Tissue Transglutaminase, IgA; Future; Expected date: 11/20/2020  -     IgA; Future; Expected date: 11/20/2020  -     Occult blood x 1, stool; Future; Expected date: 11/20/2020  - encouraged PO intake and daily calorie counts to ensure adequate nutrition is taken in, recommend at least 2,000 calories a day  - recommend nutritional drinks, such as Boost, Ensure or Glucerna, to supplement nutrition needs    History of diarrhea  -     CT Abdomen Pelvis With Contrast; Future; Expected date:  11/20/2020  -     Calprotectin; Future; Expected date: 11/20/2020  -     C-reactive protein; Future; Expected date: 11/20/2020  -     TSH; Future; Expected date: 11/20/2020  -     Tissue Transglutaminase, IgA; Future; Expected date: 11/20/2020  -     IgA; Future; Expected date: 11/20/2020  -     Stool Exam-Ova,Cysts,Parasites; Future; Expected date: 11/20/2020  -     Fecal fat, qualitative; Future; Expected date: 11/20/2020  -     Giardia / Cryptosporidum, EIA; Future; Expected date: 11/20/2020  -     Occult blood x 1, stool; Future; Expected date: 11/20/2020  -     pH, stool; Future; Expected date: 11/20/2020  -     Rotavirus antigen, stool; Future; Expected date: 11/20/2020  -     WBC, Stool; Future; Expected date: 11/20/2020  -     Stool culture; Future; Expected date: 11/20/2020  -     Clostridium difficile EIA; Future; Expected date: 11/20/2020  -     Adenovirus Molecular Detection, PCR, Non-Blood Stool; Future; Expected date: 11/20/2020  - recommended OTC probiotic, such as Align or Culturelle, as directed  - avoid lactose    Follow up in about 1 month (around 12/20/2020), or if symptoms worsen or fail to improve, for follow-up with Dr. Nino for continued evaluation and management.      If no improvement in symptoms or symptoms worsen, call/follow-up at clinic or go to ER.

## 2020-11-20 NOTE — LETTER
November 20, 2020        Williams Zheng MD  [address]  [address]  [facility] [address]             [place] - G[facility]ology  [address]  [facility] [address]  Phone: [phone]   Patient: Castillo Anderson Jr.   MR Number: [id number]   YOB: 1945   Date of Visit: 11/20/2020       Dear Dr. Zheng:    Thank you for referring Castillo Anderson to me for evaluation. Below are the relevant portions of my assessment and plan of care.            If you have questions, please do not hesitate to call me. I look forward to following Castillo along with you.    Sincerely,      Angie Dick, MARIUSZ           CC  No Recipients

## 2020-11-20 NOTE — PATIENT INSTRUCTIONS
Ulcerative Colitis  You have been diagnosed with ulcerative colitis. Ulcerative colitis is a chronic condition that causes inflammation and ulcers in the rectum and colon. It is a form of inflammatory bowel disease (IBD). The disease is usually diagnosed by a special procedure called a colonoscopy. The symptoms usually develop over time. There is no medicine that can cure ulcerative colitis. The goal of treatment is to reduce the symptoms, and cause a remission.  Symptoms of ulcerative colitis include:  · Abdominal cramps and pain  · Diarrhea, usually bloody  · Rectal bleeding  · Rectal pain  · Fever  · Decreased appetite and weight loss  · Low energy  · Inflammation outside of the colon can occur and can cause pain or swelling in places like the eyes, skin, and joints  Home care  No one knows what exactly causes IBD. The goal is to control and relieve the symptoms, and prevent complications, so you can lead a full and active life. No medicine can cure the disease, but in some cases, surgery to remove the whole colon can be curative. However, surgery causes other side effects and so medicines are often preferred. Discuss your options with your healthcare provider.  Diet  Your diet did not cause your condition, but it can affect it. Unfortunately, no one diet that works for everyone, so you have to experiment. Below are some recommendations, but what works for you may be different. Keep a food log to figure out what you are sensitive to.  · Eat more slowly. Eat smaller amounts at a time, but more often. Remember, you can always eat more, but can't eat less once you've eaten too much.  · High-fiber foods are complicated. While they may help constipation, they can make bloating, cramping, gas, and diarrhea worse.  · Eat less sugar.  · Try avoiding dairy products if you feel you are sensitive to lactose.  · Try cutting out foods that are high in fat and fatty meats.  · You can control bloating and passing excess gas.  "Be careful with "gassy" vegetables and fruits like beans, cabbage, broccoli, and cauliflower.  · Be careful of carbonated beverages and fruit juices. They can make bloating and diarrhea worse.  · Caffeine, alcohol, and stimulants may make symptoms worse.  Lifestyle  Although stress doesn't cause IBD, it is a factor in flare-ups, and how you feel and react to your condition.  · Look for things that seem to make your symptoms worse, such as stress and emotions.  · Counseling can help you deal with stress. So can self-help measure like exercise, yoga, and meditation.  · Depression can be a part of this illness and antidepressant medicine may be prescribed. This may actually help with diarrhea, constipation, and cramping, as well as symptoms of depression.  · Smoking can make symptoms worse.  · Lack of sleep can make symptoms seem worse.  · Alcohol use can make symptoms worse.  Medicines  Your healthcare provider may prescribe medicines. Take them as directed. In most situations, lifelong medicine is necessary. For acute flares, additional prescription medicines can be prescribed. Call your provider if you need these.  · Ask your healthcare provider before taking any medicines for diarrhea.  · Avoid anti-inflammatory medicines like ibuprofen or naproxen.  · Consider nutritional supplements. This is especially true if the diarrhea is prolonged, or you aren't eating or are losing weight.  Follow-up care  Follow up with your healthcare provider, or as advised. Tell your provider if you lose more than 5 pounds over 3 to 6 months, and you aren't trying to lose weight.  If a stool sample was taken, or cultures were done, you will be told if they are positive, or if your treatment needs to be changed. You can call as directed for results.  If X-rays were done, a radiologist will look at them. You will be told if you need a change in treatment  It is very important to tell your doctor if you intend to get pregnant, or find out " you are pregnant. You will need to discuss your disease, medicines, and plan as early as possible and preferably before you conceive.  Call 911  Call 911 if any of these occur:  · Trouble breathing  · Confusion  · Very drowsy or trouble awakening  · Fainting or loss of consciousness  · Rapid heart rate  · Chest pain  When to seek medical advice  Call your healthcare provider right away if any of these occur:  · Bleeding from your rectum  · Frequent diarrhea or abdominal pain that's not controlled by your medicine  · Bloody diarrhea  · Fever of 100.4ºF (38ºC) or higher, or as directed by your health care provider  · Persistent nausea or repeated vomiting   Date Last Reviewed: 12/30/2015  © 0302-5966 [employer]. [address] [place] [address], [place] [address]. All rights reserved. This information is not intended as a substitute for professional medical care. Always follow your healthcare professional's instructions.

## 2020-11-20 NOTE — TELEPHONE ENCOUNTER
----- Message from Geno Ovalles sent at 11/20/2020  3:38 PM CST -----  Regarding: Message  Reason: Insurance company denied the prescription.         Contact: [phone]

## 2020-11-22 DIAGNOSIS — E78.5 HLD (HYPERLIPIDEMIA): ICD-10-CM

## 2020-11-22 NOTE — TELEPHONE ENCOUNTER
Care Due:                  Date            Visit Type   Department     Provider  --------------------------------------------------------------------------------                                ESTABLISHED                              PATIENT      [facility] FAMILY  Last Visit: 08-      [facility]       Frantz BUENO  Next Visit: None Scheduled  None         None Found                                                            Last  Test          Frequency    Reason                     Performed    Due Date  --------------------------------------------------------------------------------    Office Visit  12 months..  ezetimibe, rosuvastatin..  08- 08-    Powered by [employer]. Reference number: [id number]. 11/22/2020 2:37:23 PM CST   Sheath #1: Closed using R-Band. Radial band pressure set at: 15.

## 2020-11-23 RX ORDER — EZETIMIBE 10 MG/1
TABLET ORAL
Qty: 90 TABLET | Refills: 0 | Status: SHIPPED | OUTPATIENT
Start: 2020-11-23 | End: 2021-01-31

## 2020-11-23 NOTE — TELEPHONE ENCOUNTER
Provider Staff:     Action is required for this patient.     Please schedule patient for Annual    Thanks!  [facility]     Appointments  past 12m or future 3m with PCP    Date Provider   Last Visit   8/30/2019 Frantz Lemos MD   Next Visit   Visit date not found Frantz Lemos MD     Note composed:12:15 PM 11/23/2020

## 2020-11-23 NOTE — PROGRESS NOTES
Refill Authorization Note   Castillo Anderson is requesting a refill authorization.  Brief assessment and rationale for refill: Approve    -Medication-related problems identified: Requires appointment  Medication Therapy Plan: CDMR. NEEDS APPT(ANNUAL)    Medication reconciliation completed: No   Comments:       Requested Prescriptions   Pending Prescriptions Disp Refills    ezetimibe (ZETIA) 10 mg tablet [Pharmacy Med Name: EZETIMIBE 10 MG Tablet] 90 tablet 0     Sig: TAKE 1 TABLET EVERY DAY       Cardiovascular:  Antilipid - Sterol Transport Inhibitors Passed - 11/22/2020  2:37 PM        Passed - Patient is at least 18 years old        Passed - Office visit in past 12 months or future 90 days     Recent Outpatient Visits            3 days ago Ulcerative pancolitis with complication    [facility] G[facility] MARIUSZ Lopez    1 week ago Colitis    [facility] Williams Zheng MD    8 months ago Encounter for preventive health examination    [facility] Cindi Wood NP    1 year ago Neoplasm of uncertain behavior    [facility] Dermatology Falguni Lawton MD    1 year ago Localized swelling of chest wall    [facility] Shameka Bell NP          Future Appointments              Tomorrow [facility] CT1 LIMIT 500 LBS [facility]    In 2 days LAB, [place] [facility]    In 2 days LAB, [place] [facility]    In 1 month Rishi Nino MD [facility]                Passed - Total Cholesterol within 360 days     Cholesterol   Date Value Ref Range Status   05/22/2020 125 120 - 199 mg/dL Final     Comment:     The National Cholesterol Education Program (NCEP) has set the  following guidelines (reference ranges) for Cholesterol:  Optimal.....................<200 mg/dL  Borderline  High.............200-239 mg/dL  High........................> or = 240 mg/dL     03/28/2019 126 120 - 199 mg/dL Final     Comment:     The National Cholesterol Education Program (NCEP) has set the  following guidelines (reference ranges) for Cholesterol:  Optimal.....................<200 mg/dL  Borderline High.............200-239 mg/dL  High........................> or = 240 mg/dL     11/13/2018 151 120 - 199 mg/dL Final     Comment:     The National Cholesterol Education Program (NCEP) has set the  following guidelines (reference ranges) for Cholesterol:  Optimal.....................<200 mg/dL  Borderline High.............200-239 mg/dL  High........................> or = 240 mg/dL                Passed - LDL within 360 days     LDL Cholesterol   Date Value Ref Range Status   05/22/2020 54.0 (L) 63.0 - 159.0 mg/dL Final     Comment:     The National Cholesterol Education Program (NCEP) has set the  following guidelines (reference values) for LDL Cholesterol:  Optimal.......................<130 mg/dL  Borderline High...............130-159 mg/dL  High..........................160-189 mg/dL  Very High.....................>190 mg/dL              Passed - HDL within 360 days     HDL   Date Value Ref Range Status   05/22/2020 56 40 - 75 mg/dL Final     Comment:     The National Cholesterol Education Program (NCEP) has set the  following guidelines (reference values) for HDL Cholesterol:  Low...............<40 mg/dL  Optimal...........>60 mg/dL              Passed - Triglycerides within 360 days     Triglycerides   Date Value Ref Range Status   05/22/2020 75 30 - 150 mg/dL Final     Comment:     The National Cholesterol Education Program (NCEP) has set the  following guidelines (reference values) for triglycerides:  Normal......................<150 mg/dL  Borderline High.............150-199 mg/dL  High........................200-499 mg/dL     03/28/2019 83 30 - 150 mg/dL Final     Comment:     The National Cholesterol  Education Program (NCEP) has set the  following guidelines (reference values) for triglycerides:  Normal......................<150 mg/dL  Borderline High.............150-199 mg/dL  High........................200-499 mg/dL     11/13/2018 109 30 - 150 mg/dL Final     Comment:     The National Cholesterol Education Program (NCEP) has set the  following guidelines (reference values) for triglycerides:  Normal......................<150 mg/dL  Borderline High.............150-199 mg/dL  High........................200-499 mg/dL                Passed - AST is between 0 and 54 and within 360 days     AST   Date Value Ref Range Status   05/22/2020 23 10 - 40 U/L Final   03/28/2019 19 10 - 40 U/L Final   03/20/2018 18 10 - 40 U/L Final              Passed - ALT is between 0 and 94 and within 360 days     ALT   Date Value Ref Range Status   05/22/2020 26 10 - 44 U/L Final   03/28/2019 18 10 - 44 U/L Final   03/20/2018 20 10 - 44 U/L Final                  Appointments  past 12m or future 3m with PCP    Date Provider   Last Visit   8/30/2019 Frantz Lemos MD   Next Visit   Visit date not found Frantz Lemos MD   ED visits in past 90 days: 0     Note composed:12:13 PM 11/23/2020

## 2020-11-24 ENCOUNTER — TELEPHONE (OUTPATIENT)
Dept: GASTROENTEROLOGY | Facility: CLINIC | Age: 75
End: 2020-11-24

## 2020-11-24 ENCOUNTER — HOSPITAL ENCOUNTER (OUTPATIENT)
Dept: RADIOLOGY | Facility: HOSPITAL | Age: 75
Discharge: HOME | End: 2020-11-24
Attending: NURSE PRACTITIONER
Payer: MEDICARE

## 2020-11-24 DIAGNOSIS — Z87.898 HISTORY OF DIARRHEA: ICD-10-CM

## 2020-11-24 DIAGNOSIS — R63.4 WEIGHT LOSS: ICD-10-CM

## 2020-11-24 DIAGNOSIS — K51.019 ULCERATIVE PANCOLITIS WITH COMPLICATION: ICD-10-CM

## 2020-11-24 PROCEDURE — 25500020 PHARM REV CODE 255: Mod: HCNC,PO | Performed by: NURSE PRACTITIONER

## 2020-11-24 PROCEDURE — 74177 CT ABD & PELVIS W/CONTRAST: CPT | Mod: 26,HCNC,, | Performed by: RADIOLOGY

## 2020-11-24 PROCEDURE — A9698 NON-RAD CONTRAST MATERIALNOC: HCPCS | Mod: HCNC,PO | Performed by: NURSE PRACTITIONER

## 2020-11-24 PROCEDURE — 74177 CT ABD & PELVIS W/CONTRAST: CPT | Mod: TC,HCNC,PO

## 2020-11-24 PROCEDURE — 74177 CT ABDOMEN PELVIS WITH CONTRAST: ICD-10-PCS | Mod: 26,HCNC,, | Performed by: RADIOLOGY

## 2020-11-24 RX ADMIN — IOHEXOL 75 ML: 350 INJECTION, SOLUTION INTRAVENOUS at 03:11

## 2020-11-24 RX ADMIN — IOHEXOL 1000 ML: 9 SOLUTION ORAL at 03:11

## 2020-11-25 ENCOUNTER — TELEPHONE (OUTPATIENT)
Dept: GASTROENTEROLOGY | Facility: CLINIC | Age: 75
End: 2020-11-25

## 2020-11-25 DIAGNOSIS — R93.89 ABNORMAL FINDING ON CT SCAN: Primary | ICD-10-CM

## 2020-11-25 DIAGNOSIS — K51.019 ULCERATIVE PANCOLITIS WITH COMPLICATION: ICD-10-CM

## 2020-11-25 RX ORDER — MESALAMINE 1000 MG/1
1000 SUPPOSITORY RECTAL NIGHTLY
Qty: 30 SUPPOSITORY | Refills: 1 | Status: SHIPPED | OUTPATIENT
Start: 2020-11-25 | End: 2023-07-03

## 2020-11-25 NOTE — TELEPHONE ENCOUNTER
"Discussed case and results with Dr. Morton. Dr. Morton recommended continuing Apriso as prescribed and adding on either Canasa or Rowasa (whichever is covered/patient preference). Also he recommends the patient follow-up in 4 weeks and if symptoms have not improved (diarrhea, weight loss, etc), then schedule colonoscopy.    Please review the results and recommendations with the patient- radiology report of the Ct abdomen pelvis showed "Moderate constipation with some rectal wall thickening which is nonspecific but would suggest proctitis or distal colitis": Dr. Morton recommended continuing Apriso as prescribed and adding on either Canasa suppositories to treat rectal thickening (sent to pharmacy on file). Also he recommends the patient follow-up in 4 weeks and if symptoms have not improved (diarrhea, weight loss, etc), then schedule another colonoscopy.     "Prostate gland is enlarged and indents the bladder base and there is mild bladder wall thickening" & "Nonobstructing renal calculi are seen": Recommend follow-up with urology for continued evaluation and management of these findings.     "Extensive vascular calcifications without evidence of aneurysm": Recommend follow-up with cardiology for continued evaluation and management (established with Dr. Salmeron).     Fatty liver infiltration (seen on prior imaging): For fatty liver recommend: low fat, low cholesterol diet, maintain good control of blood sugars and cholesterol levels, exercise, weight loss (if overweight), minimize/avoid alcohol and tylenol products, & follow-up with PCP for continued evaluation and management; if specialist is needed, recommend seeing hepatology.     "Bilateral adrenal masses again thought to represent ... bilateral adrenal adenoma." These are unchanged (they are typically benign) since prior imaging study. Recommend follow-up with Primary Care Provider/Endocrinology for continued evaluation and management of this " finding.    Some blood work is still pending (we will notify you once received and reviewed), but the ones that have come back show normal inflammatory marker, normal thyroid hormone level, & normal kidney function levels.    Continue with previous recommendations. If no improvement in symptoms or symptoms worsen, call/follow-up at clinic or go to ER.  Please release results to patient's mychart once you have discussed results and recommendations with patient.  Thanks,

## 2020-11-25 NOTE — TELEPHONE ENCOUNTER
Spoke with pt. Informed of results & recommendations per AN Dick NP. Pt verbalized understanding to all.

## 2020-11-25 NOTE — TELEPHONE ENCOUNTER
----- Message from Sam Hernadez sent at 11/25/2020  4:33 PM CST -----  Type: Needs Medical Advice    Who Called:  Patient  Best Call Back Number: [phone]  Additional Information: Patient would like to discuss test results. Please call to advise. Thanks!

## 2020-11-27 ENCOUNTER — TELEPHONE (OUTPATIENT)
Dept: GASTROENTEROLOGY | Facility: CLINIC | Age: 75
End: 2020-11-27

## 2020-11-27 NOTE — TELEPHONE ENCOUNTER
----- Message from MARIUSZ Lopez sent at 11/27/2020 12:47 PM CST -----  Please call to inform & review the results with the patient- Remaining lab results are normal: negative screening for celiac.   Continue with previous recommendations.  Thanks,  Angie VEE-C

## 2020-11-27 NOTE — TELEPHONE ENCOUNTER
----- Message from Geno Pace sent at 11/27/2020  9:24 AM CST -----  Type: Needs Medical Advice  Who Called:  Patient   Best Call Back Number:   Additional Information: per patient requesting a call back-please advise-thank you

## 2020-11-30 ENCOUNTER — TELEPHONE (OUTPATIENT)
Dept: GASTROENTEROLOGY | Facility: CLINIC | Age: 75
End: 2020-11-30

## 2020-11-30 ENCOUNTER — PATIENT MESSAGE (OUTPATIENT)
Dept: GASTROENTEROLOGY | Facility: CLINIC | Age: 75
End: 2020-11-30

## 2020-11-30 DIAGNOSIS — K59.00 CONSTIPATION, UNSPECIFIED CONSTIPATION TYPE: Primary | ICD-10-CM

## 2020-11-30 RX ORDER — POLYETHYLENE GLYCOL 3350 17 G/17G
17 POWDER, FOR SOLUTION ORAL DAILY
Qty: 510 G | Refills: 2 | Status: SHIPPED | OUTPATIENT
Start: 2020-11-30 | End: 2020-12-30

## 2020-11-30 NOTE — TELEPHONE ENCOUNTER
PA for apriso 0.375 mg capsule initiated. Reference #[id number]  PA for canasa 1000 mg suppositories initiated. Reference # [id number]

## 2020-12-02 ENCOUNTER — OFFICE VISIT (OUTPATIENT)
Dept: FAMILY MEDICINE | Facility: CLINIC | Age: 75
End: 2020-12-02
Payer: MEDICARE

## 2020-12-02 DIAGNOSIS — F32.A DEPRESSION, UNSPECIFIED DEPRESSION TYPE: ICD-10-CM

## 2020-12-02 DIAGNOSIS — K52.9 COLITIS: ICD-10-CM

## 2020-12-02 DIAGNOSIS — J44.9 CHRONIC OBSTRUCTIVE PULMONARY DISEASE, UNSPECIFIED COPD TYPE: Primary | ICD-10-CM

## 2020-12-02 DIAGNOSIS — G47.00 INSOMNIA, UNSPECIFIED TYPE: ICD-10-CM

## 2020-12-02 PROCEDURE — 1159F MED LIST DOCD IN RCRD: CPT | Mod: HCNC,95,, | Performed by: FAMILY MEDICINE

## 2020-12-02 PROCEDURE — 99214 OFFICE O/P EST MOD 30 MIN: CPT | Mod: HCNC,95,, | Performed by: FAMILY MEDICINE

## 2020-12-02 PROCEDURE — 1159F PR MEDICATION LIST DOCUMENTED IN MEDICAL RECORD: ICD-10-PCS | Mod: HCNC,95,, | Performed by: FAMILY MEDICINE

## 2020-12-02 PROCEDURE — 99214 PR OFFICE/OUTPT VISIT, EST, LEVL IV, 30-39 MIN: ICD-10-PCS | Mod: HCNC,95,, | Performed by: FAMILY MEDICINE

## 2020-12-02 NOTE — PROGRESS NOTES
Subjective:       Patient ID: Castillo Anderson Jr. is a 75 y.o. male.    Chief Complaint: No chief complaint on file.    HPI     The patient location is: home  The chief complaint leading to consultation is: copd    Visit type: audiovisual    Face to Face time with patient:   25 minutes of total time spent on the encounter, which includes face to face time and non-face to face time preparing to see the patient (eg, review of tests), Obtaining and/or reviewing separately obtained history, Documenting clinical information in the electronic or other health record, Independently interpreting results (not separately reported) and communicating results to the patient/family/caregiver, or Care coordination (not separately reported).         Each patient to whom he or she provides medical services by telemedicine is:  (1) informed of the relationship between the physician and patient and the respective role of any other health care provider with respect to management of the patient; and (2) notified that he or she may decline to receive medical services by telemedicine and may withdraw from such care at any time.    Notes:     Recent cbc was wnl.     I discussed his recent ct abdomen report with him. Seeing GI for colitis. Currently, patient has no diarrhea but being managed for constipation.     Copd stable. No worsening cough or wheeze.  Smoker. sees Dr. Zheng, pulmonology for management.      Depression and insomnia stable.     Review of Systems    Objective:      Physical Exam      CT ABDOMEN PELVIS WITH CONTRAST on 11/24/2020:    Impression:     Moderate constipation with some rectal wall thickening which is nonspecific but would suggest proctitis or distal colitis.     Prostate gland is enlarged and indents the bladder base and there is mild bladder wall thickening.     Nonobstructing renal calculi are seen.     Extensive vascular calcifications without evidence of aneurysm.     Fatty liver infiltration     Bilateral  adrenal masses again thought to represent fine after bilateral adrenal adenomata.      Assessment:       1. Chronic obstructive pulmonary disease, unspecified COPD type    2. Depression, unspecified depression type    3. Insomnia, unspecified type    4. Colitis        Plan:       Chronic obstructive pulmonary disease, unspecified COPD type    Depression, unspecified depression type    Insomnia, unspecified type    Colitis        Plan:  Cont current meds  F/u with GI        Medication List with Changes/Refills   Current Medications    ASPIRIN 81 MG CHEW    81 mg.    CLOPIDOGREL (PLAVIX) 75 MG TABLET    TAKE 1 TABLET EVERY DAY    DULOXETINE (CYMBALTA) 60 MG CAPSULE        ESCITALOPRAM OXALATE (LEXAPRO) 10 MG TABLET    escitalopram 10 mg tablet    EZETIMIBE (ZETIA) 10 MG TABLET    TAKE 1 TABLET EVERY DAY    FLUTICASONE FUROATE-VILANTEROL (BREO) 200-25 MCG/DOSE DSDV DISKUS INHALER    Breo Ellipta 200 mcg-25 mcg/dose powder for inhalation    MESALAMINE (APRISO) 0.375 GRAM CP24    Take 4 capsules (1.5 g total) by mouth once daily.    MESALAMINE (CANASA) 1000 MG SUPP    Place 1 suppository (1,000 mg total) rectally nightly.    MULTIVIT WITH MINERALS/LUTEIN (MULTIVITAMIN 50 PLUS ORAL)    multivitamin    NEOMYCIN-POLYMYXIN-DEXAMETHASONE (MAXITROL) 3.5MG/ML-10,000 UNIT/ML-0.1 % DRPS    neomycin-polymyxin-dexameth 3.5 mg/mL-10,000 unit/mL-0.1% eye drops    NEPAFENAC (ILEVRO) 0.3 % DRPS    Ilevro 0.3 % eye drops,suspension    NITROGLYCERIN (NITROSTAT) 0.4 MG SL TABLET    nitroglycerin 0.4 mg sublingual tablet    OFLOXACIN (OCUFLOX) 0.3 % OPHTHALMIC SOLUTION    ofloxacin 0.3 % eye drops    POLYETHYLENE GLYCOL (GLYCOLAX) 17 GRAM/DOSE POWDER    Take 17 g by mouth once daily. Mix with 8 oz of water/liquid.    ROSUVASTATIN (CRESTOR) 20 MG TABLET    TAKE 1 TABLET EVERY DAY    TRAZODONE (DESYREL) 50 MG TABLET    Take 50 mg by mouth every evening. Every day

## 2020-12-04 ENCOUNTER — PES CALL (OUTPATIENT)
Dept: ADMINISTRATIVE | Facility: CLINIC | Age: 75
End: 2020-12-04

## 2020-12-16 NOTE — TELEPHONE ENCOUNTER
No new care gaps identified.  Powered by [employer]. Reference number: [id number]. 12/16/2020 8:56:49 AM   CST

## 2020-12-16 NOTE — PROGRESS NOTES
Refill Routing Note   Medication(s) are not appropriate for processing by [facility] for the following reason(s):     - Drug-Disease Interaction (clopidogreL and Ulcerative pancolitis with complication  // clopidogreL and Senile purpura;)  [facility] action(s):  Defer  Medication-related problems identified: Drug-disease interaction  Medication Therapy Plan: CDMR. Drug-Disease: clopidogreL and Ulcerative pancolitis with complication; Drug-Disease: clopidogreL and Senile purpura; Defer to PCP  Medication reconciliation completed: No   Automatic Epic Generated Protocol Data:        Requested Prescriptions   Pending Prescriptions Disp Refills    clopidogreL (PLAVIX) 75 mg tablet [Pharmacy Med Name: CLOPIDOGREL 75 MG Tablet] 90 tablet 3     Sig: TAKE 1 TABLET EVERY DAY       Hematology: Antiplatelets - clopidogrel Passed - 12/16/2020  8:56 AM        Passed - Patient is at least 18 years old        Passed - No contraindicated proton pump inhibitors on active medication list        Passed - Office visit in past 12 months or future 90 days     Recent Outpatient Visits            2 weeks ago Chronic obstructive pulmonary disease, unspecified COPD type    [facility] Frantz Lemos MD    3 weeks ago Ulcerative pancolitis with complication    [place] - Gastroenterology MARIUSZ Lopez    1 month ago Colitis    [facility] Williams Zheng MD    9 months ago Encounter for preventive health examination    [facility] Medicine Cindi Wood NP    1 year ago Neoplasm of uncertain behavior    [facility]at[facility] Falguni Lawton MD          Future Appointments              In 3 weeks PR[facility]ANKA Duggan  [facility]    In 1 month [facility]                Passed - HCT in normal range and within 360 days     Hematocrit   Date Value Ref Range Status    11/24/2020 47.3 40.0 - 54.0 % Final   03/28/2019 50.0 40.0 - 54.0 % Final   11/13/2018 51.5 40.0 - 54.0 % Final              Passed - HGB in normal range and within 360 days     Hemoglobin   Date Value Ref Range Status   11/24/2020 15.7 14.0 - 18.0 g/dL Final   03/28/2019 15.4 14.0 - 18.0 g/dL Final   11/13/2018 16.1 14.0 - 18.0 g/dL Final              Passed - PLT in normal range and within 360 days     Platelets   Date Value Ref Range Status   11/24/2020 204 150 - 350 K/uL Final   03/28/2019 226 150 - 350 K/uL Final   11/13/2018 224 150 - 350 K/uL Final                    Appointments  past 12m or future 3m with PCP    Date Provider   Last Visit   12/2/2020 Frantz Lemos MD   Next Visit   Visit date not found Frantz Lemos MD   ED visits in past 90 days: 0        Note composed:11:00 AM 12/16/2020

## 2020-12-16 NOTE — TELEPHONE ENCOUNTER
Encounter details (provider/department) have been updated by [facility] staff  As of this time Protocols and CDM: Does not populate or display   Updated: Department  Of note. CDM should display. medication Is delegated and encounter details have been updated  Will resend refill request encounter to P Centralized Refill Staff Pool.   [facility]   Note composed:8:56 AM 12/16/2020

## 2020-12-17 RX ORDER — CLOPIDOGREL BISULFATE 75 MG/1
TABLET ORAL
Qty: 90 TABLET | Refills: 3 | Status: SHIPPED | OUTPATIENT
Start: 2020-12-17 | End: 2021-10-20

## 2021-01-06 ENCOUNTER — IMMUNIZATION (OUTPATIENT)
Dept: FAMILY MEDICINE | Facility: CLINIC | Age: 76
End: 2021-01-06
Payer: MEDICARE

## 2021-01-06 DIAGNOSIS — Z23 NEED FOR VACCINATION: ICD-10-CM

## 2021-01-06 PROCEDURE — 91300 COVID-19, MRNA, LNP-S, PF, 30 MCG/0.3 ML DOSE VACCINE: CPT | Mod: PBBFAC | Performed by: FAMILY MEDICINE

## 2021-01-07 ENCOUNTER — OFFICE VISIT (OUTPATIENT)
Dept: FAMILY MEDICINE | Facility: CLINIC | Age: 76
End: 2021-01-07
Payer: MEDICARE

## 2021-01-07 VITALS
HEART RATE: 86 BPM | BODY MASS INDEX: 24.98 KG/M2 | WEIGHT: 155.44 LBS | DIASTOLIC BLOOD PRESSURE: 82 MMHG | HEIGHT: 66 IN | OXYGEN SATURATION: 99 % | SYSTOLIC BLOOD PRESSURE: 118 MMHG

## 2021-01-07 DIAGNOSIS — I65.22 ARTERIOSCLEROSIS OF LEFT CAROTID ARTERY: ICD-10-CM

## 2021-01-07 DIAGNOSIS — K51.019 ULCERATIVE COLITIS, UNIVERSAL, UNSPECIFIED COMPLICATION: ICD-10-CM

## 2021-01-07 DIAGNOSIS — J43.2 CENTRILOBULAR EMPHYSEMA: ICD-10-CM

## 2021-01-07 DIAGNOSIS — I70.0 ATHEROSCLEROSIS OF AORTA: ICD-10-CM

## 2021-01-07 DIAGNOSIS — F33.1 MODERATE EPISODE OF RECURRENT MAJOR DEPRESSIVE DISORDER: ICD-10-CM

## 2021-01-07 DIAGNOSIS — E78.2 MIXED HYPERLIPIDEMIA: ICD-10-CM

## 2021-01-07 DIAGNOSIS — Z00.00 ENCOUNTER FOR PREVENTIVE HEALTH EXAMINATION: Primary | ICD-10-CM

## 2021-01-07 DIAGNOSIS — I73.9 PVD (PERIPHERAL VASCULAR DISEASE): ICD-10-CM

## 2021-01-07 DIAGNOSIS — I70.212 ATHEROSCLEROSIS OF NATIVE ARTERY OF LEFT LOWER EXTREMITY WITH INTERMITTENT CLAUDICATION: ICD-10-CM

## 2021-01-07 DIAGNOSIS — D69.2 SENILE PURPURA: ICD-10-CM

## 2021-01-07 PROCEDURE — 3079F PR MOST RECENT DIASTOLIC BLOOD PRESSURE 80-89 MM HG: ICD-10-PCS | Mod: HCNC,CPTII,S$GLB, | Performed by: NURSE PRACTITIONER

## 2021-01-07 PROCEDURE — 1126F PR PAIN SEVERITY QUANTIFIED, NO PAIN PRESENT: ICD-10-PCS | Mod: HCNC,S$GLB,, | Performed by: NURSE PRACTITIONER

## 2021-01-07 PROCEDURE — 3074F PR MOST RECENT SYSTOLIC BLOOD PRESSURE < 130 MM HG: ICD-10-PCS | Mod: HCNC,CPTII,S$GLB, | Performed by: NURSE PRACTITIONER

## 2021-01-07 PROCEDURE — 3288F PR FALLS RISK ASSESSMENT DOCUMENTED: ICD-10-PCS | Mod: HCNC,CPTII,S$GLB, | Performed by: NURSE PRACTITIONER

## 2021-01-07 PROCEDURE — 1101F PR PT FALLS ASSESS DOC 0-1 FALLS W/OUT INJ PAST YR: ICD-10-PCS | Mod: HCNC,CPTII,S$GLB, | Performed by: NURSE PRACTITIONER

## 2021-01-07 PROCEDURE — 1126F AMNT PAIN NOTED NONE PRSNT: CPT | Mod: HCNC,S$GLB,, | Performed by: NURSE PRACTITIONER

## 2021-01-07 PROCEDURE — 3288F FALL RISK ASSESSMENT DOCD: CPT | Mod: HCNC,CPTII,S$GLB, | Performed by: NURSE PRACTITIONER

## 2021-01-07 PROCEDURE — G0439 PR MEDICARE ANNUAL WELLNESS SUBSEQUENT VISIT: ICD-10-PCS | Mod: HCNC,S$GLB,, | Performed by: NURSE PRACTITIONER

## 2021-01-07 PROCEDURE — 99999 PR PBB SHADOW E&M-EST. PATIENT-LVL IV: CPT | Mod: PBBFAC,HCNC,, | Performed by: NURSE PRACTITIONER

## 2021-01-07 PROCEDURE — 1101F PT FALLS ASSESS-DOCD LE1/YR: CPT | Mod: HCNC,CPTII,S$GLB, | Performed by: NURSE PRACTITIONER

## 2021-01-07 PROCEDURE — 3074F SYST BP LT 130 MM HG: CPT | Mod: HCNC,CPTII,S$GLB, | Performed by: NURSE PRACTITIONER

## 2021-01-07 PROCEDURE — 99499 UNLISTED E&M SERVICE: CPT | Mod: S$GLB,,, | Performed by: NURSE PRACTITIONER

## 2021-01-07 PROCEDURE — G0439 PPPS, SUBSEQ VISIT: HCPCS | Mod: HCNC,S$GLB,, | Performed by: NURSE PRACTITIONER

## 2021-01-07 PROCEDURE — 3079F DIAST BP 80-89 MM HG: CPT | Mod: HCNC,CPTII,S$GLB, | Performed by: NURSE PRACTITIONER

## 2021-01-07 PROCEDURE — 99499 RISK ADDL DX/OHS AUDIT: ICD-10-PCS | Mod: S$GLB,,, | Performed by: NURSE PRACTITIONER

## 2021-01-07 PROCEDURE — 99999 PR PBB SHADOW E&M-EST. PATIENT-LVL IV: ICD-10-PCS | Mod: PBBFAC,HCNC,, | Performed by: NURSE PRACTITIONER

## 2021-01-15 ENCOUNTER — PATIENT MESSAGE (OUTPATIENT)
Dept: GASTROENTEROLOGY | Facility: CLINIC | Age: 76
End: 2021-01-15

## 2021-01-15 ENCOUNTER — OFFICE VISIT (OUTPATIENT)
Dept: GASTROENTEROLOGY | Facility: CLINIC | Age: 76
End: 2021-01-15
Payer: MEDICARE

## 2021-01-15 VITALS — BODY MASS INDEX: 24.91 KG/M2 | HEIGHT: 66 IN | WEIGHT: 155 LBS

## 2021-01-15 DIAGNOSIS — K59.00 CONSTIPATION, UNSPECIFIED CONSTIPATION TYPE: Primary | ICD-10-CM

## 2021-01-15 DIAGNOSIS — Z01.818 PRE-OP TESTING: ICD-10-CM

## 2021-01-15 PROCEDURE — 99214 PR OFFICE/OUTPT VISIT, EST, LEVL IV, 30-39 MIN: ICD-10-PCS | Mod: HCNC,S$GLB,, | Performed by: INTERNAL MEDICINE

## 2021-01-15 PROCEDURE — 99214 OFFICE O/P EST MOD 30 MIN: CPT | Mod: HCNC,S$GLB,, | Performed by: INTERNAL MEDICINE

## 2021-01-15 PROCEDURE — 3288F FALL RISK ASSESSMENT DOCD: CPT | Mod: HCNC,CPTII,S$GLB, | Performed by: INTERNAL MEDICINE

## 2021-01-15 PROCEDURE — 1126F AMNT PAIN NOTED NONE PRSNT: CPT | Mod: HCNC,S$GLB,, | Performed by: INTERNAL MEDICINE

## 2021-01-15 PROCEDURE — 99999 PR PBB SHADOW E&M-EST. PATIENT-LVL III: CPT | Mod: PBBFAC,HCNC,, | Performed by: INTERNAL MEDICINE

## 2021-01-15 PROCEDURE — 1126F PR PAIN SEVERITY QUANTIFIED, NO PAIN PRESENT: ICD-10-PCS | Mod: HCNC,S$GLB,, | Performed by: INTERNAL MEDICINE

## 2021-01-15 PROCEDURE — 1159F MED LIST DOCD IN RCRD: CPT | Mod: HCNC,S$GLB,, | Performed by: INTERNAL MEDICINE

## 2021-01-15 PROCEDURE — 1101F PT FALLS ASSESS-DOCD LE1/YR: CPT | Mod: HCNC,CPTII,S$GLB, | Performed by: INTERNAL MEDICINE

## 2021-01-15 PROCEDURE — 1159F PR MEDICATION LIST DOCUMENTED IN MEDICAL RECORD: ICD-10-PCS | Mod: HCNC,S$GLB,, | Performed by: INTERNAL MEDICINE

## 2021-01-15 PROCEDURE — 3288F PR FALLS RISK ASSESSMENT DOCUMENTED: ICD-10-PCS | Mod: HCNC,CPTII,S$GLB, | Performed by: INTERNAL MEDICINE

## 2021-01-15 PROCEDURE — 99999 PR PBB SHADOW E&M-EST. PATIENT-LVL III: ICD-10-PCS | Mod: PBBFAC,HCNC,, | Performed by: INTERNAL MEDICINE

## 2021-01-15 PROCEDURE — 1101F PR PT FALLS ASSESS DOC 0-1 FALLS W/OUT INJ PAST YR: ICD-10-PCS | Mod: HCNC,CPTII,S$GLB, | Performed by: INTERNAL MEDICINE

## 2021-01-19 ENCOUNTER — LAB VISIT (OUTPATIENT)
Dept: LAB | Facility: HOSPITAL | Age: 76
End: 2021-01-19
Attending: FAMILY MEDICINE
Payer: MEDICARE

## 2021-01-19 DIAGNOSIS — K59.00 CONSTIPATION, UNSPECIFIED CONSTIPATION TYPE: ICD-10-CM

## 2021-01-19 PROCEDURE — 83993 ASSAY FOR CALPROTECTIN FECAL: CPT | Mod: HCNC

## 2021-01-25 RX ORDER — ROSUVASTATIN CALCIUM 20 MG/1
TABLET, COATED ORAL
Qty: 90 TABLET | Refills: 0 | Status: SHIPPED | OUTPATIENT
Start: 2021-01-25 | End: 2021-04-30

## 2021-01-26 LAB — CALPROTECTIN STL-MCNT: 61.1 MCG/G

## 2021-01-27 ENCOUNTER — IMMUNIZATION (OUTPATIENT)
Dept: FAMILY MEDICINE | Facility: CLINIC | Age: 76
End: 2021-01-27
Payer: MEDICARE

## 2021-01-27 DIAGNOSIS — Z23 NEED FOR VACCINATION: Primary | ICD-10-CM

## 2021-01-27 PROCEDURE — 91300 COVID-19, MRNA, LNP-S, PF, 30 MCG/0.3 ML DOSE VACCINE: CPT | Mod: PBBFAC | Performed by: FAMILY MEDICINE

## 2021-01-27 PROCEDURE — 0002A COVID-19, MRNA, LNP-S, PF, 30 MCG/0.3 ML DOSE VACCINE: CPT | Mod: PBBFAC | Performed by: FAMILY MEDICINE

## 2021-01-29 DIAGNOSIS — E78.5 HLD (HYPERLIPIDEMIA): ICD-10-CM

## 2021-01-31 RX ORDER — EZETIMIBE 10 MG/1
TABLET ORAL
Qty: 90 TABLET | Refills: 1 | Status: SHIPPED | OUTPATIENT
Start: 2021-01-31 | End: 2021-08-26

## 2021-02-10 NOTE — TELEPHONE ENCOUNTER
Patient is calling for results on CT, please advise TY   Use Therapeutic Ranged Or Therapeutic Target: please select Range or Target Target Cumulative Dosage (In Mg/Kg): 135 Include Validation In Note: Yes Next Month's Dosage: 20mg BID Xerosis Aggressive Treatment: I recommended application of Cetaphil or CeraVe numerous times a day going to bed to all dry areas. I also prescribed a topical steroid for twice daily use. Weight Units: pounds Upper Range (In Mg/Kg): 150 Kilograms Preamble Statement (Weight Entered In Details Tab): Reported Weight in kilograms: Female Completion Statement: After discussing her treatment course we decided to discontinue isotretinoin therapy at this time. I explained that she would need to continue her birth control methods for at least one month after the last dosage. She should also get a pregnancy test one month after the last dose. She shouldn't donate blood for one month after the last dose. She should call with any new symptoms of depression. Xerosis Normal Treatment: I recommended application of Cetaphil or CeraVe numerous times a day and before going to bed to all dry areas. Patient Weight (Optional But Required For Cumulative Dose-Numbers And Decimals Only): 112 Xerosis Normal Treatment: I recommended application of Cetaphil or CeraVe numerous times a day going to bed to all dry areas. Retinoid Dermatitis Aggressive Treatment: I recommended more frequent application of Cetaphil or CeraVe to the areas of dermatitis. I also prescribed a topical steroid for twice daily use until the dermatitis resolves. Nosebleeds Normal Treatment: I explained this is common when taking isotretinoin. I recommended saline mist in each nostril multiple times a day. If this worsens they will contact us. Xerosis Aggressive Treatment: I recommended application of Cetaphil or CeraVe numerous times a day and before going to bed to all dry areas. I also prescribed a topical steroid for twice daily use. Cheilitis Normal Treatment: I recommended application of Vaseline or Aquaphor numerous times a day (as often as every hour) and before going to bed. Hypertriglyceridemia Monitoring: I explained this is common when taking isotretinoin. If this worsens they will contact us. Hypercholesterolemia Monitoring: I explained this is common when taking isotretinoin. We will monitor closely. Male Completion Statement: After discussing his treatment course we decided to discontinue isotretinoin therapy at this time. He shouldn't donate blood for one month after the last dose. He should call with any new symptoms of depression. Brand Name (Optional): nexplanon Cheilitis Aggressive Treatment: I recommended application of Vaseline or Aquaphor numerous times a day (as often as every hour) and before going to bed. I also prescribed a topical steroid for twice daily use. Completed Therapy?: No Headache Monitoring: I recommended monitoring the headaches for now. There is no evidence of increased intracranial pressure. They were instructed to call if the headaches are worsening. What Is The Patient's Gender: Female Lower Range (In Mg/Kg): 120 Counseling Text: I reviewed the side effect in detail. Patient should get monthly blood tests, not donate blood, not drive at night if vision affected, and not share medication. Detail Level: Zone Months Of Therapy Completed: 1 Hypertriglyceridemia Treatment: I explained this is common when taking isotretinoin. If this worsens they will contact us. They may try OTC ibuprofen. Retinoid Dermatitis Normal Treatment: I recommended more frequent application of Cetaphil or CeraVe to the areas of dermatitis. Female Pregnancy Counseling Text: Female patients should also be on two forms of birth control while taking this medication and for one month after their last dose. Is Xerosis Present?: Yes - Normal Treatment Pounds Preamble Statement (Weight Entered In Details Tab): Reported Weight in pounds: Dosing Month 1 (Required For Cumulative Dosing): 20mg Daily Are Labs Available For Review?: No- Pending

## 2021-02-17 ENCOUNTER — LAB VISIT (OUTPATIENT)
Dept: FAMILY MEDICINE | Facility: CLINIC | Age: 76
End: 2021-02-17
Payer: MEDICARE

## 2021-02-17 DIAGNOSIS — Z01.818 PRE-OP TESTING: ICD-10-CM

## 2021-02-17 PROCEDURE — U0003 INFECTIOUS AGENT DETECTION BY NUCLEIC ACID (DNA OR RNA); SEVERE ACUTE RESPIRATORY SYNDROME CORONAVIRUS 2 (SARS-COV-2) (CORONAVIRUS DISEASE [COVID-19]), AMPLIFIED PROBE TECHNIQUE, MAKING USE OF HIGH THROUGHPUT TECHNOLOGIES AS DESCRIBED BY CMS-2020-01-R: HCPCS

## 2021-02-17 PROCEDURE — U0005 INFEC AGEN DETEC AMPLI PROBE: HCPCS

## 2021-02-18 ENCOUNTER — ANESTHESIA EVENT (OUTPATIENT)
Dept: ENDOSCOPY | Facility: HOSPITAL | Age: 76
End: 2021-02-18
Payer: MEDICARE

## 2021-02-18 LAB — SARS-COV-2 RNA RESP QL NAA+PROBE: NOT DETECTED

## 2021-02-18 RX ORDER — SODIUM CHLORIDE 0.9 % (FLUSH) 0.9 %
10 SYRINGE (ML) INJECTION EVERY 6 HOURS PRN
Status: CANCELLED | OUTPATIENT
Start: 2021-02-18

## 2021-02-18 RX ORDER — SODIUM CHLORIDE, SODIUM LACTATE, POTASSIUM CHLORIDE, CALCIUM CHLORIDE 600; 310; 30; 20 MG/100ML; MG/100ML; MG/100ML; MG/100ML
INJECTION, SOLUTION INTRAVENOUS CONTINUOUS
Status: CANCELLED | OUTPATIENT
Start: 2021-02-18

## 2021-02-19 ENCOUNTER — ANESTHESIA (OUTPATIENT)
Dept: ENDOSCOPY | Facility: HOSPITAL | Age: 76
End: 2021-02-19
Payer: MEDICARE

## 2021-02-19 ENCOUNTER — HOSPITAL ENCOUNTER (OUTPATIENT)
Facility: HOSPITAL | Age: 76
Discharge: HOME | End: 2021-02-19
Attending: INTERNAL MEDICINE | Admitting: INTERNAL MEDICINE
Payer: MEDICARE

## 2021-02-19 VITALS
DIASTOLIC BLOOD PRESSURE: 59 MMHG | HEART RATE: 73 BPM | WEIGHT: 145 LBS | OXYGEN SATURATION: 98 % | RESPIRATION RATE: 16 BRPM | BODY MASS INDEX: 23.3 KG/M2 | SYSTOLIC BLOOD PRESSURE: 113 MMHG | HEIGHT: 66 IN | TEMPERATURE: 97 F

## 2021-02-19 DIAGNOSIS — K59.00 CONSTIPATION, UNSPECIFIED CONSTIPATION TYPE: Primary | ICD-10-CM

## 2021-02-19 PROCEDURE — D9220A PRA ANESTHESIA: ICD-10-PCS | Mod: ANES,,, | Performed by: ANESTHESIOLOGY

## 2021-02-19 PROCEDURE — D9220A PRA ANESTHESIA: ICD-10-PCS | Mod: CRNA,,, | Performed by: NURSE ANESTHETIST, CERTIFIED REGISTERED

## 2021-02-19 PROCEDURE — D9220A PRA ANESTHESIA: Mod: ANES,,, | Performed by: ANESTHESIOLOGY

## 2021-02-19 PROCEDURE — 88305 TISSUE EXAM BY PATHOLOGIST: CPT | Mod: 26,,, | Performed by: PATHOLOGY

## 2021-02-19 PROCEDURE — 27201089 HC SNARE, DISP (ANY): Mod: PO | Performed by: INTERNAL MEDICINE

## 2021-02-19 PROCEDURE — 88305 TISSUE EXAM BY PATHOLOGIST: ICD-10-PCS | Mod: 26,,, | Performed by: PATHOLOGY

## 2021-02-19 PROCEDURE — 45380 PR COLONOSCOPY,BIOPSY: ICD-10-PCS | Mod: 59,,, | Performed by: INTERNAL MEDICINE

## 2021-02-19 PROCEDURE — 88305 TISSUE EXAM BY PATHOLOGIST: CPT | Performed by: PATHOLOGY

## 2021-02-19 PROCEDURE — 63600175 PHARM REV CODE 636 W HCPCS: Mod: PO | Performed by: INTERNAL MEDICINE

## 2021-02-19 PROCEDURE — 45385 COLONOSCOPY W/LESION REMOVAL: CPT | Mod: PO | Performed by: INTERNAL MEDICINE

## 2021-02-19 PROCEDURE — 25000003 PHARM REV CODE 250: Mod: PO | Performed by: NURSE ANESTHETIST, CERTIFIED REGISTERED

## 2021-02-19 PROCEDURE — 63600175 PHARM REV CODE 636 W HCPCS: Mod: PO | Performed by: NURSE ANESTHETIST, CERTIFIED REGISTERED

## 2021-02-19 PROCEDURE — 45385 PR COLONOSCOPY,REMV LESN,SNARE: ICD-10-PCS | Mod: ,,, | Performed by: INTERNAL MEDICINE

## 2021-02-19 PROCEDURE — 25000003 PHARM REV CODE 250: Mod: PO | Performed by: INTERNAL MEDICINE

## 2021-02-19 PROCEDURE — D9220A PRA ANESTHESIA: Mod: CRNA,,, | Performed by: NURSE ANESTHETIST, CERTIFIED REGISTERED

## 2021-02-19 PROCEDURE — 37000008 HC ANESTHESIA 1ST 15 MINUTES: Mod: PO | Performed by: INTERNAL MEDICINE

## 2021-02-19 PROCEDURE — 37000009 HC ANESTHESIA EA ADD 15 MINS: Mod: PO | Performed by: INTERNAL MEDICINE

## 2021-02-19 PROCEDURE — 45380 COLONOSCOPY AND BIOPSY: CPT | Mod: 59,,, | Performed by: INTERNAL MEDICINE

## 2021-02-19 PROCEDURE — 45385 COLONOSCOPY W/LESION REMOVAL: CPT | Mod: ,,, | Performed by: INTERNAL MEDICINE

## 2021-02-19 RX ORDER — LIDOCAINE HCL/PF 100 MG/5ML
SYRINGE (ML) INTRAVENOUS
Status: DISCONTINUED | OUTPATIENT
Start: 2021-02-19 | End: 2021-02-19

## 2021-02-19 RX ORDER — PROPOFOL 10 MG/ML
VIAL (ML) INTRAVENOUS
Status: DISCONTINUED | OUTPATIENT
Start: 2021-02-19 | End: 2021-02-19

## 2021-02-19 RX ORDER — SODIUM CHLORIDE, SODIUM LACTATE, POTASSIUM CHLORIDE, CALCIUM CHLORIDE 600; 310; 30; 20 MG/100ML; MG/100ML; MG/100ML; MG/100ML
INJECTION, SOLUTION INTRAVENOUS CONTINUOUS
Status: DISCONTINUED | OUTPATIENT
Start: 2021-02-19 | End: 2021-02-19 | Stop reason: HOSPADM

## 2021-02-19 RX ORDER — PROPOFOL 10 MG/ML
VIAL (ML) INTRAVENOUS CONTINUOUS PRN
Status: DISCONTINUED | OUTPATIENT
Start: 2021-02-19 | End: 2021-02-19

## 2021-02-19 RX ADMIN — PROPOFOL 100 MG: 10 INJECTION, EMULSION INTRAVENOUS at 10:02

## 2021-02-19 RX ADMIN — PROPOFOL 150 MCG/KG/MIN: 10 INJECTION, EMULSION INTRAVENOUS at 10:02

## 2021-02-19 RX ADMIN — SODIUM CHLORIDE, SODIUM LACTATE, POTASSIUM CHLORIDE, AND CALCIUM CHLORIDE: .6; .31; .03; .02 INJECTION, SOLUTION INTRAVENOUS at 10:02

## 2021-02-19 RX ADMIN — SODIUM PHOSPHATE, DIBASIC AND SODIUM PHOSPHATE, MONOBASIC 2 ENEMA: 7; 19 ENEMA RECTAL at 09:02

## 2021-02-19 RX ADMIN — LIDOCAINE HYDROCHLORIDE 100 MG: 20 INJECTION, SOLUTION INTRAVENOUS at 10:02

## 2021-02-23 LAB
FINAL PATHOLOGIC DIAGNOSIS: NORMAL
GROSS: NORMAL
Lab: NORMAL

## 2021-03-24 ENCOUNTER — HOSPITAL ENCOUNTER (OUTPATIENT)
Dept: RADIOLOGY | Facility: HOSPITAL | Age: 76
Discharge: HOME | End: 2021-03-24
Attending: INTERNAL MEDICINE
Payer: MEDICARE

## 2021-03-24 DIAGNOSIS — R91.8 LUNG NODULES: ICD-10-CM

## 2021-03-24 PROCEDURE — 71250 CT CHEST WITHOUT CONTRAST: ICD-10-PCS | Mod: 26,,, | Performed by: RADIOLOGY

## 2021-03-24 PROCEDURE — 71250 CT THORAX DX C-: CPT | Mod: 26,,, | Performed by: RADIOLOGY

## 2021-03-24 PROCEDURE — 71250 CT THORAX DX C-: CPT | Mod: TC,PO

## 2021-04-21 PROBLEM — R93.89 ABNORMAL CT OF THE CHEST: Status: ACTIVE | Noted: 2021-04-21

## 2021-04-22 ENCOUNTER — PATIENT MESSAGE (OUTPATIENT)
Dept: GASTROENTEROLOGY | Facility: CLINIC | Age: 76
End: 2021-04-22

## 2021-04-23 ENCOUNTER — PATIENT MESSAGE (OUTPATIENT)
Dept: GASTROENTEROLOGY | Facility: CLINIC | Age: 76
End: 2021-04-23

## 2021-04-28 DIAGNOSIS — E78.2 MIXED HYPERLIPIDEMIA: Primary | ICD-10-CM

## 2021-04-30 RX ORDER — ROSUVASTATIN CALCIUM 20 MG/1
TABLET, COATED ORAL
Qty: 90 TABLET | Refills: 0 | Status: SHIPPED | OUTPATIENT
Start: 2021-04-30 | End: 2021-07-26

## 2021-05-04 ENCOUNTER — PATIENT MESSAGE (OUTPATIENT)
Dept: FAMILY MEDICINE | Facility: CLINIC | Age: 76
End: 2021-05-04

## 2021-05-11 ENCOUNTER — LAB VISIT (OUTPATIENT)
Dept: LAB | Facility: HOSPITAL | Age: 76
End: 2021-05-11
Attending: FAMILY MEDICINE
Payer: MEDICARE

## 2021-05-11 DIAGNOSIS — E78.2 MIXED HYPERLIPIDEMIA: ICD-10-CM

## 2021-05-11 PROCEDURE — 80061 LIPID PANEL: CPT | Mod: HCNC | Performed by: FAMILY MEDICINE

## 2021-05-11 PROCEDURE — 80053 COMPREHEN METABOLIC PANEL: CPT | Mod: HCNC | Performed by: FAMILY MEDICINE

## 2021-05-11 PROCEDURE — 36415 COLL VENOUS BLD VENIPUNCTURE: CPT | Mod: HCNC,PO | Performed by: FAMILY MEDICINE

## 2021-05-12 LAB
ALBUMIN SERPL BCP-MCNC: 3.8 G/DL (ref 3.5–5.2)
ALP SERPL-CCNC: 66 U/L (ref 55–135)
ALT SERPL W/O P-5'-P-CCNC: 21 U/L (ref 10–44)
ANION GAP SERPL CALC-SCNC: 8 MMOL/L (ref 8–16)
AST SERPL-CCNC: 18 U/L (ref 10–40)
BILIRUB SERPL-MCNC: 0.4 MG/DL (ref 0.1–1)
BUN SERPL-MCNC: 13 MG/DL (ref 8–23)
CALCIUM SERPL-MCNC: 10.2 MG/DL (ref 8.7–10.5)
CHLORIDE SERPL-SCNC: 105 MMOL/L (ref 95–110)
CHOLEST SERPL-MCNC: 130 MG/DL (ref 120–199)
CHOLEST/HDLC SERPL: 2.4 {RATIO} (ref 2–5)
CO2 SERPL-SCNC: 29 MMOL/L (ref 23–29)
CREAT SERPL-MCNC: 1 MG/DL (ref 0.5–1.4)
EST. GFR  (AFRICAN AMERICAN): >60 ML/MIN/1.73 M^2
EST. GFR  (NON AFRICAN AMERICAN): >60 ML/MIN/1.73 M^2
GLUCOSE SERPL-MCNC: 87 MG/DL (ref 70–110)
HDLC SERPL-MCNC: 54 MG/DL (ref 40–75)
HDLC SERPL: 41.5 % (ref 20–50)
LDLC SERPL CALC-MCNC: 59.6 MG/DL (ref 63–159)
NONHDLC SERPL-MCNC: 76 MG/DL
POTASSIUM SERPL-SCNC: 4.5 MMOL/L (ref 3.5–5.1)
PROT SERPL-MCNC: 7.3 G/DL (ref 6–8.4)
SODIUM SERPL-SCNC: 142 MMOL/L (ref 136–145)
TRIGL SERPL-MCNC: 82 MG/DL (ref 30–150)

## 2021-05-26 ENCOUNTER — TELEPHONE (OUTPATIENT)
Dept: FAMILY MEDICINE | Facility: CLINIC | Age: 76
End: 2021-05-26

## 2021-06-14 ENCOUNTER — OFFICE VISIT (OUTPATIENT)
Dept: URBAN - METROPOLITAN AREA CLINIC 54 | Facility: CLINIC | Age: 76
End: 2021-06-14

## 2021-07-26 RX ORDER — ROSUVASTATIN CALCIUM 20 MG/1
TABLET, COATED ORAL
Qty: 90 TABLET | Refills: 1 | Status: SHIPPED | OUTPATIENT
Start: 2021-07-26 | End: 2021-11-05

## 2021-08-02 ENCOUNTER — TELEPHONE (OUTPATIENT)
Dept: FAMILY MEDICINE | Facility: CLINIC | Age: 76
End: 2021-08-02

## 2021-08-02 DIAGNOSIS — L98.9 SKIN LESION: Primary | ICD-10-CM

## 2021-08-03 ENCOUNTER — TELEPHONE (OUTPATIENT)
Dept: DERMATOLOGY | Facility: CLINIC | Age: 76
End: 2021-08-03

## 2021-08-04 ENCOUNTER — TELEPHONE (OUTPATIENT)
Dept: FAMILY MEDICINE | Facility: CLINIC | Age: 76
End: 2021-08-04

## 2021-08-04 DIAGNOSIS — D22.9 ATYPICAL MOLE: Primary | ICD-10-CM

## 2021-08-05 ENCOUNTER — TELEPHONE (OUTPATIENT)
Dept: DERMATOLOGY | Facility: CLINIC | Age: 76
End: 2021-08-05

## 2021-08-13 ENCOUNTER — TELEPHONE (OUTPATIENT)
Dept: FAMILY MEDICINE | Facility: CLINIC | Age: 76
End: 2021-08-13

## 2021-08-13 DIAGNOSIS — Z20.822 ENCOUNTER FOR LABORATORY TESTING FOR COVID-19 VIRUS: ICD-10-CM

## 2021-08-26 DIAGNOSIS — E78.5 HLD (HYPERLIPIDEMIA): ICD-10-CM

## 2021-08-26 RX ORDER — EZETIMIBE 10 MG/1
TABLET ORAL
Qty: 90 TABLET | Refills: 1 | Status: SHIPPED | OUTPATIENT
Start: 2021-08-26 | End: 2022-01-13 | Stop reason: SDUPTHER

## 2021-10-01 ENCOUNTER — OFFICE VISIT (OUTPATIENT)
Dept: FAMILY MEDICINE | Facility: CLINIC | Age: 76
End: 2021-10-01
Payer: MEDICARE

## 2021-10-01 VITALS
HEIGHT: 66 IN | BODY MASS INDEX: 25.44 KG/M2 | OXYGEN SATURATION: 97 % | WEIGHT: 158.31 LBS | SYSTOLIC BLOOD PRESSURE: 138 MMHG | HEART RATE: 90 BPM | DIASTOLIC BLOOD PRESSURE: 74 MMHG

## 2021-10-01 DIAGNOSIS — G47.33 OSA ON CPAP: ICD-10-CM

## 2021-10-01 DIAGNOSIS — R49.0 HOARSENESS: ICD-10-CM

## 2021-10-01 DIAGNOSIS — J44.1 COPD EXACERBATION: Primary | ICD-10-CM

## 2021-10-01 PROCEDURE — 3078F DIAST BP <80 MM HG: CPT | Mod: HCNC,CPTII,S$GLB, | Performed by: FAMILY MEDICINE

## 2021-10-01 PROCEDURE — 1101F PR PT FALLS ASSESS DOC 0-1 FALLS W/OUT INJ PAST YR: ICD-10-PCS | Mod: HCNC,CPTII,S$GLB, | Performed by: FAMILY MEDICINE

## 2021-10-01 PROCEDURE — 3078F PR MOST RECENT DIASTOLIC BLOOD PRESSURE < 80 MM HG: ICD-10-PCS | Mod: HCNC,CPTII,S$GLB, | Performed by: FAMILY MEDICINE

## 2021-10-01 PROCEDURE — 1126F PR PAIN SEVERITY QUANTIFIED, NO PAIN PRESENT: ICD-10-PCS | Mod: HCNC,CPTII,S$GLB, | Performed by: FAMILY MEDICINE

## 2021-10-01 PROCEDURE — 3075F PR MOST RECENT SYSTOLIC BLOOD PRESS GE 130-139MM HG: ICD-10-PCS | Mod: HCNC,CPTII,S$GLB, | Performed by: FAMILY MEDICINE

## 2021-10-01 PROCEDURE — 99999 PR PBB SHADOW E&M-EST. PATIENT-LVL V: CPT | Mod: PBBFAC,HCNC,, | Performed by: FAMILY MEDICINE

## 2021-10-01 PROCEDURE — 99499 UNLISTED E&M SERVICE: CPT | Mod: HCNC,S$GLB,, | Performed by: FAMILY MEDICINE

## 2021-10-01 PROCEDURE — 1159F PR MEDICATION LIST DOCUMENTED IN MEDICAL RECORD: ICD-10-PCS | Mod: HCNC,CPTII,S$GLB, | Performed by: FAMILY MEDICINE

## 2021-10-01 PROCEDURE — 1160F PR REVIEW ALL MEDS BY PRESCRIBER/CLIN PHARMACIST DOCUMENTED: ICD-10-PCS | Mod: HCNC,CPTII,S$GLB, | Performed by: FAMILY MEDICINE

## 2021-10-01 PROCEDURE — 1101F PT FALLS ASSESS-DOCD LE1/YR: CPT | Mod: HCNC,CPTII,S$GLB, | Performed by: FAMILY MEDICINE

## 2021-10-01 PROCEDURE — 99214 PR OFFICE/OUTPT VISIT, EST, LEVL IV, 30-39 MIN: ICD-10-PCS | Mod: HCNC,S$GLB,, | Performed by: FAMILY MEDICINE

## 2021-10-01 PROCEDURE — 1126F AMNT PAIN NOTED NONE PRSNT: CPT | Mod: HCNC,CPTII,S$GLB, | Performed by: FAMILY MEDICINE

## 2021-10-01 PROCEDURE — 3288F PR FALLS RISK ASSESSMENT DOCUMENTED: ICD-10-PCS | Mod: HCNC,CPTII,S$GLB, | Performed by: FAMILY MEDICINE

## 2021-10-01 PROCEDURE — 99214 OFFICE O/P EST MOD 30 MIN: CPT | Mod: HCNC,S$GLB,, | Performed by: FAMILY MEDICINE

## 2021-10-01 PROCEDURE — 3288F FALL RISK ASSESSMENT DOCD: CPT | Mod: HCNC,CPTII,S$GLB, | Performed by: FAMILY MEDICINE

## 2021-10-01 PROCEDURE — 1159F MED LIST DOCD IN RCRD: CPT | Mod: HCNC,CPTII,S$GLB, | Performed by: FAMILY MEDICINE

## 2021-10-01 PROCEDURE — 99499 RISK ADDL DX/OHS AUDIT: ICD-10-PCS | Mod: HCNC,S$GLB,, | Performed by: FAMILY MEDICINE

## 2021-10-01 PROCEDURE — 1160F RVW MEDS BY RX/DR IN RCRD: CPT | Mod: HCNC,CPTII,S$GLB, | Performed by: FAMILY MEDICINE

## 2021-10-01 PROCEDURE — 99999 PR PBB SHADOW E&M-EST. PATIENT-LVL V: ICD-10-PCS | Mod: PBBFAC,HCNC,, | Performed by: FAMILY MEDICINE

## 2021-10-01 PROCEDURE — 3075F SYST BP GE 130 - 139MM HG: CPT | Mod: HCNC,CPTII,S$GLB, | Performed by: FAMILY MEDICINE

## 2021-10-01 RX ORDER — DEXAMETHASONE SODIUM PHOSPHATE 100 MG/10ML
INJECTION INTRAMUSCULAR; INTRAVENOUS
COMMUNITY
End: 2021-10-01

## 2021-10-01 RX ORDER — PREDNISONE 10 MG/1
TABLET ORAL
Qty: 20 TABLET | Refills: 0 | Status: SHIPPED | OUTPATIENT
Start: 2021-10-01 | End: 2022-01-13

## 2021-10-05 ENCOUNTER — IMMUNIZATION (OUTPATIENT)
Dept: FAMILY MEDICINE | Facility: CLINIC | Age: 76
End: 2021-10-05
Payer: MEDICARE

## 2021-10-05 DIAGNOSIS — Z23 NEED FOR VACCINATION: Primary | ICD-10-CM

## 2021-10-05 PROCEDURE — 91300 COVID-19, MRNA, LNP-S, PF, 30 MCG/0.3 ML DOSE VACCINE: CPT | Mod: HCNC,PBBFAC | Performed by: RADIOLOGY

## 2021-11-05 RX ORDER — ROSUVASTATIN CALCIUM 20 MG/1
TABLET, COATED ORAL
Qty: 90 TABLET | Refills: 1 | Status: SHIPPED | OUTPATIENT
Start: 2021-11-05 | End: 2022-01-13 | Stop reason: SDUPTHER

## 2022-01-13 ENCOUNTER — OFFICE VISIT (OUTPATIENT)
Dept: FAMILY MEDICINE | Facility: CLINIC | Age: 77
End: 2022-01-13
Payer: MEDICARE

## 2022-01-13 ENCOUNTER — LAB VISIT (OUTPATIENT)
Dept: LAB | Facility: HOSPITAL | Age: 77
End: 2022-01-13
Attending: FAMILY MEDICINE
Payer: MEDICARE

## 2022-01-13 VITALS
HEART RATE: 96 BPM | DIASTOLIC BLOOD PRESSURE: 74 MMHG | BODY MASS INDEX: 25.44 KG/M2 | HEIGHT: 66 IN | SYSTOLIC BLOOD PRESSURE: 122 MMHG | WEIGHT: 158.31 LBS | OXYGEN SATURATION: 99 %

## 2022-01-13 DIAGNOSIS — E78.5 HLD (HYPERLIPIDEMIA): ICD-10-CM

## 2022-01-13 DIAGNOSIS — D69.2 SENILE PURPURA: ICD-10-CM

## 2022-01-13 DIAGNOSIS — Z00.00 ENCOUNTER FOR PREVENTIVE HEALTH EXAMINATION: Primary | ICD-10-CM

## 2022-01-13 DIAGNOSIS — I70.213 ATHEROSCLEROSIS OF NATIVE ARTERIES OF EXTREMITIES WITH INTERMITTENT CLAUDICATION, BILATERAL LEGS: ICD-10-CM

## 2022-01-13 DIAGNOSIS — Z00.00 ENCOUNTER FOR PREVENTIVE HEALTH EXAMINATION: ICD-10-CM

## 2022-01-13 DIAGNOSIS — K51.019 ULCERATIVE COLITIS, UNIVERSAL, UNSPECIFIED COMPLICATION: ICD-10-CM

## 2022-01-13 DIAGNOSIS — Z12.5 ENCOUNTER FOR SCREENING FOR MALIGNANT NEOPLASM OF PROSTATE: ICD-10-CM

## 2022-01-13 DIAGNOSIS — J44.1 COPD EXACERBATION: ICD-10-CM

## 2022-01-13 DIAGNOSIS — F33.9 RECURRENT MAJOR DEPRESSIVE DISORDER, REMISSION STATUS UNSPECIFIED: ICD-10-CM

## 2022-01-13 LAB — COMPLEXED PSA SERPL-MCNC: 5.6 NG/ML (ref 0–4)

## 2022-01-13 PROCEDURE — 1159F MED LIST DOCD IN RCRD: CPT | Mod: HCNC,CPTII,S$GLB, | Performed by: NURSE PRACTITIONER

## 2022-01-13 PROCEDURE — 3078F PR MOST RECENT DIASTOLIC BLOOD PRESSURE < 80 MM HG: ICD-10-PCS | Mod: HCNC,CPTII,S$GLB, | Performed by: NURSE PRACTITIONER

## 2022-01-13 PROCEDURE — 1101F PT FALLS ASSESS-DOCD LE1/YR: CPT | Mod: HCNC,CPTII,S$GLB, | Performed by: NURSE PRACTITIONER

## 2022-01-13 PROCEDURE — 3074F SYST BP LT 130 MM HG: CPT | Mod: HCNC,CPTII,S$GLB, | Performed by: NURSE PRACTITIONER

## 2022-01-13 PROCEDURE — 1160F PR REVIEW ALL MEDS BY PRESCRIBER/CLIN PHARMACIST DOCUMENTED: ICD-10-PCS | Mod: HCNC,CPTII,S$GLB, | Performed by: NURSE PRACTITIONER

## 2022-01-13 PROCEDURE — 1159F PR MEDICATION LIST DOCUMENTED IN MEDICAL RECORD: ICD-10-PCS | Mod: HCNC,CPTII,S$GLB, | Performed by: NURSE PRACTITIONER

## 2022-01-13 PROCEDURE — G0439 PR MEDICARE ANNUAL WELLNESS SUBSEQUENT VISIT: ICD-10-PCS | Mod: HCNC,S$GLB,, | Performed by: NURSE PRACTITIONER

## 2022-01-13 PROCEDURE — 1160F RVW MEDS BY RX/DR IN RCRD: CPT | Mod: HCNC,CPTII,S$GLB, | Performed by: NURSE PRACTITIONER

## 2022-01-13 PROCEDURE — 36415 COLL VENOUS BLD VENIPUNCTURE: CPT | Mod: HCNC,PO | Performed by: NURSE PRACTITIONER

## 2022-01-13 PROCEDURE — G0008 FLU VACCINE - QUADRIVALENT - ADJUVANTED: ICD-10-PCS | Mod: HCNC,S$GLB,, | Performed by: NURSE PRACTITIONER

## 2022-01-13 PROCEDURE — 3288F PR FALLS RISK ASSESSMENT DOCUMENTED: ICD-10-PCS | Mod: HCNC,CPTII,S$GLB, | Performed by: NURSE PRACTITIONER

## 2022-01-13 PROCEDURE — 3078F DIAST BP <80 MM HG: CPT | Mod: HCNC,CPTII,S$GLB, | Performed by: NURSE PRACTITIONER

## 2022-01-13 PROCEDURE — 3288F FALL RISK ASSESSMENT DOCD: CPT | Mod: HCNC,CPTII,S$GLB, | Performed by: NURSE PRACTITIONER

## 2022-01-13 PROCEDURE — 3074F PR MOST RECENT SYSTOLIC BLOOD PRESSURE < 130 MM HG: ICD-10-PCS | Mod: HCNC,CPTII,S$GLB, | Performed by: NURSE PRACTITIONER

## 2022-01-13 PROCEDURE — 99999 PR PBB SHADOW E&M-EST. PATIENT-LVL IV: CPT | Mod: PBBFAC,HCNC,, | Performed by: NURSE PRACTITIONER

## 2022-01-13 PROCEDURE — 99499 UNLISTED E&M SERVICE: CPT | Mod: S$GLB,,, | Performed by: NURSE PRACTITIONER

## 2022-01-13 PROCEDURE — 90694 FLU VACCINE - QUADRIVALENT - ADJUVANTED: ICD-10-PCS | Mod: HCNC,S$GLB,, | Performed by: NURSE PRACTITIONER

## 2022-01-13 PROCEDURE — G0439 PPPS, SUBSEQ VISIT: HCPCS | Mod: HCNC,S$GLB,, | Performed by: NURSE PRACTITIONER

## 2022-01-13 PROCEDURE — 1101F PR PT FALLS ASSESS DOC 0-1 FALLS W/OUT INJ PAST YR: ICD-10-PCS | Mod: HCNC,CPTII,S$GLB, | Performed by: NURSE PRACTITIONER

## 2022-01-13 PROCEDURE — 1170F FXNL STATUS ASSESSED: CPT | Mod: HCNC,CPTII,S$GLB, | Performed by: NURSE PRACTITIONER

## 2022-01-13 PROCEDURE — 1170F PR FUNCTIONAL STATUS ASSESSED: ICD-10-PCS | Mod: HCNC,CPTII,S$GLB, | Performed by: NURSE PRACTITIONER

## 2022-01-13 PROCEDURE — 99999 PR PBB SHADOW E&M-EST. PATIENT-LVL IV: ICD-10-PCS | Mod: PBBFAC,HCNC,, | Performed by: NURSE PRACTITIONER

## 2022-01-13 PROCEDURE — 84153 ASSAY OF PSA TOTAL: CPT | Mod: HCNC | Performed by: NURSE PRACTITIONER

## 2022-01-13 PROCEDURE — 1126F PR PAIN SEVERITY QUANTIFIED, NO PAIN PRESENT: ICD-10-PCS | Mod: HCNC,CPTII,S$GLB, | Performed by: NURSE PRACTITIONER

## 2022-01-13 PROCEDURE — 1126F AMNT PAIN NOTED NONE PRSNT: CPT | Mod: HCNC,CPTII,S$GLB, | Performed by: NURSE PRACTITIONER

## 2022-01-13 PROCEDURE — G0008 ADMIN INFLUENZA VIRUS VAC: HCPCS | Mod: HCNC,S$GLB,, | Performed by: NURSE PRACTITIONER

## 2022-01-13 PROCEDURE — 99499 RISK ADDL DX/OHS AUDIT: ICD-10-PCS | Mod: S$GLB,,, | Performed by: NURSE PRACTITIONER

## 2022-01-13 PROCEDURE — 90694 VACC AIIV4 NO PRSRV 0.5ML IM: CPT | Mod: HCNC,S$GLB,, | Performed by: NURSE PRACTITIONER

## 2022-01-13 RX ORDER — EZETIMIBE 10 MG/1
10 TABLET ORAL DAILY
Qty: 90 TABLET | Refills: 3 | Status: SHIPPED | OUTPATIENT
Start: 2022-01-13 | End: 2023-01-18

## 2022-01-13 RX ORDER — SERTRALINE HYDROCHLORIDE 50 MG/1
TABLET, FILM COATED ORAL
COMMUNITY
Start: 2021-08-29

## 2022-01-13 RX ORDER — ROSUVASTATIN CALCIUM 20 MG/1
20 TABLET, COATED ORAL DAILY
Qty: 90 TABLET | Refills: 3 | Status: SHIPPED | OUTPATIENT
Start: 2022-01-13 | End: 2023-01-03

## 2022-01-13 NOTE — PATIENT INSTRUCTIONS
Counseling and Referral of Other Preventative  (Italic type indicates deductible and co-insurance are waived)    Patient Name: Castillo Anderson  Today's Date: 1/13/2022    Health Maintenance       Date Due Completion Date    Shingles Vaccine (2 of 3) 11/26/2015 10/1/2015    Influenza Vaccine (1) 09/01/2021 11/20/2020    Override on 11/13/2019: Done ([place])    LDCT Lung Screen 03/24/2022 3/24/2021    Aspirin/Antiplatelet Therapy 01/13/2023 1/13/2022    Colonoscopy 02/19/2024 2/19/2021    Lipid Panel 05/11/2026 5/11/2021    TETANUS VACCINE 09/29/2026 9/29/2016 (Declined)    Override on 9/29/2016: Declined        No orders of the defined types were placed in this encounter.    The following information is provided to all patients.  This information is to help you find resources for any of the problems found today that may be affecting your health:                Living healthy guide: www.[facility].[place].gov      Understanding Diabetes: www.diabetes.org      Eating healthy: www.cdc.gov/healthyweight      [facility] home safety checklist: www.cdc.gov/steadi/patient.html      Agency on Aging: www.goea.[place].gov      Alcoholics anonymous (AA): www.aa.org      Physical Activity: www.pravin.nih.gov/bs8peeh      Tobacco use: www.quitwithusla.org

## 2022-01-13 NOTE — PROGRESS NOTES
"  Castillo Anderson presented for a  Medicare AWV and comprehensive Health Risk Assessment today. The following components were reviewed and updated:    · Medical history  · Family History  · Social history  · Allergies and Current Medications  · Health Risk Assessment  · Health Maintenance  · Care Team     ** See Completed Assessments for Annual Wellness Visit within the encounter summary.**     The following assessments were completed:  · Living Situation  · CAGE  · Depression Screening  · Timed Get Up and Go  · Whisper Test  · Cognitive Function Screening  · Nutrition Screening  · ADL Screening  · PAQ Screening    Vitals:    01/13/22 0958   BP: 122/74   BP Location: Left arm   Patient Position: Sitting   BP Method: Medium (Manual)   Pulse: 96   SpO2: 99%   Weight: 71.8 kg (158 lb 4.6 oz)   Height: 5' 6" (1.676 m)     Body mass index is 25.55 kg/m².  Physical Exam  Vitals reviewed.   Constitutional:       Appearance: Normal appearance.   Cardiovascular:      Rate and Rhythm: Normal rate and regular rhythm.      Pulses: Normal pulses.      Heart sounds: Normal heart sounds.   Pulmonary:      Effort: Pulmonary effort is normal.      Breath sounds: Normal breath sounds.   Skin:     General: Skin is warm and dry.   Neurological:      Mental Status: He is alert and oriented to person, place, and time.       Diagnoses and health risks identified today and associated recommendations/orders:    1. Encounter for preventive health examination  Reviewed and discussed health maintenance.    - PSA, Screening; Future    2. HLD (hyperlipidemia)  Stable- continue current treatment and follow up routinely with PCP   - ezetimibe (ZETIA) 10 mg tablet; Take 1 tablet (10 mg total) by mouth once daily.  Dispense: 90 tablet; Refill: 3    3. Encounter for screening for malignant neoplasm of prostate   - PSA, Screening; Future    4. Ulcerative colitis, universal, unspecified complication  Stable- continue current treatment and follow up " routinely with PCP     5. Senile purpura  Stable- continue current treatment and follow up routinely with PCP     6. Recurrent major depressive disorder, remission status unspecified  Stable- continue current treatment and follow up routinely with PCP     7. Atherosclerosis of native arteries of extremities with intermittent claudication, bilateral legs  Stable- continue current treatment and follow up routinely with PCP     8. COPD exacerbation  Stable- continue current treatment and follow up routinely with PCP and pulmonary ()    Provided Castillo with a 5-10 year written screening schedule and personal prevention plan. Recommendations were developed using the USPSTF age appropriate recommendations. Education, counseling, and referrals were provided as needed. After Visit Summary printed and given to patient which includes a list of additional screenings\tests needed.    I offered to discuss advanced care planning, including how to pick a person who would make decisions for you if you were unable to make them for yourself, called a health care power of , and what kind of decisions you might make such as use of life sustaining treatments such as ventilators and tube feeding when faced with a life limiting illness recorded on a living will that they will need to know. (How you want to be cared for as you near the end of your natural life)     X Patient is interested in learning more about how to make advanced directives.  I provided them paperwork and offered to discuss this with them.    Cindi Wood, PRIYANKA

## 2022-01-14 ENCOUNTER — TELEPHONE (OUTPATIENT)
Dept: FAMILY MEDICINE | Facility: CLINIC | Age: 77
End: 2022-01-14
Payer: MEDICARE

## 2022-01-14 DIAGNOSIS — R97.20 ELEVATED PSA: Primary | ICD-10-CM

## 2022-01-14 NOTE — TELEPHONE ENCOUNTER
----- Message from Anne Sanchez sent at 1/14/2022  8:21 AM CST -----  Type: Needs Medical Advice  Who Called:  Patient  Best Call Back Number:   Additional Information:Patient's psa is elevated, calling to find out what Dr Lemos recommends he should do.

## 2022-01-14 NOTE — TELEPHONE ENCOUNTER
----- Message from Chalo Green, Patient Care Assistant sent at 1/14/2022  4:15 PM CST -----  Contact: Pt  Type: Needs Medical Advice    Who Called: Pt  Best Call Back Number: [phone]/ Dr Sarabia Office [phone]    Inquiry/Question: Pt is calling to see if he can have a referral sent to Dr Quentin Sarabia in [place]. Pt [facility]. Please call back and advise. Thank you~

## 2022-01-14 NOTE — TELEPHONE ENCOUNTER
Informed patient of results and referral - patient stated he will call back to schedule the appointment. - verbalized understanding

## 2022-02-04 ENCOUNTER — TELEPHONE (OUTPATIENT)
Dept: FAMILY MEDICINE | Facility: CLINIC | Age: 77
End: 2022-02-04
Payer: MEDICARE

## 2022-02-04 DIAGNOSIS — R97.20 ELEVATED PSA: Primary | ICD-10-CM

## 2022-02-04 NOTE — TELEPHONE ENCOUNTER
----- Message from Dougie Sultana sent at 2/4/2022 11:23 AM CST -----  Contact: Pt  Type:  Patient Requesting Referral    Who Called: Pt  Does the patient already have the specialty appointment scheduled?: Yes  If yes, what is the date of that appointment?: 2/8  Referral to What Specialty: Urology  Reason for Referral: Elevated PSA  Does the patient want the referral with a specific physician?: Regan Gao  Is the specialist an [facility] or [facility]   Patient Requesting a Call Back?No  Best Call Back Number: [phone]    Additional Information: N/A

## 2022-02-04 NOTE — TELEPHONE ENCOUNTER
PCP IS OUT patient has an appointment on 2/8/22 with Dr Gao. He is needing a referral for urology. Order pending. Please sign for Dr Lemos

## 2022-02-10 ENCOUNTER — TELEPHONE (OUTPATIENT)
Dept: UROLOGY | Facility: CLINIC | Age: 77
End: 2022-02-10
Payer: MEDICARE

## 2022-02-10 ENCOUNTER — PATIENT OUTREACH (OUTPATIENT)
Dept: ADMINISTRATIVE | Facility: OTHER | Age: 77
End: 2022-02-10
Payer: MEDICARE

## 2022-02-10 NOTE — PROGRESS NOTES
Health Maintenance Due   Topic Date Due    Shingles Vaccine (2 of 3) 11/26/2015     Updates were requested from care everywhere.  Chart was reviewed for overdue Proactive [facility] Encounters ([facility]) topics (CRS, Breast Cancer Screening, Eye exam)  Health Maintenance has been updated.  LINKS immunization registry triggered.  Immunizations were reconciled.

## 2022-02-10 NOTE — TELEPHONE ENCOUNTER
----- Message from Lorena Vo sent at 2/10/2022 11:32 AM CST -----  Contact: pt  Type: Needs Medical Advice  Who Called:  pt   Best Call Back Number: [phone]    Additional Information: please call regarding MRI instructions, how to prepare to test

## 2022-02-11 ENCOUNTER — TELEPHONE (OUTPATIENT)
Dept: OTOLARYNGOLOGY | Facility: CLINIC | Age: 77
End: 2022-02-11

## 2022-02-11 ENCOUNTER — TELEPHONE (OUTPATIENT)
Dept: OTOLARYNGOLOGY | Facility: CLINIC | Age: 77
End: 2022-02-11
Payer: MEDICARE

## 2022-02-11 ENCOUNTER — OFFICE VISIT (OUTPATIENT)
Dept: OTOLARYNGOLOGY | Facility: CLINIC | Age: 77
End: 2022-02-11
Payer: MEDICARE

## 2022-02-11 DIAGNOSIS — R13.19 ESOPHAGEAL DYSPHAGIA: Primary | ICD-10-CM

## 2022-02-11 PROCEDURE — 99203 PR OFFICE/OUTPT VISIT, NEW, LEVL III, 30-44 MIN: ICD-10-PCS | Mod: HCNC,95,, | Performed by: STUDENT IN AN ORGANIZED HEALTH CARE EDUCATION/TRAINING PROGRAM

## 2022-02-11 PROCEDURE — 99203 OFFICE O/P NEW LOW 30 MIN: CPT | Mod: HCNC,95,, | Performed by: STUDENT IN AN ORGANIZED HEALTH CARE EDUCATION/TRAINING PROGRAM

## 2022-02-11 NOTE — TELEPHONE ENCOUNTER
----- Message from Delmy Ji LPN sent at 2/11/2022 10:34 AM CST -----  Good Morning,    Dr Olmos had a virtual visit with Mr. Anderson today but he needs to be seen in person for a possible scope exam and he lives in [place] and Dr Olmos would like him to be scheduled with Dr. Jett.    Thanks,  Delmy

## 2022-02-11 NOTE — PROGRESS NOTES
Chief complaint:  No chief complaint on file.          Referring Provider:  No referring provider defined for this encounter.      History of present illness:     Mr. Anderson is a 76 y.o. with IBD, PVD, COPD presenting for evaluation of odynophagia.     Described as discomfort swallowing for last one week. Only when swallowing. Some discomfort in throat, but mostly upper chest, right side. Episodes resolve after eating. Worse with solids.     Additional symptoms: Voice change - deeper, raspy, stable for last 3-4 months and not improving. Denies stridor or SOB.    He denies coughing/choking with swallow, delayed regurgitation or vomiting, recurrent PNA, otalgia, neck mass, hemoptysis, unintentional weight loss, history of radiation to head and neck, or neurologic disorders.    Tobacco exposure - lifelong smoker, still smoking       History      Past Medical History:   Past Medical History:   Diagnosis Date    Cataract     OU    Claudication of lower extremity     Colon polyp     COPD (chronic obstructive pulmonary disease)     Hx of mumps     Hyperlipidemia     Kidney stone     Left carotid stenosis     procedure done to fix    Lumbar disc disorder     PVD (peripheral vascular disease)     Sleep apnea     uses cpap    Tobacco dependence          Past Surgical History:  Past Surgical History:   Procedure Laterality Date    CAROTID ENDARTERECTOMY Left 2015    after 6/2015    CAROTID ENDARTERECTOMY Left 06/2016    CATARACT EXTRACTION Bilateral     COLONOSCOPY  03/05/2013    Dr. Morton, repeat in 5 years for surveillance    COLONOSCOPY  2007    in legacy    COLONOSCOPY N/A 5/15/2018    Procedure: COLONOSCOPY;  Surgeon: Quentin Morton MD;  Location: [facility];  Service: Endoscopy;  Laterality: N/A; Erythematous, granular and inflamed mucosa in the entire colon; biopsy: colon- Chronic active colitis with cryptitis and crypt abscesses. rectum- Chronic active colitis with cryptitis and crypt  abscess    COLONOSCOPY N/A 2/19/2021    Procedure: COLONOSCOPY;  Surgeon: Rishi Nino MD;  [facility] ENDO;  Service: Endoscopy;  Laterality: N/A;    COMPUTED TOMOGRAPHY ANGIOGRAM Left 05/26/2016    Leg    EPIDURAL BLOCK INJECTION      EYE SURGERY      FEMORAL ARTERY STENT  2010    FEMORAL ARTERY STENT Left 05/26/2016    LAMINECTOMY  2012    left sup fem and prox popliteal stents  9/30/15    x 2    TONSILLECTOMY      UVULECTOMY           Medications: Medication list reviewed. He  has a current medication list which includes the following prescription(s): clopidogrel, ezetimibe, breo ellipta, mesalamine, multivit with minerals/lutein, nitroglycerin, rosuvastatin, sertraline, spiriva with handihaler, and trazodone.     Allergies:   Review of patient's allergies indicates:   Allergen Reactions    No known drug allergies          Family history: family history includes Alzheimer's disease in his mother; Cancer in his father; Cataracts in his mother and sister.         Social History          Alcohol use:  reports current alcohol use.            Tobacco:  reports that he has been smoking cigarettes. He has a 55.00 pack-year smoking history. He has never used smokeless tobacco.         Physical Examination      Vitals: There were no vitals taken for this visit.      General: Well developed, well nourished, well hydrated.     Voice: mild dysphonia, no stridor, no dysarthria      Head/Face: Normocephalic, atraumatic.Facial musculature equal.    Ears:     · Right ear: No gross deformity.      · Left ear: No gross deformity.     Nose: No gross deformity or lesions. No purulent discharge. No significant NSD.      Mouth/Oropharynx: Lips without any lesions.     Skin: No scars or lesions on face or neck.      Neurologic: Moving upper extremities without gross abnormality       Assessment/Plan:    Esophageal dysphagia   Esophagram and likely GI referral pending results     Dysphonia   prolonged course,  in setting of smoking history, recommend laryngoscopy. Discussed need to rule out carcinoma. Discussed functional causes of age-related voice change as well    Will plan to see Dr. Jett in person for laryngoscopy in the near future.          Josh Olmos MD  [facility]   [facility]  [id number] [place] [address].  CARMELA Bashir [id number]  P: [phone]  F: [phone]

## 2022-02-11 NOTE — TELEPHONE ENCOUNTER
Called radiology scheduling in [place] multiple times, No answer, left voicemail to please call pt to schedule swallow study.  Called pt and left voicemail regarding this.

## 2022-02-21 ENCOUNTER — TELEPHONE (OUTPATIENT)
Dept: GASTROENTEROLOGY | Facility: CLINIC | Age: 77
End: 2022-02-21
Payer: MEDICARE

## 2022-02-21 RX ORDER — HYDROCORTISONE 25 MG/G
CREAM TOPICAL 2 TIMES DAILY
Qty: 28 G | Refills: 3 | Status: SHIPPED | OUTPATIENT
Start: 2022-02-21 | End: 2022-02-28

## 2022-02-21 RX ORDER — HYDROCORTISONE ACETATE 25 MG/1
25 SUPPOSITORY RECTAL 2 TIMES DAILY
Qty: 28 SUPPOSITORY | Refills: 1 | Status: SHIPPED | OUTPATIENT
Start: 2022-02-21 | End: 2022-03-07

## 2022-02-21 NOTE — TELEPHONE ENCOUNTER
----- Message from Dalton Roberts sent at 2/21/2022  9:10 AM CST -----  Type:  Same Day Appointment Request    Caller is requesting a same day appointment.  Caller declined first available appointment listed below.      Name of Caller:  Pt  When is the first available appointment?  3/8  Symptoms:  Hemroids/ constipation  Best Call Back Number:  [phone]  Additional Information:   Pt sts hes been on Hydrocortizone and Prep H for a week and a stool softner and metamucil daily and is in a ton of pain.  Has to strain in the bathroomPlease advise -- Thank you

## 2022-02-21 NOTE — TELEPHONE ENCOUNTER
Patient is having rectal pain due to hemorrhoid despite using hydrocortizone and preparation h for a week.     He says that he is straining to have a BM, and that his BMs are small but soft. He feels like he is not completely emptying with his BMs, but also does not think he is constipated.     He is taking a stool softner and metamucil daily, and it does not seem to be helping with his symptoms.

## 2022-02-23 NOTE — TELEPHONE ENCOUNTER
Rishi Nino MD  You 2 days ago         Schedule appointment with Bhumika. He can start Miralax 17 g PO daily. I will send in suppositories which may help.    Message text

## 2022-03-25 ENCOUNTER — PATIENT OUTREACH (OUTPATIENT)
Dept: ADMINISTRATIVE | Facility: OTHER | Age: 77
End: 2022-03-25
Payer: MEDICARE

## 2022-03-26 NOTE — PROGRESS NOTES
Health Maintenance Due   Topic Date Due    Shingles Vaccine (2 of 3) 11/26/2015    COVID-19 Vaccine (4 - Booster for Pfizer series) 03/05/2022    LDCT Lung Screen  03/24/2022     Updates were requested from care everywhere.  Chart was reviewed for overdue [facility]active [facility] Encounters ([facility]) topics (CRS, Breast Cancer Screening, Eye exam)  Health Maintenance has been updated.  LINKS immunization registry triggered.  Immunizations were reconciled.

## 2022-03-28 ENCOUNTER — OFFICE VISIT (OUTPATIENT)
Dept: OTOLARYNGOLOGY | Facility: CLINIC | Age: 77
End: 2022-03-28
Payer: MEDICARE

## 2022-03-28 VITALS — WEIGHT: 154.13 LBS | BODY MASS INDEX: 24.77 KG/M2 | HEIGHT: 66 IN

## 2022-03-28 DIAGNOSIS — J38.01 PARALYSIS OF LEFT VOCAL CORD: ICD-10-CM

## 2022-03-28 DIAGNOSIS — H60.63 CHRONIC NON-INFECTIVE OTITIS EXTERNA OF BOTH EARS, UNSPECIFIED TYPE: ICD-10-CM

## 2022-03-28 DIAGNOSIS — R91.8 MULTIPLE PULMONARY NODULES: ICD-10-CM

## 2022-03-28 DIAGNOSIS — R13.19 ESOPHAGEAL DYSPHAGIA: ICD-10-CM

## 2022-03-28 DIAGNOSIS — R49.0 DYSPHONIA: Primary | ICD-10-CM

## 2022-03-28 DIAGNOSIS — R49.0 MUSCLE TENSION DYSPHONIA: ICD-10-CM

## 2022-03-28 PROCEDURE — 1101F PT FALLS ASSESS-DOCD LE1/YR: CPT | Mod: CPTII,S$GLB,, | Performed by: OTOLARYNGOLOGY

## 2022-03-28 PROCEDURE — 99214 OFFICE O/P EST MOD 30 MIN: CPT | Mod: 25,S$GLB,, | Performed by: OTOLARYNGOLOGY

## 2022-03-28 PROCEDURE — 31575 PR LARYNGOSCOPY, FLEXIBLE; DIAGNOSTIC: ICD-10-PCS | Mod: S$GLB,,, | Performed by: OTOLARYNGOLOGY

## 2022-03-28 PROCEDURE — 1160F PR REVIEW ALL MEDS BY PRESCRIBER/CLIN PHARMACIST DOCUMENTED: ICD-10-PCS | Mod: CPTII,S$GLB,, | Performed by: OTOLARYNGOLOGY

## 2022-03-28 PROCEDURE — 31575 DIAGNOSTIC LARYNGOSCOPY: CPT | Mod: S$GLB,,, | Performed by: OTOLARYNGOLOGY

## 2022-03-28 PROCEDURE — 1160F RVW MEDS BY RX/DR IN RCRD: CPT | Mod: CPTII,S$GLB,, | Performed by: OTOLARYNGOLOGY

## 2022-03-28 PROCEDURE — 3288F FALL RISK ASSESSMENT DOCD: CPT | Mod: CPTII,S$GLB,, | Performed by: OTOLARYNGOLOGY

## 2022-03-28 PROCEDURE — 3288F PR FALLS RISK ASSESSMENT DOCUMENTED: ICD-10-PCS | Mod: CPTII,S$GLB,, | Performed by: OTOLARYNGOLOGY

## 2022-03-28 PROCEDURE — 1159F MED LIST DOCD IN RCRD: CPT | Mod: CPTII,S$GLB,, | Performed by: OTOLARYNGOLOGY

## 2022-03-28 PROCEDURE — 99214 PR OFFICE/OUTPT VISIT, EST, LEVL IV, 30-39 MIN: ICD-10-PCS | Mod: 25,S$GLB,, | Performed by: OTOLARYNGOLOGY

## 2022-03-28 PROCEDURE — 1126F PR PAIN SEVERITY QUANTIFIED, NO PAIN PRESENT: ICD-10-PCS | Mod: CPTII,S$GLB,, | Performed by: OTOLARYNGOLOGY

## 2022-03-28 PROCEDURE — 99999 PR PBB SHADOW E&M-EST. PATIENT-LVL IV: ICD-10-PCS | Mod: PBBFAC,,, | Performed by: OTOLARYNGOLOGY

## 2022-03-28 PROCEDURE — 1101F PR PT FALLS ASSESS DOC 0-1 FALLS W/OUT INJ PAST YR: ICD-10-PCS | Mod: CPTII,S$GLB,, | Performed by: OTOLARYNGOLOGY

## 2022-03-28 PROCEDURE — 1159F PR MEDICATION LIST DOCUMENTED IN MEDICAL RECORD: ICD-10-PCS | Mod: CPTII,S$GLB,, | Performed by: OTOLARYNGOLOGY

## 2022-03-28 PROCEDURE — 1126F AMNT PAIN NOTED NONE PRSNT: CPT | Mod: CPTII,S$GLB,, | Performed by: OTOLARYNGOLOGY

## 2022-03-28 PROCEDURE — 99999 PR PBB SHADOW E&M-EST. PATIENT-LVL IV: CPT | Mod: PBBFAC,,, | Performed by: OTOLARYNGOLOGY

## 2022-03-28 NOTE — PATIENT INSTRUCTIONS
Use a steroid cream in the ear canal (thin layer) twice daily for 7-10 days during flair and as needed.  Examples are Triamcinolone OR betamethasone cream

## 2022-03-28 NOTE — PROGRESS NOTES
Subjective:       Patient ID: Castillo Anderson Jr. is a 76 y.o. male.    Chief Complaint: Hoarse and sores in ears      Castillo is here for follow-up of dysphonia.   Saw Dr. Olmos in virtual visit for dysphagia and dysphonia.   Dysphonia is deep in quality, 3-4 months. No pain or bleeding.   Dysphagia has improved somewhat. He deferred esophagram. Denies aspiration.   He also c/o sores in his ears, conchal bowl.   Has yearly CT Chest with Dr. Zheng. Last 12 mos ago.    HPI 2/11/2022  Described as discomfort swallowing for last one week. Only when swallowing. Some discomfort in throat, but mostly upper chest, right side. Episodes resolve after eating. Worse with solids.      Additional symptoms: Voice change - deeper, raspy, stable for last 3-4 months and not improving. Denies stridor or SOB.     He denies coughing/choking with swallow, delayed regurgitation or vomiting, recurrent PNA, otalgia, neck mass, hemoptysis, unintentional weight loss, history of radiation to head and neck, or neurologic disorders.     Tobacco exposure - lifelong smoker, still smoking     Patient validated questionnaires (if applicable):      %       No flowsheet data found.  EAT-10 Total Score  2/10/2022   EAT 10 Total Score 1     No flowsheet data found.         Review of Systems   Constitutional: Negative for activity change and appetite change.   Respiratory: Negative for difficulty breathing and wheezing   Cardiovascular: Negative for chest pain.      Objective:        Constitutional:   He appears well-developed and well-nourished.     Head:  Normocephalic and atraumatic.     Mouth/Throat  Lips, teeth, and gums normal.   Hyperactive gag reflux. Unable to visualize cords on mirror laryngoscopy.        Pulmonary/Chest:   Effort normal.     Psychiatric:   He has a normal mood and affect.         Tests / Results:  Pre-procedure diagnosis: The primary encounter diagnosis was Dysphonia. Diagnoses of Esophageal dysphagia, Chronic  non-infective otitis externa of both ears, unspecified type, Paralysis of left vocal cord, and Muscle tension dysphonia were also pertinent to this visit.     Post-procedure diagnosis: same    Procedure: Flexible fiberoptic laryngoscopy    Surgeon: Roldan Jett MD    Anesthesia: 2% Lidocaine with Phenylephrine topical    Risks, benefits, and alternatives of the procedure were discussed with the patient, and the patient consented to the fiberoptic examination.  We applied a topical nasal decongestant and analgesic.  After adequate anesthesia was obtained, the flexible fiberoptic scope was passed through the nasal cavity. The entire pharynx (nasopharynx to hypopharynx) and the larynx were visualized. At the end of the examination, the scope was removed. The patient tolerated the procedure well with no complications.     Findings:  -     Laryngeal mucosa is normal  -     Post-cricoid region: normal  -     Lingual tonsils have no hypertrophy  -     Adenoids have no  hypertrophy  -     Right vocal fold: normal mobility     mass/lesion: none  -     Left vocal fold: complete immobility, median position     mass/lesion: none  -     Other findings: supraglottic R hyperfunction      CT Chest 3/2021:  Narrative & Impression  EXAMINATION:  CT CHEST WITHOUT CONTRAST     CLINICAL HISTORY:  lung nodules, multiple; Other nonspecific abnormal finding of lung field     TECHNIQUE:  Low-dose non-contrast axial images were acquired through the chest.  Subsequently, coronal and sagittal reconstructed images were generated from the source data.  Axial MIPs were generated to improve nodule detection.     COMPARISON:  CT of the chest without contrast-11/05/2019 and CT of the abdomen with contrast-11/24/2020     FINDINGS:  Soft tissue structures at the base of the neck are unremarkable.  There is atherosclerotic calcification present within the thoracic aortic arch and subclavian arteries.  There there is prominent atherosclerotic  calcification present within the coronary arteries.  There are aortic annulus calcifications.  There are aortic valve calcifications.  No pericardial fluid.  There is concentric mural thickening present within the mid and distal esophagus.  Esophagitis cannot be excluded.     There is a 9 x 16 mm right paratracheal lymph node present on series 2, image 44.  There are more numerous than normal but not pathologically enlarged axillary lymph nodes.  The trachea and mainstem bronchi are unremarkable.     There are imaging findings which suggest chronic obstructive airways disease including multiple subpleural blebs in the lung apices, left greater than right.  There are multiple unchanged scattered solid pulmonary nodules in the chest which are similar in size and number as compared to the previous exam.  The largest solid nodule in the right chest measures 7.5 mm in the right upper lobe on series 4, image 190, unchanged.  There is also an unchanged 5 mm right upper lobe subpleural pulmonary nodule on series 4, image 235.  There is a unchanged 4 mm solid nodule in the superior segment of the right lower lobe on series 4, image 217.  There is pleural based linear fibrosis in the left upper lobe on series 4, image 88, unchanged.  There is a 3 mm solid nodule posteromedially in the left lower lobe on series 4, image 161.  There is a vague a ground-glass opacity measuring 7 mm on series 4, image 155, unchanged.  No new pulmonary nodules/masses which require additional follow-up.  No new zone of airspace consolidation, pleural fluid, or pneumothorax.  No bronchiectasis.     There is a possible calcified gallstone observed in the gallbladder lumen on series 2, image 107.  Consider additional evaluation with abdominal ultrasound.  There is a large volume of stool in the colon.  Please correlate for symptoms of constipation.  There is a 23 x 31 mm lipid rich right adrenal adenoma, unchanged.  There is a 16 mm lipid rich left  adrenal adenoma, unchanged.  As best appreciated on the lower most slice image (series 2, image 107, there is a 29 mm hypodensity which measures water attenuation along the anterolateral margin of the right kidney, statistically most likely a cyst.     There is multilevel degenerative change of the thoracic spine.     Impression:     1. No significant interval change when compared to the prior study.  Multiple scattered solid nodules throughout the chest, the largest of which measures 7.5 mm in the right upper lobe, unchanged in size and number when compared to the previous study.  No new pulmonary nodules.  Return to annual CT lung screening is recommended.  2. Possible cholelithiasis.  3. Prominent coronary artery calcifications.  4. Bilateral lipid rich adrenal adenomas, unchanged.  5. Possible constipation.  Please correlate clinically.  6. Probable cyst in the upper pole of the right kidney, unchanged when compared to the prior CT of the abdomen and pelvis performed 11/24/2020.  7. Emphysematous lung architecture.  Assessment:       1. Dysphonia    2. Esophageal dysphagia    3. Chronic non-infective otitis externa of both ears, unspecified type    4. Paralysis of left vocal cord    5. Muscle tension dysphonia          Plan:       Discussed L vocal cord paralysis.  Recommend CT Chest as done yearly (due) and also CT Neck with contrast  Discussed general treatment. He defers intervention for vocal cord paralysis at this time.      Esophagram prev ordered

## 2022-04-06 ENCOUNTER — HOSPITAL ENCOUNTER (OUTPATIENT)
Dept: RADIOLOGY | Facility: HOSPITAL | Age: 77
Discharge: HOME | End: 2022-04-06
Attending: OTOLARYNGOLOGY
Payer: MEDICARE

## 2022-04-06 ENCOUNTER — HOSPITAL ENCOUNTER (OUTPATIENT)
Dept: RADIOLOGY | Facility: HOSPITAL | Age: 77
Discharge: HOME | End: 2022-04-06
Attending: INTERNAL MEDICINE
Payer: MEDICARE

## 2022-04-06 DIAGNOSIS — J38.01 PARALYSIS OF LEFT VOCAL CORD: ICD-10-CM

## 2022-04-06 DIAGNOSIS — R91.8 MULTIPLE PULMONARY NODULES: ICD-10-CM

## 2022-04-06 DIAGNOSIS — R49.0 MUSCLE TENSION DYSPHONIA: ICD-10-CM

## 2022-04-06 DIAGNOSIS — R49.0 DYSPHONIA: ICD-10-CM

## 2022-04-06 PROCEDURE — 71250 CT THORAX DX C-: CPT | Mod: TC,PO

## 2022-04-06 PROCEDURE — 70491 CT SOFT TISSUE NECK WITH CONTRAST: ICD-10-PCS | Mod: 26,,, | Performed by: RADIOLOGY

## 2022-04-06 PROCEDURE — 25500020 PHARM REV CODE 255: Mod: PO | Performed by: OTOLARYNGOLOGY

## 2022-04-06 PROCEDURE — 71250 CT THORAX DX C-: CPT | Mod: 26,,, | Performed by: RADIOLOGY

## 2022-04-06 PROCEDURE — 70491 CT SOFT TISSUE NECK W/DYE: CPT | Mod: 26,,, | Performed by: RADIOLOGY

## 2022-04-06 PROCEDURE — 71250 CT CHEST WITHOUT CONTRAST: ICD-10-PCS | Mod: 26,,, | Performed by: RADIOLOGY

## 2022-04-06 PROCEDURE — 70491 CT SOFT TISSUE NECK W/DYE: CPT | Mod: TC,PO

## 2022-04-06 RX ADMIN — IOHEXOL 75 ML: 350 INJECTION, SOLUTION INTRAVENOUS at 03:04

## 2022-04-20 ENCOUNTER — TELEPHONE (OUTPATIENT)
Dept: FAMILY MEDICINE | Facility: CLINIC | Age: 77
End: 2022-04-20
Payer: MEDICARE

## 2022-04-20 NOTE — TELEPHONE ENCOUNTER
----- Message from Lillie West sent at 4/20/2022 12:11 PM CDT -----  Type:  Patient Returning Call         Who Called:  TAMIKO COPPOLA JR.         Who Left Message for Patient: Azra Frank MA         Does the patient know what this is regarding?: yes         Would the patient rather a call back or a response via [facility]?  Call back         Best Call Back Number:[phone]         Additional Information:

## 2022-04-20 NOTE — TELEPHONE ENCOUNTER
----- Message from Ladan Mcmillan sent at 4/20/2022 10:04 AM CDT -----  Regarding: [facility]  Name of Who is Calling: connie ([facility] [place])      What is the request in detail: requesting copy of clinical notes  of 10-01-21  of patient visit. Please advise       Can the clinic reply by [facility]: NO      What Number to Call Back if not in [facility]: [phone] fax [phone]

## 2022-04-27 ENCOUNTER — OFFICE VISIT (OUTPATIENT)
Dept: FAMILY MEDICINE | Facility: CLINIC | Age: 77
End: 2022-04-27
Payer: MEDICARE

## 2022-04-27 DIAGNOSIS — R49.0 HOARSENESS: ICD-10-CM

## 2022-04-27 DIAGNOSIS — G47.33 OSA ON CPAP: Primary | ICD-10-CM

## 2022-04-27 DIAGNOSIS — J38.00 VOCAL CORD PARALYSIS: ICD-10-CM

## 2022-04-27 DIAGNOSIS — J44.9 CHRONIC OBSTRUCTIVE PULMONARY DISEASE, UNSPECIFIED COPD TYPE: ICD-10-CM

## 2022-04-27 PROCEDURE — 1159F PR MEDICATION LIST DOCUMENTED IN MEDICAL RECORD: ICD-10-PCS | Mod: CPTII,95,, | Performed by: FAMILY MEDICINE

## 2022-04-27 PROCEDURE — 1160F RVW MEDS BY RX/DR IN RCRD: CPT | Mod: CPTII,95,, | Performed by: FAMILY MEDICINE

## 2022-04-27 PROCEDURE — 1160F PR REVIEW ALL MEDS BY PRESCRIBER/CLIN PHARMACIST DOCUMENTED: ICD-10-PCS | Mod: CPTII,95,, | Performed by: FAMILY MEDICINE

## 2022-04-27 PROCEDURE — 99499 RISK ADDL DX/OHS AUDIT: ICD-10-PCS | Mod: 95,,, | Performed by: FAMILY MEDICINE

## 2022-04-27 PROCEDURE — 99499 UNLISTED E&M SERVICE: CPT | Mod: 95,,, | Performed by: FAMILY MEDICINE

## 2022-04-27 PROCEDURE — 99214 PR OFFICE/OUTPT VISIT, EST, LEVL IV, 30-39 MIN: ICD-10-PCS | Mod: 95,,, | Performed by: FAMILY MEDICINE

## 2022-04-27 PROCEDURE — 99214 OFFICE O/P EST MOD 30 MIN: CPT | Mod: 95,,, | Performed by: FAMILY MEDICINE

## 2022-04-27 PROCEDURE — 1159F MED LIST DOCD IN RCRD: CPT | Mod: CPTII,95,, | Performed by: FAMILY MEDICINE

## 2022-04-27 NOTE — PROGRESS NOTES
Subjective:       Patient ID: Castillo Anderson Jr. is a 76 y.o. male.    Chief Complaint: No chief complaint on file.    HPI     The patient location is: [place]  The chief complaint leading to consultation is: rodney    Visit type: audiovisual    Face to Face time with patient:   30 minutes of total time spent on the encounter, which includes face to face time and non-face to face time preparing to see the patient (eg, review of tests), Obtaining and/or reviewing separately obtained history, Documenting clinical information in the electronic or other health record, Independently interpreting results (not separately reported) and communicating results to the patient/family/caregiver, or Care coordination (not separately reported).         Each patient to whom he or she provides medical services by telemedicine is:  (1) informed of the relationship between the physician and patient and the respective role of any other health care provider with respect to management of the patient; and (2) notified that he or she may decline to receive medical services by telemedicine and may withdraw from such care at any time.    Notes:     Copd stable.     rodney-on cpap. Is due for a new cpap machine with accessories.      Has left vocal cord paralysis which is cause of his hoarseness.       Review of Systems    Objective:      Physical Exam    Assessment:       1. RODNEY on CPAP    2. Chronic obstructive pulmonary disease, unspecified COPD type    3. Hoarseness    4. Vocal cord paralysis        Plan:       RODNEY on CPAP    Chronic obstructive pulmonary disease, unspecified COPD type    Hoarseness    Vocal cord paralysis                Plan:  Recommended against sleep apnea implant  Patient will reach out to [employer] [place] [facility] for cpap settings. Then, [employer] will fax an order for me to sign.   Cont current meds    Medication List with Changes/Refills   Current Medications    CLOPIDOGREL (PLAVIX) 75 MG TABLET    TAKE 1 TABLET EVERY DAY     EZETIMIBE (ZETIA) 10 MG TABLET    Take 1 tablet (10 mg total) by mouth once daily.    FLUTICASONE FUROATE-VILANTEROL (BREO ELLIPTA) 100-25 MCG/DOSE DISKUS INHALER    Inhale 1 puff into the lungs once daily. Controller    MESALAMINE (CANASA) 1000 MG SUPP    Place 1 suppository (1,000 mg total) rectally nightly.    MULTIVIT WITH MINERALS/LUTEIN (MULTIVITAMIN 50 PLUS ORAL)    multivitamin    NITROGLYCERIN (NITROSTAT) 0.4 MG SL TABLET    nitroglycerin 0.4 mg sublingual tablet    ROSUVASTATIN (CRESTOR) 20 MG TABLET    Take 1 tablet (20 mg total) by mouth once daily.    SERTRALINE (ZOLOFT) 50 MG TABLET        TIOTROPIUM (SPIRIVA WITH HANDIHALER) 18 MCG INHALATION CAPSULE    Inhale 1 capsule (18 mcg total) into the lungs once daily. Controller    TRAZODONE (DESYREL) 50 MG TABLET    Take 50 mg by mouth every evening. Every day

## 2022-04-29 ENCOUNTER — TELEPHONE (OUTPATIENT)
Dept: FAMILY MEDICINE | Facility: CLINIC | Age: 77
End: 2022-04-29
Payer: MEDICARE

## 2022-04-29 DIAGNOSIS — G47.33 OSA ON CPAP: Primary | ICD-10-CM

## 2022-05-09 ENCOUNTER — PATIENT MESSAGE (OUTPATIENT)
Dept: SMOKING CESSATION | Facility: CLINIC | Age: 77
End: 2022-05-09
Payer: MEDICARE

## 2022-05-25 ENCOUNTER — TELEPHONE (OUTPATIENT)
Dept: FAMILY MEDICINE | Facility: CLINIC | Age: 77
End: 2022-05-25

## 2022-11-18 ENCOUNTER — CLINICAL SUPPORT (OUTPATIENT)
Dept: AUDIOLOGY | Facility: CLINIC | Age: 77
End: 2022-11-18
Payer: MEDICARE

## 2022-11-18 DIAGNOSIS — H90.3 SENSORINEURAL HEARING LOSS (SNHL) OF BOTH EARS: Primary | ICD-10-CM

## 2022-11-18 PROCEDURE — 92557 COMPREHENSIVE HEARING TEST: CPT | Mod: S$GLB,,, | Performed by: AUDIOLOGIST

## 2022-11-18 PROCEDURE — 92567 TYMPANOMETRY: CPT | Mod: S$GLB,,, | Performed by: AUDIOLOGIST

## 2022-11-18 PROCEDURE — 92557 PR COMPREHENSIVE HEARING TEST: ICD-10-PCS | Mod: S$GLB,,, | Performed by: AUDIOLOGIST

## 2022-11-18 PROCEDURE — 92567 PR TYMPA2METRY: ICD-10-PCS | Mod: S$GLB,,, | Performed by: AUDIOLOGIST

## 2022-12-09 ENCOUNTER — TELEPHONE (OUTPATIENT)
Dept: FAMILY MEDICINE | Facility: CLINIC | Age: 77
End: 2022-12-09
Payer: MEDICARE

## 2023-02-09 DIAGNOSIS — Z00.00 ENCOUNTER FOR MEDICARE ANNUAL WELLNESS EXAM: ICD-10-CM

## 2023-05-02 ENCOUNTER — OFFICE VISIT (OUTPATIENT)
Dept: FAMILY MEDICINE | Facility: CLINIC | Age: 78
End: 2023-05-02
Payer: MEDICARE

## 2023-05-02 VITALS
WEIGHT: 151.69 LBS | HEART RATE: 66 BPM | HEIGHT: 66 IN | SYSTOLIC BLOOD PRESSURE: 136 MMHG | BODY MASS INDEX: 24.38 KG/M2 | DIASTOLIC BLOOD PRESSURE: 80 MMHG | OXYGEN SATURATION: 99 %

## 2023-05-02 DIAGNOSIS — Z87.891 PERSONAL HISTORY OF NICOTINE DEPENDENCE: ICD-10-CM

## 2023-05-02 DIAGNOSIS — E78.2 MIXED HYPERLIPIDEMIA: ICD-10-CM

## 2023-05-02 DIAGNOSIS — J44.9 CHRONIC OBSTRUCTIVE PULMONARY DISEASE, UNSPECIFIED COPD TYPE: ICD-10-CM

## 2023-05-02 DIAGNOSIS — I73.9 PVD (PERIPHERAL VASCULAR DISEASE): ICD-10-CM

## 2023-05-02 DIAGNOSIS — I65.29 ARTERIOSCLEROSIS OF CAROTID ARTERY, UNSPECIFIED LATERALITY: ICD-10-CM

## 2023-05-02 DIAGNOSIS — I70.0 ATHEROSCLEROSIS OF AORTA: ICD-10-CM

## 2023-05-02 DIAGNOSIS — I70.212 ATHEROSCLEROSIS OF NATIVE ARTERY OF LEFT LOWER EXTREMITY WITH INTERMITTENT CLAUDICATION: ICD-10-CM

## 2023-05-02 DIAGNOSIS — D69.2 SENILE PURPURA: ICD-10-CM

## 2023-05-02 DIAGNOSIS — R07.9 CHEST PAIN, UNSPECIFIED TYPE: ICD-10-CM

## 2023-05-02 DIAGNOSIS — Z00.00 ENCOUNTER FOR MEDICARE ANNUAL WELLNESS EXAM: ICD-10-CM

## 2023-05-02 DIAGNOSIS — K51.019 ULCERATIVE COLITIS, UNIVERSAL, UNSPECIFIED COMPLICATION: ICD-10-CM

## 2023-05-02 DIAGNOSIS — F33.1 MODERATE EPISODE OF RECURRENT MAJOR DEPRESSIVE DISORDER: ICD-10-CM

## 2023-05-02 DIAGNOSIS — Z00.00 ENCOUNTER FOR PREVENTIVE HEALTH EXAMINATION: Primary | ICD-10-CM

## 2023-05-02 PROCEDURE — 3079F DIAST BP 80-89 MM HG: CPT | Mod: HCNC,CPTII,S$GLB, | Performed by: NURSE PRACTITIONER

## 2023-05-02 PROCEDURE — 93005 EKG 12-LEAD: ICD-10-PCS | Mod: HCNC,S$GLB,, | Performed by: NURSE PRACTITIONER

## 2023-05-02 PROCEDURE — 3288F FALL RISK ASSESSMENT DOCD: CPT | Mod: HCNC,CPTII,S$GLB, | Performed by: NURSE PRACTITIONER

## 2023-05-02 PROCEDURE — 99999 PR PBB SHADOW E&M-EST. PATIENT-LVL V: ICD-10-PCS | Mod: PBBFAC,HCNC,, | Performed by: NURSE PRACTITIONER

## 2023-05-02 PROCEDURE — 1101F PT FALLS ASSESS-DOCD LE1/YR: CPT | Mod: HCNC,CPTII,S$GLB, | Performed by: NURSE PRACTITIONER

## 2023-05-02 PROCEDURE — G0439 PPPS, SUBSEQ VISIT: HCPCS | Mod: HCNC,S$GLB,, | Performed by: NURSE PRACTITIONER

## 2023-05-02 PROCEDURE — 1126F PR PAIN SEVERITY QUANTIFIED, NO PAIN PRESENT: ICD-10-PCS | Mod: HCNC,CPTII,S$GLB, | Performed by: NURSE PRACTITIONER

## 2023-05-02 PROCEDURE — 1126F AMNT PAIN NOTED NONE PRSNT: CPT | Mod: HCNC,CPTII,S$GLB, | Performed by: NURSE PRACTITIONER

## 2023-05-02 PROCEDURE — 1159F MED LIST DOCD IN RCRD: CPT | Mod: HCNC,CPTII,S$GLB, | Performed by: NURSE PRACTITIONER

## 2023-05-02 PROCEDURE — 93010 EKG 12-LEAD: ICD-10-PCS | Mod: HCNC,S$GLB,, | Performed by: INTERNAL MEDICINE

## 2023-05-02 PROCEDURE — 3288F PR FALLS RISK ASSESSMENT DOCUMENTED: ICD-10-PCS | Mod: HCNC,CPTII,S$GLB, | Performed by: NURSE PRACTITIONER

## 2023-05-02 PROCEDURE — 93010 ELECTROCARDIOGRAM REPORT: CPT | Mod: HCNC,S$GLB,, | Performed by: INTERNAL MEDICINE

## 2023-05-02 PROCEDURE — 3079F PR MOST RECENT DIASTOLIC BLOOD PRESSURE 80-89 MM HG: ICD-10-PCS | Mod: HCNC,CPTII,S$GLB, | Performed by: NURSE PRACTITIONER

## 2023-05-02 PROCEDURE — 99999 PR PBB SHADOW E&M-EST. PATIENT-LVL V: CPT | Mod: PBBFAC,HCNC,, | Performed by: NURSE PRACTITIONER

## 2023-05-02 PROCEDURE — 3075F SYST BP GE 130 - 139MM HG: CPT | Mod: HCNC,CPTII,S$GLB, | Performed by: NURSE PRACTITIONER

## 2023-05-02 PROCEDURE — 1170F PR FUNCTIONAL STATUS ASSESSED: ICD-10-PCS | Mod: HCNC,CPTII,S$GLB, | Performed by: NURSE PRACTITIONER

## 2023-05-02 PROCEDURE — 1170F FXNL STATUS ASSESSED: CPT | Mod: HCNC,CPTII,S$GLB, | Performed by: NURSE PRACTITIONER

## 2023-05-02 PROCEDURE — 1101F PR PT FALLS ASSESS DOC 0-1 FALLS W/OUT INJ PAST YR: ICD-10-PCS | Mod: HCNC,CPTII,S$GLB, | Performed by: NURSE PRACTITIONER

## 2023-05-02 PROCEDURE — 3075F PR MOST RECENT SYSTOLIC BLOOD PRESS GE 130-139MM HG: ICD-10-PCS | Mod: HCNC,CPTII,S$GLB, | Performed by: NURSE PRACTITIONER

## 2023-05-02 PROCEDURE — 93005 ELECTROCARDIOGRAM TRACING: CPT | Mod: HCNC,S$GLB,, | Performed by: NURSE PRACTITIONER

## 2023-05-02 PROCEDURE — 1159F PR MEDICATION LIST DOCUMENTED IN MEDICAL RECORD: ICD-10-PCS | Mod: HCNC,CPTII,S$GLB, | Performed by: NURSE PRACTITIONER

## 2023-05-02 PROCEDURE — G0439 PR MEDICARE ANNUAL WELLNESS SUBSEQUENT VISIT: ICD-10-PCS | Mod: HCNC,S$GLB,, | Performed by: NURSE PRACTITIONER

## 2023-05-02 NOTE — PATIENT INSTRUCTIONS
Counseling and Referral of Other Preventative  (Italic type indicates deductible and co-insurance are waived)    Patient Name: Castillo Anderson  Today's Date: 5/2/2023    Health Maintenance       Date Due Completion Date    Shingles Vaccine (2 of 3) 11/26/2015 10/1/2015    COVID-19 Vaccine (4 - Booster for Pfizer series) 11/30/2021 10/5/2021    Influenza Vaccine (Season Ended) 09/01/2023 1/13/2022    Override on 11/13/2019: Done ([place])    Colonoscopy 02/19/2024 2/19/2021    Lipid Panel 05/11/2026 5/11/2021    TETANUS VACCINE 09/29/2026 9/29/2016 (Declined)    Override on 9/29/2016: Declined        No orders of the defined types were placed in this encounter.      The following information is provided to all patients.  This information is to help you find resources for any of the problems found today that may be affecting your health:                Living healthy guide: www.Count includes the [facility].[place].gov      Understanding Diabetes: www.diabetes.org      Eating healthy: www.cdc.gov/healthyweight      CDC home safety checklist: www.cdc.gov/steadi/patient.html      Agency on Aging: www.goea.[place].gov      Alcoholics anonymous (AA): www.aa.org      Physical Activity: www.pravin.nih.gov/bx5qenc      Tobacco use: www.quitwithusla.org

## 2023-05-02 NOTE — PROGRESS NOTES
"  Castillo Anderson presented for a  Medicare AWV and comprehensive Health Risk Assessment today. The following components were reviewed and updated:    Medical history  Family History  Social history  Allergies and Current Medications  Health Risk Assessment  Health Maintenance  Care Team     ** See Completed Assessments for Annual Wellness Visit within the encounter summary.**     The following assessments were completed:  Living Situation  CAGE  Depression Screening  Timed Get Up and Go  Whisper Test  Cognitive Function Screening      Nutrition Screening  ADL Screening  PAQ Screening    Review for Opioid Screening: Pt does not have Rx for Opioids  Review for Substance Use Disorders: Patient does not use substance      Vitals:    05/02/23 0829   BP: 136/80   BP Location: Left arm   Patient Position: Sitting   BP Method: Medium (Manual)   Pulse: 66   SpO2: 99%   Weight: 68.8 kg (151 lb 10.8 oz)   Height: 5' 6" (1.676 m)     Body mass index is 24.48 kg/m².  Physical Exam  Vitals reviewed.   Constitutional:       General: He is not in acute distress.  Pulmonary:      Effort: Pulmonary effort is normal. No respiratory distress.   Neurological:      Mental Status: He is alert and oriented to person, place, and time.   Psychiatric:         Mood and Affect: Mood normal.         Behavior: Behavior normal.         Thought Content: Thought content normal.         Judgment: Judgment normal.     Diagnoses and health risks identified today and associated recommendations/orders:    1. Encounter for preventive health examination  Reviewed and discussed health maintenance.    - CT Chest Lung Screening Low Dose; Future    2. Encounter for Medicare annual wellness exam  - Ambulatory Referral/Consult to Enhanced Annual Wellness Visit (eAWV)  - CT Chest Lung Screening Low Dose; Future    3. Moderate episode of recurrent major depressive disorder  Stable- continue current treatment and follow up routinely with PCP and psychiatry " ()    4. Chronic obstructive pulmonary disease, unspecified COPD type  Stable- continue current treatment and follow up routinely with PCP and pulmonary ()    5. Ulcerative colitis, universal, unspecified complication  Stable- continue current treatment and follow up routinely with PCP     6. Senile purpura  Stable- continue current treatment and follow up routinely with PCP     7. Atherosclerosis of aorta  Stable- continue current treatment and follow up routinely with PCP and cardiology ()    8. Atherosclerosis of native artery of left lower extremity with intermittent claudication  Stable- continue current treatment and follow up routinely with PCP and cardiology ()    9. PVD (peripheral vascular disease) - bilateral LE stents  Stable- continue current treatment and follow up routinely with PCP and cardiology ()    10. Mixed hyperlipidemia  Stable- continue current treatment and follow up routinely with PCP and cardiology ()    11. Arteriosclerosis of carotid artery, unspecified laterality  Stable- continue current treatment and follow up routinely with PCP and cardiology ()    12. Personal history of nicotine dependence  - CT Chest Lung Screening Low Dose; Future    13. Chest pain, unspecified type  No symptoms currently. Off and on for a month or more. Intermittently throughout a day. No association with activity  Follow up routinely with PCP and cardiology ()  - Ambulatory referral/consult to Cardiology; Future  - IN OFFICE EKG 12-LEAD (to [place])  ER precautions given. Pt verbalized an understanding.     Provided Castillo with a 5-10 year written screening schedule and personal prevention plan. Recommendations were developed using the USPSTF age appropriate recommendations. Education, counseling, and referrals were provided as needed. After Visit Summary printed and given to patient which includes a list of additional  screenings\tests needed.    I offered to discuss advanced care planning, including how to pick a person who would make decisions for you if you were unable to make them for yourself, called a health care power of , and what kind of decisions you might make such as use of life sustaining treatments such as ventilators and tube feeding when faced with a life limiting illness recorded on a living will that they will need to know. (How you want to be cared for as you near the end of your natural life)   X Patient is interested in learning more about how to make advanced directives.  I provided them paperwork and offered to discuss this with them.  Cindi Wood, NP

## 2023-05-04 ENCOUNTER — HOSPITAL ENCOUNTER (OUTPATIENT)
Dept: RADIOLOGY | Facility: HOSPITAL | Age: 78
Discharge: HOME | End: 2023-05-04
Attending: NURSE PRACTITIONER
Payer: MEDICARE

## 2023-05-04 DIAGNOSIS — Z87.891 PERSONAL HISTORY OF NICOTINE DEPENDENCE: ICD-10-CM

## 2023-05-04 DIAGNOSIS — Z00.00 ENCOUNTER FOR MEDICARE ANNUAL WELLNESS EXAM: ICD-10-CM

## 2023-05-04 DIAGNOSIS — Z00.00 ENCOUNTER FOR PREVENTIVE HEALTH EXAMINATION: ICD-10-CM

## 2023-05-04 PROCEDURE — 71271 CT CHEST LUNG SCREENING LOW DOSE: ICD-10-PCS | Mod: 26,HCNC,, | Performed by: RADIOLOGY

## 2023-05-04 PROCEDURE — 71271 CT THORAX LUNG CANCER SCR C-: CPT | Mod: 26,HCNC,, | Performed by: RADIOLOGY

## 2023-05-04 PROCEDURE — 71271 CT THORAX LUNG CANCER SCR C-: CPT | Mod: TC,HCNC,PO

## 2023-05-11 ENCOUNTER — OFFICE VISIT (OUTPATIENT)
Dept: OPTOMETRY | Facility: CLINIC | Age: 78
End: 2023-05-11
Payer: MEDICARE

## 2023-05-11 DIAGNOSIS — Z96.1 PSEUDOPHAKIA OF BOTH EYES: ICD-10-CM

## 2023-05-11 DIAGNOSIS — H52.13 MYOPIA WITH ASTIGMATISM AND PRESBYOPIA, BILATERAL: ICD-10-CM

## 2023-05-11 DIAGNOSIS — H52.203 MYOPIA WITH ASTIGMATISM AND PRESBYOPIA, BILATERAL: ICD-10-CM

## 2023-05-11 DIAGNOSIS — H52.4 MYOPIA WITH ASTIGMATISM AND PRESBYOPIA, BILATERAL: ICD-10-CM

## 2023-05-11 DIAGNOSIS — H43.812 POSTERIOR VITREOUS DETACHMENT OF LEFT EYE: Primary | ICD-10-CM

## 2023-05-11 PROCEDURE — 1159F MED LIST DOCD IN RCRD: CPT | Mod: HCNC,CPTII,S$GLB,

## 2023-05-11 PROCEDURE — 92015 PR REFRACTION: ICD-10-PCS | Mod: HCNC,S$GLB,,

## 2023-05-11 PROCEDURE — 1126F PR PAIN SEVERITY QUANTIFIED, NO PAIN PRESENT: ICD-10-PCS | Mod: HCNC,CPTII,S$GLB,

## 2023-05-11 PROCEDURE — 3288F PR FALLS RISK ASSESSMENT DOCUMENTED: ICD-10-PCS | Mod: HCNC,CPTII,S$GLB,

## 2023-05-11 PROCEDURE — 92015 DETERMINE REFRACTIVE STATE: CPT | Mod: HCNC,S$GLB,,

## 2023-05-11 PROCEDURE — 1159F PR MEDICATION LIST DOCUMENTED IN MEDICAL RECORD: ICD-10-PCS | Mod: HCNC,CPTII,S$GLB,

## 2023-05-11 PROCEDURE — 99999 PR PBB SHADOW E&M-EST. PATIENT-LVL III: CPT | Mod: PBBFAC,HCNC,,

## 2023-05-11 PROCEDURE — 92004 COMPRE OPH EXAM NEW PT 1/>: CPT | Mod: HCNC,S$GLB,,

## 2023-05-11 PROCEDURE — 1101F PR PT FALLS ASSESS DOC 0-1 FALLS W/OUT INJ PAST YR: ICD-10-PCS | Mod: HCNC,CPTII,S$GLB,

## 2023-05-11 PROCEDURE — 1101F PT FALLS ASSESS-DOCD LE1/YR: CPT | Mod: HCNC,CPTII,S$GLB,

## 2023-05-11 PROCEDURE — 3288F FALL RISK ASSESSMENT DOCD: CPT | Mod: HCNC,CPTII,S$GLB,

## 2023-05-11 PROCEDURE — 92004 PR EYE EXAM, NEW PATIENT,COMPREHESV: ICD-10-PCS | Mod: HCNC,S$GLB,,

## 2023-05-11 PROCEDURE — 1126F AMNT PAIN NOTED NONE PRSNT: CPT | Mod: HCNC,CPTII,S$GLB,

## 2023-05-11 PROCEDURE — 99999 PR PBB SHADOW E&M-EST. PATIENT-LVL III: ICD-10-PCS | Mod: PBBFAC,HCNC,,

## 2023-05-11 NOTE — PROGRESS NOTES
HPI    New pt there for eye exam. Last exam- 2013    Pt sts he did not notice a change in vision after cat sx. Pt sts he has   worn glasses in the past but lost them. Pt currently wearing OTC readers,   +2.50. Pt denies flashes/floaters/pain. Pt denies use of gtt.   Last edited by Therese Garcia, OD on 5/11/2023  9:39 AM.            Assessment /Plan     For exam results, see Encounter Report.    Posterior vitreous detachment of left eye    Pseudophakia of both eyes    Myopia with astigmatism and presbyopia, bilateral      Ed pt on etiology of PVD and on signs and symptoms of RD. Ed to return asap if experienced.  Stable with open capsules. Pt noticing a slow decline in vision since cataract surgery, correctable with spec rx. Monitor yearly for changes.  Discussed spectacle options with pt and released final spec rx. Ed pt on change in rx and adaptation.    RTC: 1 year for comprehensive exam or sooner prn

## 2023-08-03 ENCOUNTER — TELEPHONE (OUTPATIENT)
Dept: NEUROLOGY | Facility: CLINIC | Age: 78
End: 2023-08-03
Payer: MEDICARE

## 2023-08-09 ENCOUNTER — PATIENT MESSAGE (OUTPATIENT)
Dept: ADMINISTRATIVE | Facility: HOSPITAL | Age: 78
End: 2023-08-09
Payer: MEDICARE

## 2023-09-01 NOTE — TELEPHONE ENCOUNTER
----- Message from Falguni Lawton MD sent at 11/8/2019  3:15 PM CST -----  Please call patient and inform of NMSC diagnoses. Recommend patient to Dr. Neves for Mohs surgery consultation for lesion 1. Picture in chart.  Lesion 2: Recommend RTC to discuss treatment options with me in mid Dec    1. Skin, right medial calf, shave biopsy:  - INVASIVE SQUAMOUS CELL CARCINOMA, WELL-DIFFERENTIATED, KERATOACANTHOMA TYPE.  - THE DEEP BIOPSY EDGE IS INVOLVED.  2. Skin, left forearm, shave biopsy:  - INVASIVE SQUAMOUS CELL CARCINOMA, WELL-DIFFERENTIATED, KERATOACANTHOMA TYPE.  - THE TUMOR EXTENDS TO THE DEEP AND LATERAL BIOPSY MARGINS.  [facility]  Diagnosed by: Savannah Earl     Curettage Text: The wound bed was treated with curettage after the biopsy was performed.

## 2023-09-06 ENCOUNTER — OFFICE VISIT (OUTPATIENT)
Dept: FAMILY MEDICINE | Facility: CLINIC | Age: 78
End: 2023-09-06
Payer: MEDICARE

## 2023-09-06 ENCOUNTER — LAB VISIT (OUTPATIENT)
Dept: LAB | Facility: HOSPITAL | Age: 78
End: 2023-09-06
Attending: NURSE PRACTITIONER
Payer: MEDICARE

## 2023-09-06 VITALS
DIASTOLIC BLOOD PRESSURE: 64 MMHG | BODY MASS INDEX: 24.41 KG/M2 | WEIGHT: 151.88 LBS | HEIGHT: 66 IN | HEART RATE: 91 BPM | TEMPERATURE: 98 F | SYSTOLIC BLOOD PRESSURE: 132 MMHG | OXYGEN SATURATION: 95 %

## 2023-09-06 DIAGNOSIS — J44.9 CHRONIC OBSTRUCTIVE PULMONARY DISEASE, UNSPECIFIED COPD TYPE: ICD-10-CM

## 2023-09-06 DIAGNOSIS — I73.9 PVD (PERIPHERAL VASCULAR DISEASE): ICD-10-CM

## 2023-09-06 DIAGNOSIS — E78.5 HYPERLIPIDEMIA, UNSPECIFIED HYPERLIPIDEMIA TYPE: Primary | ICD-10-CM

## 2023-09-06 DIAGNOSIS — E78.5 HYPERLIPIDEMIA, UNSPECIFIED HYPERLIPIDEMIA TYPE: ICD-10-CM

## 2023-09-06 DIAGNOSIS — N40.0 BENIGN PROSTATIC HYPERPLASIA, UNSPECIFIED WHETHER LOWER URINARY TRACT SYMPTOMS PRESENT: ICD-10-CM

## 2023-09-06 DIAGNOSIS — G47.33 OSA ON CPAP: ICD-10-CM

## 2023-09-06 LAB
ALBUMIN SERPL BCP-MCNC: 4.1 G/DL (ref 3.5–5.2)
ALP SERPL-CCNC: 62 U/L (ref 55–135)
ALT SERPL W/O P-5'-P-CCNC: 32 U/L (ref 10–44)
ANION GAP SERPL CALC-SCNC: 10 MMOL/L (ref 8–16)
AST SERPL-CCNC: 29 U/L (ref 10–40)
BASOPHILS # BLD AUTO: 0.04 K/UL (ref 0–0.2)
BASOPHILS NFR BLD: 0.6 % (ref 0–1.9)
BILIRUB SERPL-MCNC: 0.4 MG/DL (ref 0.1–1)
BUN SERPL-MCNC: 21 MG/DL (ref 8–23)
CALCIUM SERPL-MCNC: 9.3 MG/DL (ref 8.7–10.5)
CHLORIDE SERPL-SCNC: 105 MMOL/L (ref 95–110)
CHOLEST SERPL-MCNC: 133 MG/DL (ref 120–199)
CHOLEST/HDLC SERPL: 2.1 {RATIO} (ref 2–5)
CO2 SERPL-SCNC: 27 MMOL/L (ref 23–29)
CREAT SERPL-MCNC: 1 MG/DL (ref 0.5–1.4)
DIFFERENTIAL METHOD: ABNORMAL
EOSINOPHIL # BLD AUTO: 0.2 K/UL (ref 0–0.5)
EOSINOPHIL NFR BLD: 2.7 % (ref 0–8)
ERYTHROCYTE [DISTWIDTH] IN BLOOD BY AUTOMATED COUNT: 17.5 % (ref 11.5–14.5)
EST. GFR  (NO RACE VARIABLE): >60 ML/MIN/1.73 M^2
GLUCOSE SERPL-MCNC: 71 MG/DL (ref 70–110)
HCT VFR BLD AUTO: 51.1 % (ref 40–54)
HDLC SERPL-MCNC: 62 MG/DL (ref 40–75)
HDLC SERPL: 46.6 % (ref 20–50)
HGB BLD-MCNC: 16.9 G/DL (ref 14–18)
IMM GRANULOCYTES # BLD AUTO: 0.06 K/UL (ref 0–0.04)
IMM GRANULOCYTES NFR BLD AUTO: 0.8 % (ref 0–0.5)
LDLC SERPL CALC-MCNC: 57.4 MG/DL (ref 63–159)
LYMPHOCYTES # BLD AUTO: 2.2 K/UL (ref 1–4.8)
LYMPHOCYTES NFR BLD: 30.5 % (ref 18–48)
MCH RBC QN AUTO: 28.7 PG (ref 27–31)
MCHC RBC AUTO-ENTMCNC: 33.1 G/DL (ref 32–36)
MCV RBC AUTO: 87 FL (ref 82–98)
MONOCYTES # BLD AUTO: 0.8 K/UL (ref 0.3–1)
MONOCYTES NFR BLD: 11.1 % (ref 4–15)
NEUTROPHILS # BLD AUTO: 3.9 K/UL (ref 1.8–7.7)
NEUTROPHILS NFR BLD: 54.3 % (ref 38–73)
NONHDLC SERPL-MCNC: 71 MG/DL
NRBC BLD-RTO: 0 /100 WBC
PLATELET # BLD AUTO: 167 K/UL (ref 150–450)
PMV BLD AUTO: 10.1 FL (ref 9.2–12.9)
POTASSIUM SERPL-SCNC: 3.9 MMOL/L (ref 3.5–5.1)
PROT SERPL-MCNC: 7.2 G/DL (ref 6–8.4)
RBC # BLD AUTO: 5.89 M/UL (ref 4.6–6.2)
SODIUM SERPL-SCNC: 142 MMOL/L (ref 136–145)
TRIGL SERPL-MCNC: 68 MG/DL (ref 30–150)
TSH SERPL DL<=0.005 MIU/L-ACNC: 1.2 UIU/ML (ref 0.4–4)
WBC # BLD AUTO: 7.12 K/UL (ref 3.9–12.7)

## 2023-09-06 PROCEDURE — 80053 COMPREHEN METABOLIC PANEL: CPT | Mod: HCNC | Performed by: NURSE PRACTITIONER

## 2023-09-06 PROCEDURE — 84443 ASSAY THYROID STIM HORMONE: CPT | Mod: HCNC | Performed by: NURSE PRACTITIONER

## 2023-09-06 PROCEDURE — G0008 ADMIN INFLUENZA VIRUS VAC: HCPCS | Mod: HCNC,S$GLB,, | Performed by: NURSE PRACTITIONER

## 2023-09-06 PROCEDURE — 3078F DIAST BP <80 MM HG: CPT | Mod: HCNC,CPTII,S$GLB, | Performed by: NURSE PRACTITIONER

## 2023-09-06 PROCEDURE — 3075F SYST BP GE 130 - 139MM HG: CPT | Mod: HCNC,CPTII,S$GLB, | Performed by: NURSE PRACTITIONER

## 2023-09-06 PROCEDURE — 36415 COLL VENOUS BLD VENIPUNCTURE: CPT | Mod: HCNC,PO | Performed by: NURSE PRACTITIONER

## 2023-09-06 PROCEDURE — 99214 OFFICE O/P EST MOD 30 MIN: CPT | Mod: 25,HCNC,S$GLB, | Performed by: NURSE PRACTITIONER

## 2023-09-06 PROCEDURE — 90694 FLU VACCINE - QUADRIVALENT - ADJUVANTED: ICD-10-PCS | Mod: HCNC,S$GLB,, | Performed by: NURSE PRACTITIONER

## 2023-09-06 PROCEDURE — 1159F MED LIST DOCD IN RCRD: CPT | Mod: HCNC,CPTII,S$GLB, | Performed by: NURSE PRACTITIONER

## 2023-09-06 PROCEDURE — 99999 PR PBB SHADOW E&M-EST. PATIENT-LVL IV: CPT | Mod: PBBFAC,HCNC,, | Performed by: NURSE PRACTITIONER

## 2023-09-06 PROCEDURE — 3288F FALL RISK ASSESSMENT DOCD: CPT | Mod: HCNC,CPTII,S$GLB, | Performed by: NURSE PRACTITIONER

## 2023-09-06 PROCEDURE — 1126F AMNT PAIN NOTED NONE PRSNT: CPT | Mod: HCNC,CPTII,S$GLB, | Performed by: NURSE PRACTITIONER

## 2023-09-06 PROCEDURE — 90694 VACC AIIV4 NO PRSRV 0.5ML IM: CPT | Mod: HCNC,S$GLB,, | Performed by: NURSE PRACTITIONER

## 2023-09-06 PROCEDURE — 3078F PR MOST RECENT DIASTOLIC BLOOD PRESSURE < 80 MM HG: ICD-10-PCS | Mod: HCNC,CPTII,S$GLB, | Performed by: NURSE PRACTITIONER

## 2023-09-06 PROCEDURE — G0008 FLU VACCINE - QUADRIVALENT - ADJUVANTED: ICD-10-PCS | Mod: HCNC,S$GLB,, | Performed by: NURSE PRACTITIONER

## 2023-09-06 PROCEDURE — 99214 PR OFFICE/OUTPT VISIT, EST, LEVL IV, 30-39 MIN: ICD-10-PCS | Mod: 25,HCNC,S$GLB, | Performed by: NURSE PRACTITIONER

## 2023-09-06 PROCEDURE — 3075F PR MOST RECENT SYSTOLIC BLOOD PRESS GE 130-139MM HG: ICD-10-PCS | Mod: HCNC,CPTII,S$GLB, | Performed by: NURSE PRACTITIONER

## 2023-09-06 PROCEDURE — 85025 COMPLETE CBC W/AUTO DIFF WBC: CPT | Mod: HCNC | Performed by: NURSE PRACTITIONER

## 2023-09-06 PROCEDURE — 3288F PR FALLS RISK ASSESSMENT DOCUMENTED: ICD-10-PCS | Mod: HCNC,CPTII,S$GLB, | Performed by: NURSE PRACTITIONER

## 2023-09-06 PROCEDURE — 1101F PR PT FALLS ASSESS DOC 0-1 FALLS W/OUT INJ PAST YR: ICD-10-PCS | Mod: HCNC,CPTII,S$GLB, | Performed by: NURSE PRACTITIONER

## 2023-09-06 PROCEDURE — 80061 LIPID PANEL: CPT | Mod: HCNC | Performed by: NURSE PRACTITIONER

## 2023-09-06 PROCEDURE — 99999 PR PBB SHADOW E&M-EST. PATIENT-LVL IV: ICD-10-PCS | Mod: PBBFAC,HCNC,, | Performed by: NURSE PRACTITIONER

## 2023-09-06 PROCEDURE — 1126F PR PAIN SEVERITY QUANTIFIED, NO PAIN PRESENT: ICD-10-PCS | Mod: HCNC,CPTII,S$GLB, | Performed by: NURSE PRACTITIONER

## 2023-09-06 PROCEDURE — 1159F PR MEDICATION LIST DOCUMENTED IN MEDICAL RECORD: ICD-10-PCS | Mod: HCNC,CPTII,S$GLB, | Performed by: NURSE PRACTITIONER

## 2023-09-06 PROCEDURE — 1101F PT FALLS ASSESS-DOCD LE1/YR: CPT | Mod: HCNC,CPTII,S$GLB, | Performed by: NURSE PRACTITIONER

## 2023-09-06 NOTE — PROGRESS NOTES
Subjective:       Patient ID: Castillo Anderson Jr. is a 77 y.o. male.    Chief Complaint: Annual Exam    HPI  Patient presents for routine checkup  Without complaints    COPD, pulmonary nodules: Followed by [facility]  RODNEY on CPAP    Hoarseness 2/2 left vocal cord paralysis     BPH, elevated PSA: followed by Dr Gao    HLD: on crestor   PVD: stents to both LE. No claudication   Carotid artery disease s/p left endarterectomy 2016  Does not have cardiologist     Vitals:    09/06/23 1351   BP: 132/64   Pulse: 91   Temp: 98.3 °F (36.8 °C)     Review of Systems   Constitutional:  Negative for fever.   Respiratory:  Negative for cough and shortness of breath.    Cardiovascular:  Negative for chest pain and leg swelling.       Past Medical History:   Diagnosis Date    Cataract     OU    Claudication of lower extremity     Colon polyp     COPD (chronic obstructive pulmonary disease)     Hx of mumps     Hyperlipidemia     Kidney stone     Left carotid stenosis     procedure done to fix    Lumbar disc disorder     PVD (peripheral vascular disease)     Sleep apnea     uses cpap    Tobacco dependence      Objective:      Physical Exam  Vitals and nursing note reviewed.   Constitutional:       General: He is not in acute distress.     Appearance: He is not diaphoretic.   HENT:      Head: Normocephalic.   Eyes:      General: Lids are normal.         Right eye: No discharge.         Left eye: No discharge.   Neck:      Trachea: No tracheal deviation.   Cardiovascular:      Rate and Rhythm: Normal rate.      Heart sounds: Normal heart sounds.   Pulmonary:      Effort: Pulmonary effort is normal.      Breath sounds: Normal breath sounds.   Skin:     Coloration: Skin is not pale.   Neurological:      Mental Status: He is alert and oriented to person, place, and time.   Psychiatric:         Speech: Speech normal.         Behavior: Behavior normal.         Thought Content: Thought content normal.         Judgment: Judgment  normal.         Assessment:       1. Hyperlipidemia, unspecified hyperlipidemia type    2. Chronic obstructive pulmonary disease, unspecified COPD type    3. RODNEY on CPAP    4. Benign prostatic hyperplasia, unspecified whether lower urinary tract symptoms present    5. PVD (peripheral vascular disease)        Plan:       Hyperlipidemia, unspecified hyperlipidemia type  -     CBC Auto Differential; Future; Expected date: 09/06/2023  -     Comprehensive Metabolic Panel; Future; Expected date: 09/06/2023  -     Lipid Panel; Future; Expected date: 02/06/2024  -     TSH; Future; Expected date: 09/06/2023  -     Ambulatory referral/consult to Cardiology; Future; Expected date: 09/13/2023    Chronic obstructive pulmonary disease, unspecified COPD type    RODNEY on CPAP    Benign prostatic hyperplasia, unspecified whether lower urinary tract symptoms present    PVD (peripheral vascular disease)  -     Ambulatory referral/consult to Cardiology; Future; Expected date: 09/13/2023      FU to establish with cardiology, new PCP      Medication List with Changes/Refills   Current Medications    CLOPIDOGREL (PLAVIX) 75 MG TABLET    TAKE 1 TABLET EVERY DAY    DULOXETINE (CYMBALTA) 60 MG CAPSULE    Take 1 capsule every day by oral route.    ESCITALOPRAM OXALATE (LEXAPRO) 10 MG TABLET    Take 1 tablet every day by oral route for 90 days.    EZETIMIBE (ZETIA) 10 MG TABLET    TAKE 1 TABLET EVERY DAY    FLUTICASONE-UMECLIDIN-VILANTER (TRELEGY ELLIPTA) 100-62.5-25 MCG DSDV    Inhale 1 puff into the lungs once daily.    MULTIVIT WITH MINERALS/LUTEIN (MULTIVITAMIN 50 PLUS ORAL)    multivitamin    NITROGLYCERIN (NITROSTAT) 0.4 MG SL TABLET    nitroglycerin 0.4 mg sublingual tablet    ROSUVASTATIN (CRESTOR) 20 MG TABLET    TAKE 1 TABLET EVERY DAY    SERTRALINE (ZOLOFT) 50 MG TABLET        TRAZODONE (DESYREL) 50 MG TABLET    Take 50 mg by mouth every evening. Every day

## 2023-09-19 ENCOUNTER — TELEPHONE (OUTPATIENT)
Dept: PAIN MEDICINE | Facility: CLINIC | Age: 78
End: 2023-09-19
Payer: MEDICARE

## 2023-09-19 ENCOUNTER — OFFICE VISIT (OUTPATIENT)
Dept: PAIN MEDICINE | Facility: CLINIC | Age: 78
End: 2023-09-19
Payer: MEDICARE

## 2023-09-19 VITALS
BODY MASS INDEX: 24.77 KG/M2 | HEART RATE: 72 BPM | WEIGHT: 154.13 LBS | SYSTOLIC BLOOD PRESSURE: 144 MMHG | HEIGHT: 66 IN | DIASTOLIC BLOOD PRESSURE: 69 MMHG

## 2023-09-19 DIAGNOSIS — M54.12 CERVICAL RADICULOPATHY: Primary | ICD-10-CM

## 2023-09-19 PROCEDURE — 3288F FALL RISK ASSESSMENT DOCD: CPT | Mod: HCNC,CPTII,S$GLB, | Performed by: PHYSICIAN ASSISTANT

## 2023-09-19 PROCEDURE — 3288F PR FALLS RISK ASSESSMENT DOCUMENTED: ICD-10-PCS | Mod: HCNC,CPTII,S$GLB, | Performed by: PHYSICIAN ASSISTANT

## 2023-09-19 PROCEDURE — 1125F PR PAIN SEVERITY QUANTIFIED, PAIN PRESENT: ICD-10-PCS | Mod: HCNC,CPTII,S$GLB, | Performed by: PHYSICIAN ASSISTANT

## 2023-09-19 PROCEDURE — 3077F SYST BP >= 140 MM HG: CPT | Mod: HCNC,CPTII,S$GLB, | Performed by: PHYSICIAN ASSISTANT

## 2023-09-19 PROCEDURE — 3077F PR MOST RECENT SYSTOLIC BLOOD PRESSURE >= 140 MM HG: ICD-10-PCS | Mod: HCNC,CPTII,S$GLB, | Performed by: PHYSICIAN ASSISTANT

## 2023-09-19 PROCEDURE — 1160F RVW MEDS BY RX/DR IN RCRD: CPT | Mod: HCNC,CPTII,S$GLB, | Performed by: PHYSICIAN ASSISTANT

## 2023-09-19 PROCEDURE — 99203 OFFICE O/P NEW LOW 30 MIN: CPT | Mod: HCNC,S$GLB,, | Performed by: PHYSICIAN ASSISTANT

## 2023-09-19 PROCEDURE — 1160F PR REVIEW ALL MEDS BY PRESCRIBER/CLIN PHARMACIST DOCUMENTED: ICD-10-PCS | Mod: HCNC,CPTII,S$GLB, | Performed by: PHYSICIAN ASSISTANT

## 2023-09-19 PROCEDURE — 3078F PR MOST RECENT DIASTOLIC BLOOD PRESSURE < 80 MM HG: ICD-10-PCS | Mod: HCNC,CPTII,S$GLB, | Performed by: PHYSICIAN ASSISTANT

## 2023-09-19 PROCEDURE — 1159F MED LIST DOCD IN RCRD: CPT | Mod: HCNC,CPTII,S$GLB, | Performed by: PHYSICIAN ASSISTANT

## 2023-09-19 PROCEDURE — 99999 PR PBB SHADOW E&M-EST. PATIENT-LVL III: ICD-10-PCS | Mod: PBBFAC,HCNC,, | Performed by: PHYSICIAN ASSISTANT

## 2023-09-19 PROCEDURE — 1101F PT FALLS ASSESS-DOCD LE1/YR: CPT | Mod: HCNC,CPTII,S$GLB, | Performed by: PHYSICIAN ASSISTANT

## 2023-09-19 PROCEDURE — 3078F DIAST BP <80 MM HG: CPT | Mod: HCNC,CPTII,S$GLB, | Performed by: PHYSICIAN ASSISTANT

## 2023-09-19 PROCEDURE — 1125F AMNT PAIN NOTED PAIN PRSNT: CPT | Mod: HCNC,CPTII,S$GLB, | Performed by: PHYSICIAN ASSISTANT

## 2023-09-19 PROCEDURE — 99999 PR PBB SHADOW E&M-EST. PATIENT-LVL III: CPT | Mod: PBBFAC,HCNC,, | Performed by: PHYSICIAN ASSISTANT

## 2023-09-19 PROCEDURE — 1159F PR MEDICATION LIST DOCUMENTED IN MEDICAL RECORD: ICD-10-PCS | Mod: HCNC,CPTII,S$GLB, | Performed by: PHYSICIAN ASSISTANT

## 2023-09-19 PROCEDURE — 99203 PR OFFICE/OUTPT VISIT, NEW, LEVL III, 30-44 MIN: ICD-10-PCS | Mod: HCNC,S$GLB,, | Performed by: PHYSICIAN ASSISTANT

## 2023-09-19 PROCEDURE — 1101F PR PT FALLS ASSESS DOC 0-1 FALLS W/OUT INJ PAST YR: ICD-10-PCS | Mod: HCNC,CPTII,S$GLB, | Performed by: PHYSICIAN ASSISTANT

## 2023-09-19 RX ORDER — METHYLPREDNISOLONE 4 MG/1
TABLET ORAL
Qty: 1 EACH | Refills: 0 | Status: SHIPPED | OUTPATIENT
Start: 2023-09-19 | End: 2023-10-10

## 2023-09-19 NOTE — TELEPHONE ENCOUNTER
----- Message from Angie Murphy sent at 9/19/2023  4:07 PM CDT -----  Contact: Self  Patient is calling in regards to his appt today, stated Niyah Tellez PA-C was supposed to send a rx over to his pharmacy and they have not received anything yet today. Can we please check on this and have this sent to:     [id number] [place] [address] AT [facility] [address] [address]  Phone: [phone] Fax: [phone]    And please call pt back to advise at [phone]. Thank You.     Problem: Communication  Goal: The ability to communicate needs accurately and effectively will improve  Outcome: PROGRESSING AS EXPECTED  Call light with in reach, pt able to demonstrate proper use.    Problem: Venous Thromboembolism (VTW)/Deep Vein Thrombosis (DVT) Prevention:  Goal: Patient will participate in Venous Thrombosis (VTE)/Deep Vein Thrombosis (DVT)Prevention Measures  Outcome: PROGRESSING AS EXPECTED  Patient monitored on Heparin drip. Currently at therapeutic level.

## 2023-09-19 NOTE — TELEPHONE ENCOUNTER
Justin was notified that Niyah Tellez sent the medication to the pharmacy, he indicated understanding.

## 2023-09-20 NOTE — PROGRESS NOTES
[facility] New Patient Evaluation      Referred by: Moodyreferral Self    PCP:  Frantz Lemos MD    CC:   Chief Complaint   Patient presents with    Shoulder Pain     Left-sided          HPI:   Castillo Anderson Jr. is a 77 y.o. year old male patient who has a past medical history of Cataract, Claudication of lower extremity, Colon polyp, COPD (chronic obstructive pulmonary disease), mumps, Hyperlipidemia, Kidney stone, Left carotid stenosis, Lumbar disc disorder, PVD (peripheral vascular disease), Sleep apnea, and Tobacco dependence. He presents in self referral for left shoulder an arm pain, numbness.  He is left handed.   For a few months has had felt pin in the left clavicle and shoulder region that is intermittent, varying in intensity.  With time he has also felt numbness, eadening senstaion in the hand, and all fingers.      Denies bowel/ bladder incontinence.    He has had cardiology work up and orthopedic worke up.  No cardiology cause found.  No benefit with recent right shoulder injection.       Past and current medications:  Antineuropathics:  NSAIDs:  aleve  Antidepressants:  lexapro  Muscle relaxers:  Opioids:  Antiplatelets/Anticoagulants:  plavix  Others:  tylenol.      Physical Therapy/ Chiropractic care:  None      Pain Intervention History:  none    Past Spine Surgical History:  Lumbar surger 2012        History:    Current Outpatient Medications:     clopidogreL (PLAVIX) 75 mg tablet, TAKE 1 TABLET EVERY DAY, Disp: 90 tablet, Rfl: 3    DULoxetine (CYMBALTA) 60 MG capsule, Take 1 capsule every day by oral route., Disp: , Rfl:     EScitalopram oxalate (LEXAPRO) 10 MG tablet, Take 1 tablet every day by oral route for 90 days., Disp: , Rfl:     ezetimibe (ZETIA) 10 mg tablet, TAKE 1 TABLET EVERY DAY, Disp: 90 tablet, Rfl: 3    fluticasone-umeclidin-vilanter (TRELEGY ELLIPTA) 100-62.5-25 mcg DsDv, Inhale 1 puff into the lungs once daily., Disp: 90 each, Rfl: 3    multivit with  minerals/lutein (MULTIVITAMIN 50 PLUS ORAL), multivitamin, Disp: , Rfl:     nitroGLYCERIN (NITROSTAT) 0.4 MG SL tablet, nitroglycerin 0.4 mg sublingual tablet, Disp: , Rfl:     rosuvastatin (CRESTOR) 20 MG tablet, TAKE 1 TABLET EVERY DAY, Disp: 90 tablet, Rfl: 3    sertraline (ZOLOFT) 50 MG tablet, , Disp: , Rfl:     trazodone (DESYREL) 50 MG tablet, Take 50 mg by mouth every evening. Every day, Disp: , Rfl:     methylPREDNISolone (MEDROL, LENORE,) 4 mg tablet, use as directed, Disp: 1 each, Rfl: 0    Past Medical History:   Diagnosis Date    Cataract     OU    Claudication of lower extremity     Colon polyp     COPD (chronic obstructive pulmonary disease)     Hx of mumps     Hyperlipidemia     Kidney stone     Left carotid stenosis     procedure done to fix    Lumbar disc disorder     PVD (peripheral vascular disease)     Sleep apnea     uses cpap    Tobacco dependence        Past Surgical History:   Procedure Laterality Date    CAROTID ENDARTERECTOMY Left 2015    after 6/2015    CAROTID ENDARTERECTOMY Left 06/2016    CATARACT EXTRACTION Bilateral     COLONOSCOPY  03/05/2013    Dr. Morton, repeat in 5 years for surveillance    COLONOSCOPY  2007    in [facility]    COLONOSCOPY N/A 5/15/2018    Procedure: COLONOSCOPY;  Surgeon: Quentin Morton MD;  Location: [facility] ENDO;  Service: Endoscopy;  Laterality: N/A; Erythematous, granular and inflamed mucosa in the entire colon; biopsy: colon- Chronic active colitis with cryptitis and crypt abscesses. 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    COLONOSCOPY N/A 2/19/2021    Procedure: COLONOSCOPY;  Surgeon: Rishi Nino MD;  Location: [facility] ENDO;  Service: Endoscopy;  Laterality: N/A;    COMPUTED TOMOGRAPHY ANGIOGRAM Left 05/26/2016    Leg    EPIDURAL BLOCK INJECTION      EYE SURGERY      FEMORAL ARTERY STENT  2010    FEMORAL ARTERY STENT Left 05/26/2016    LAMINECTOMY  2012    left sup fem and prox popliteal stents  9/30/15    x 2    TONSILLECTOMY       UVULECTOMY         Family History   Problem Relation Age of Onset    Cataracts Mother     Alzheimer's disease Mother     Cancer Father         prostate    Cataracts Sister     Amblyopia Neg Hx     Blindness Neg Hx     Diabetes Neg Hx     Glaucoma Neg Hx     Hypertension Neg Hx     Macular degeneration Neg Hx     Retinal detachment Neg Hx     Strabismus Neg Hx     Stroke Neg Hx     Thyroid disease Neg Hx     Colon cancer Neg Hx     Colon polyps Neg Hx     Crohn's disease Neg Hx     Celiac disease Neg Hx     Ulcerative colitis Neg Hx        Social History     Socioeconomic History    Marital status:    Tobacco Use    Smoking status: Every Day     Current packs/day: 1.00     Average packs/day: 1 pack/day for 55.7 years (55.7 ttl pk-yrs)     Types: Cigarettes     Start date: 1968    Smokeless tobacco: Never    Tobacco comments:     vapeing   Substance and Sexual Activity    Alcohol use: Yes     Alcohol/week: 0.0 - 2.0 standard drinks of alcohol     Comment: per week    Drug use: No     Social Determinants of Health     Financial Resource Strain: Low Risk  (5/2/2023)    Overall Financial Resource Strain (CARDIA)     Difficulty of Paying Living Expenses: Not hard at all   Food Insecurity: No Food Insecurity (5/2/2023)    Hunger Vital Sign     Worried About Running Out of Food in the Last Year: Never true     Ran Out of Food in the Last Year: Never true   Transportation Needs: No Transportation Needs (5/2/2023)    PRAPARE - Transportation     Lack of Transportation (Medical): No     Lack of Transportation (Non-Medical): No   Physical Activity: Unknown (5/2/2023)    Exercise Vital Sign     Days of Exercise per Week: 5 days   Stress: Stress Concern Present (5/2/2023)    [employer] [place] [employer] - Occupational Stress Questionnaire     Feeling of Stress : To some extent   Social Connections: Moderately Integrated (5/2/2023)    Social Connection and Isolation Panel [NHANES]     Frequency of  "Communication with Friends and Family: More than three times a week     Frequency of Social Gatherings with Friends and Family: More than three times a week     Attends Rastafari Services: More than 4 times per year     Active Member of Clubs or Organizations: No     Attends Club or Organization Meetings: Never     Marital Status:    Housing Stability: Low Risk  (5/2/2023)    Housing Stability Vital Sign     Unable to Pay for Housing in the Last Year: No     Number of Places Lived in the Last Year: 1     Unstable Housing in the Last Year: No       Review of patient's allergies indicates:   Allergen Reactions    No known drug allergies        Labs:  No results found for: "LABA1C", "HGBA1C"    Lab Results   Component Value Date    WBC 7.12 09/06/2023    HGB 16.9 09/06/2023    HCT 51.1 09/06/2023    MCV 87 09/06/2023     09/06/2023           Review of Systems:  Left neck, shoulder pain.  Left arm apin. .  Balance of review of systems is negative.    Physical Exam:  Vitals:    09/19/23 1113   BP: (!) 144/69   Pulse: 72   Weight: 69.9 kg (154 lb 1.6 oz)   Height: 5' 6" (1.676 m)   PainSc:   2   PainLoc: Shoulder     Body mass index is 24.87 kg/m².    Gen: NAD  Psych: mood appropriate for given condition  HEENT: eyes anicteric   CV: RRR, 2+ radial pulse  HEENT: anicteric   Respiratory: non-labored, no signs of respiratory distress  Abd: non-distended  Skin: warm, dry and intact.  Gait: Able to heel walk, toe walk. No antalgic gait.     Coordination:   Tandem walking coordination: normal    Cervical spine: ROM is full in flexion, extension and lateral rotation without increased pain.  Spurling's maneuver causes no neck pain to either side.  Myofascial exam: No Tenderness to palpation across cervical paraspinous region bilaterally.    Lumbar spine:  Lumbar spine: ROM is full with flexion extension and oblique extension with no increased pain.    Charles's test causes no increased pain on either side.    Supine " straight leg raise is negative bilaterally.    Internal and external rotation of the hip causes no increased pain on either side.  Myofascial exam: No tenderness to palpation across lumbar paraspinous muscles. No tenderness to palpation over the bilateral greater trochanters and bilateral SI joint    Sensory:  Intact and symmetrical to light touch in C4-T1 dermatomes bilaterally. Intact and symmetrical to light touch in L1-S1 dermatomes bilaterally.    Motor:    Right Left   C4 Shoulder Abduction  5  5   C5 Elbow Flexion    5  5   C6 Wrist Extension  5  5   C7 Elbow Extension   5  5   C8/T1 Hand Intrinsics   5  5        Right Left   L2/3 Iliacus Hip flexion  5  5   L3/4 Qudratus Femoris Knee Extension  5  5   L4/5 Tib Anterior Ankle Dorsiflexion   5  5   L5/S1 Extensor Hallicus Longus Great toe extension  5  5   S1/S2 Gastroc/Soleus Plantar Flexion  5  5      Right Left   Triceps DTR 2+ 2+   Biceps DTR 2+ 2+   Brachioradialis DTR 2+ 2+   Patellar DTR 2+ 2+   Achilles DTR 2+ 2+   Mendoza Absent  Absent   Clonus Absent Absent   Babinski Absent Absent       Imaging:  No spine imaging.       Assessment:   Castillo Anderson Jr. is a 77 y.o. year old male patient who has a past medical history of Cataract, Claudication of lower extremity, Colon polyp, COPD (chronic obstructive pulmonary disease), mumps, Hyperlipidemia, Kidney stone, Left carotid stenosis, Lumbar disc disorder, PVD (peripheral vascular disease), Sleep apnea, and Tobacco dependence. He presents in rself referral for left shoulder and arm pain - most likely cervical radiculopathy C6 distribution.  No focal neurological deficits.    Problem List Items Addressed This Visit    None  Visit Diagnoses       Cervical radiculopathy    -  Primary    Relevant Medications    methylPREDNISolone (MEDROL, LENORE,) 4 mg tablet            Plan:  - will try medrol taper to help with acute cervical radiculoapthy.  - I have given him instructions on home exercise.  - if  noimprovement then consider PT and/ or MRI to determine if he is a candidate for YING.      Follow Up: RTC as needed if no improvement      : Reviewed and consistent with medication use as prescribed.          Niyah Tellez PA-C  [facility]

## 2023-10-02 ENCOUNTER — TELEPHONE (OUTPATIENT)
Dept: PAIN MEDICINE | Facility: CLINIC | Age: 78
End: 2023-10-02
Payer: MEDICARE

## 2023-10-02 DIAGNOSIS — M54.12 CERVICAL RADICULOPATHY: Primary | ICD-10-CM

## 2023-10-02 NOTE — TELEPHONE ENCOUNTER
I spoke with Mr. Anderson and he has been scheduled for an MRI 10-14-23 and a follow up for results with Niyah Tellez 10-16-23, he indicated understanding.

## 2023-10-02 NOTE — TELEPHONE ENCOUNTER
----- Message from Jacque Cleveland sent at 9/29/2023  1:33 PM CDT -----  Contact: self  Type:  Needs Medical Advice    Who Called: self  Would the patient rather a call back or a response via [facility]? call  Best Call Back Number: [phone] (home)     Additional Information: pt is still having issue with left arm. Pt wants to speak with nurse asap.Please advise and thank you.

## 2023-10-02 NOTE — TELEPHONE ENCOUNTER
Lakeisha spoke with Mr. Anderson and he has finished the steroid pack and it did not help the pain in his left arm. He would like to go ahead with the MRI, so he will need that order placed. Please advise.

## 2023-10-02 NOTE — TELEPHONE ENCOUNTER
I called for Mr. Anderson to schedule him for an MRI, got voicemail, call back number was provided.

## 2023-10-13 ENCOUNTER — TELEPHONE (OUTPATIENT)
Dept: PAIN MEDICINE | Facility: CLINIC | Age: 78
End: 2023-10-13
Payer: MEDICARE

## 2023-10-13 NOTE — TELEPHONE ENCOUNTER
I spoke with Mr. Anderson and he is asking Niyah to do an MRI of his complete spine and not just the neck. I spoke with Niyah and then notified Mr. Anderson that because they did not discuss the entire spine in his clinic visit she can't order an MRI of the complete spine, he indicated understanding.

## 2023-10-13 NOTE — TELEPHONE ENCOUNTER
----- Message from Berta Soto sent at 10/13/2023 11:39 AM CDT -----  Contact: Patient  Type:  Needs Medical Advice    Who Called:   Patient    Would the patient rather a call back or a response via [facility]?   Call back  Best Call Back Number:   [phone]    Additional Information:   States he needs to speak with someone in the office - states he has questions about his MRI tomorrow - please call - thank you

## 2023-10-14 ENCOUNTER — HOSPITAL ENCOUNTER (OUTPATIENT)
Dept: RADIOLOGY | Facility: HOSPITAL | Age: 78
Discharge: HOME | End: 2023-10-14
Attending: PHYSICIAN ASSISTANT
Payer: MEDICARE

## 2023-10-14 DIAGNOSIS — M54.12 CERVICAL RADICULOPATHY: ICD-10-CM

## 2023-10-14 PROCEDURE — 72141 MRI NECK SPINE W/O DYE: CPT | Mod: TC,HCNC,PO

## 2023-10-14 PROCEDURE — 72141 MRI NECK SPINE W/O DYE: CPT | Mod: 26,HCNC,, | Performed by: RADIOLOGY

## 2023-10-14 PROCEDURE — 72141 MRI CERVICAL SPINE WITHOUT CONTRAST: ICD-10-PCS | Mod: 26,HCNC,, | Performed by: RADIOLOGY

## 2023-10-16 ENCOUNTER — OFFICE VISIT (OUTPATIENT)
Dept: CARDIOLOGY | Facility: CLINIC | Age: 78
End: 2023-10-16
Payer: MEDICARE

## 2023-10-16 VITALS
HEART RATE: 82 BPM | HEIGHT: 66 IN | WEIGHT: 155.19 LBS | SYSTOLIC BLOOD PRESSURE: 134 MMHG | DIASTOLIC BLOOD PRESSURE: 78 MMHG | BODY MASS INDEX: 24.94 KG/M2

## 2023-10-16 DIAGNOSIS — I70.212 ATHEROSCLEROSIS OF NATIVE ARTERY OF LEFT LOWER EXTREMITY WITH INTERMITTENT CLAUDICATION: ICD-10-CM

## 2023-10-16 DIAGNOSIS — I67.81 ACUTE CEREBROVASCULAR INSUFFICIENCY: ICD-10-CM

## 2023-10-16 DIAGNOSIS — F17.200 TOBACCO DEPENDENCE: ICD-10-CM

## 2023-10-16 DIAGNOSIS — E78.5 HYPERLIPIDEMIA, UNSPECIFIED HYPERLIPIDEMIA TYPE: ICD-10-CM

## 2023-10-16 DIAGNOSIS — Z86.010 HX OF COLONIC POLYPS: ICD-10-CM

## 2023-10-16 DIAGNOSIS — E78.2 MIXED HYPERLIPIDEMIA: Primary | ICD-10-CM

## 2023-10-16 DIAGNOSIS — G47.33 OBSTRUCTIVE SLEEP APNEA ON CPAP: ICD-10-CM

## 2023-10-16 DIAGNOSIS — I73.9 PVD (PERIPHERAL VASCULAR DISEASE): ICD-10-CM

## 2023-10-16 DIAGNOSIS — R93.89 ABNORMAL CT OF THE CHEST: ICD-10-CM

## 2023-10-16 DIAGNOSIS — Z98.890 S/P CAROTID ENDARTERECTOMY: ICD-10-CM

## 2023-10-16 PROCEDURE — 93005 ELECTROCARDIOGRAM TRACING: CPT | Mod: HCNC,PO

## 2023-10-16 PROCEDURE — 99204 OFFICE O/P NEW MOD 45 MIN: CPT | Mod: HCNC,S$GLB,, | Performed by: INTERNAL MEDICINE

## 2023-10-16 PROCEDURE — 99204 PR OFFICE/OUTPT VISIT, NEW, LEVL IV, 45-59 MIN: ICD-10-PCS | Mod: HCNC,S$GLB,, | Performed by: INTERNAL MEDICINE

## 2023-10-16 PROCEDURE — 1159F MED LIST DOCD IN RCRD: CPT | Mod: HCNC,CPTII,S$GLB, | Performed by: INTERNAL MEDICINE

## 2023-10-16 PROCEDURE — 1101F PR PT FALLS ASSESS DOC 0-1 FALLS W/OUT INJ PAST YR: ICD-10-PCS | Mod: HCNC,CPTII,S$GLB, | Performed by: INTERNAL MEDICINE

## 2023-10-16 PROCEDURE — 3078F PR MOST RECENT DIASTOLIC BLOOD PRESSURE < 80 MM HG: ICD-10-PCS | Mod: HCNC,CPTII,S$GLB, | Performed by: INTERNAL MEDICINE

## 2023-10-16 PROCEDURE — 1159F PR MEDICATION LIST DOCUMENTED IN MEDICAL RECORD: ICD-10-PCS | Mod: HCNC,CPTII,S$GLB, | Performed by: INTERNAL MEDICINE

## 2023-10-16 PROCEDURE — 93010 ELECTROCARDIOGRAM REPORT: CPT | Mod: S$GLB,,, | Performed by: INTERNAL MEDICINE

## 2023-10-16 PROCEDURE — 1101F PT FALLS ASSESS-DOCD LE1/YR: CPT | Mod: HCNC,CPTII,S$GLB, | Performed by: INTERNAL MEDICINE

## 2023-10-16 PROCEDURE — 3078F DIAST BP <80 MM HG: CPT | Mod: HCNC,CPTII,S$GLB, | Performed by: INTERNAL MEDICINE

## 2023-10-16 PROCEDURE — 1160F RVW MEDS BY RX/DR IN RCRD: CPT | Mod: HCNC,CPTII,S$GLB, | Performed by: INTERNAL MEDICINE

## 2023-10-16 PROCEDURE — 93010 EKG 12-LEAD: ICD-10-PCS | Mod: S$GLB,,, | Performed by: INTERNAL MEDICINE

## 2023-10-16 PROCEDURE — 1126F AMNT PAIN NOTED NONE PRSNT: CPT | Mod: HCNC,CPTII,S$GLB, | Performed by: INTERNAL MEDICINE

## 2023-10-16 PROCEDURE — 99999 PR PBB SHADOW E&M-EST. PATIENT-LVL IV: CPT | Mod: PBBFAC,HCNC,, | Performed by: INTERNAL MEDICINE

## 2023-10-16 PROCEDURE — 99999 PR PBB SHADOW E&M-EST. PATIENT-LVL IV: ICD-10-PCS | Mod: PBBFAC,HCNC,, | Performed by: INTERNAL MEDICINE

## 2023-10-16 PROCEDURE — 1126F PR PAIN SEVERITY QUANTIFIED, NO PAIN PRESENT: ICD-10-PCS | Mod: HCNC,CPTII,S$GLB, | Performed by: INTERNAL MEDICINE

## 2023-10-16 PROCEDURE — 3075F SYST BP GE 130 - 139MM HG: CPT | Mod: HCNC,CPTII,S$GLB, | Performed by: INTERNAL MEDICINE

## 2023-10-16 PROCEDURE — 3288F PR FALLS RISK ASSESSMENT DOCUMENTED: ICD-10-PCS | Mod: HCNC,CPTII,S$GLB, | Performed by: INTERNAL MEDICINE

## 2023-10-16 PROCEDURE — 3075F PR MOST RECENT SYSTOLIC BLOOD PRESS GE 130-139MM HG: ICD-10-PCS | Mod: HCNC,CPTII,S$GLB, | Performed by: INTERNAL MEDICINE

## 2023-10-16 PROCEDURE — 1160F PR REVIEW ALL MEDS BY PRESCRIBER/CLIN PHARMACIST DOCUMENTED: ICD-10-PCS | Mod: HCNC,CPTII,S$GLB, | Performed by: INTERNAL MEDICINE

## 2023-10-16 PROCEDURE — 3288F FALL RISK ASSESSMENT DOCD: CPT | Mod: HCNC,CPTII,S$GLB, | Performed by: INTERNAL MEDICINE

## 2023-10-16 NOTE — PROGRESS NOTES
Subjective:    Patient ID:  Castillo Anderson Jr. is a 77 y.o. male patient here for evaluation Hyperlipidemia      History of Present Illness:  New patient cardiology follow-up, reestablished.  History of peripheral arterial disease status post remote left CEA, status post remote lower extremity PTA stent procedures.  Stable claudication.  Risk factors include ongoing tobacco use, dyslipidemia family history.  Stable DIEGO, cough.  No angina.  No arrhythmia.    Concomitant problems COPD, ongoing tobacco use.  History of RODNEY CPAP.    No CVA Ca.    Last noninvasive cardiac assessment number of years ago.             Review of patient's allergies indicates:   Allergen Reactions    No known drug allergies        Past Medical History:   Diagnosis Date    Cataract     OU    Claudication of lower extremity     Colon polyp     COPD (chronic obstructive pulmonary disease)     Hx of mumps     Hyperlipidemia     Kidney stone     Left carotid stenosis     procedure done to fix    Lumbar disc disorder     PVD (peripheral vascular disease)     Sleep apnea     uses cpap    Tobacco dependence      Past Surgical History:   Procedure Laterality Date    CAROTID ENDARTERECTOMY Left 2015    after 6/2015    CAROTID ENDARTERECTOMY Left 06/2016    CATARACT EXTRACTION Bilateral     COLONOSCOPY  03/05/2013    Dr. Morton, repeat in 5 years for surveillance    COLONOSCOPY  2007    in [facility]    COLONOSCOPY N/A 5/15/2018    Procedure: COLONOSCOPY;  Surgeon: Quentin Morton MD;  Location: 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 ENDO;  Service: Endoscopy;  Laterality: N/A;    COMPUTED TOMOGRAPHY ANGIOGRAM Left 05/26/2016    Leg     EPIDURAL BLOCK INJECTION      EYE SURGERY      FEMORAL ARTERY STENT  2010    FEMORAL ARTERY STENT Left 05/26/2016    LAMINECTOMY  2012    left sup fem and prox popliteal stents  9/30/15    x 2    TONSILLECTOMY      UVULECTOMY       Social History     Tobacco Use    Smoking status: Every Day     Current packs/day: 1.00     Average packs/day: 1 pack/day for 55.8 years (55.8 ttl pk-yrs)     Types: Cigarettes     Start date: 1968    Smokeless tobacco: Never    Tobacco comments:     vapeing   Substance Use Topics    Alcohol use: Yes     Alcohol/week: 0.0 - 2.0 standard drinks of alcohol     Comment: per week    Drug use: No        Review of Systems:    As noted in HPI in addition         REVIEW OF SYSTEMS  Review of Systems   Constitutional: Negative for decreased appetite, diaphoresis, night sweats, weight gain and weight loss.   HENT:  Negative for nosebleeds and odynophagia.    Eyes:  Negative for double vision and photophobia.   Cardiovascular:  Positive for chest pain and dyspnea on exertion. Negative for claudication, cyanosis, irregular heartbeat, leg swelling, near-syncope, orthopnea, palpitations, paroxysmal nocturnal dyspnea and syncope.   Respiratory:  Positive for cough and shortness of breath. Negative for hemoptysis and wheezing.    Hematologic/Lymphatic: Negative for adenopathy.   Skin:  Negative for flushing, skin cancer and suspicious lesions.   Musculoskeletal:  Negative for gout, myalgias and neck pain.   Gastrointestinal:  Negative for abdominal pain, heartburn, hematemesis and hematochezia.   Genitourinary:  Negative for bladder incontinence, hesitancy and nocturia.   Neurological:  Negative for focal weakness, headaches, light-headedness and paresthesias.   Psychiatric/Behavioral:  Negative for memory loss and substance abuse.        Objective:        Vitals:    10/16/23 0901   BP: 134/78   Pulse: 82       Lab Results   Component Value Date    WBC 7.12 09/06/2023    HGB 16.9 09/06/2023    HCT 51.1  09/06/2023     09/06/2023    CHOL 133 09/06/2023    TRIG 68 09/06/2023    HDL 62 09/06/2023    ALT 32 09/06/2023    AST 29 09/06/2023     09/06/2023    K 3.9 09/06/2023     09/06/2023    CREATININE 1.0 09/06/2023    BUN 21 09/06/2023    CO2 27 09/06/2023    TSH 1.203 09/06/2023    PSA 5.6 (H) 01/13/2022    INR 0.9 05/26/2016      CARDIOGRAM RESULTS  Results for orders placed in visit on 09/18/19    Echo    Interpretation Summary  · Normal left ventricular systolic function. The estimated ejection fraction is 65%  · Normal LV diastolic function.  · Concentric left ventricular remodeling.  · Normal right ventricular systolic function.  · Normal central venous pressure (3 mm Hg).  · The estimated PA systolic pressure is 25 mm Hg        CURRENT/PREVIOUS VISIT EKG  Results for orders placed or performed in visit on 05/02/23   IN OFFICE EKG 12-LEAD (to [place])    Collection Time: 05/02/23  9:17 AM    Narrative    Test Reason : R07.9,    Vent. Rate : 064 BPM     Atrial Rate : 064 BPM     P-R Int : 142 ms          QRS Dur : 084 ms      QT Int : 410 ms       P-R-T Axes : 128 114 128 degrees     QTc Int : 422 ms    Limb lead reversal  Sinus rhythm  Otherwise normal ECG  Compared to previous tracing  No significant change was found  repeat EKG  Reconfirmed by MINOR AMAYA MD ([id number]) on 5/2/2023 12:30:13 PM    Referred By: MARCELLE BUENO           Confirmed By:MINOR AMAYA MD     No valid procedures specified.   No results found for this or any previous visit.    No valid procedures specified.    PHYSICAL EXAM  CONSTITUTIONAL: Well built, well nourished in no apparent distress  NECK: no carotid bruit, no JVD  LUNGS: CTA  CHEST WALL: no tenderness,  HEART: regular rate and rhythm, S1, S2 normal, no murmur, click, rub or gallop   ABDOMEN: soft, non-tender; bowel sounds normal; no masses,  no organomegaly  EXTREMITIES: Extremities normal, no edema, no calf tenderness noted  VASCULAR EXAM: 2 PLUS UPPER AND LOWER  Doppler lower EXT PULSES  NEURO: AAO X 3, NO ACUTE FOCAL OR LATERALIZING FINDINGS    I HAVE REVIEWED :    The vital signs, nurses notes, and all the pertinent radiology and labs.         Current Outpatient Medications   Medication Instructions    clopidogreL (PLAVIX) 75 mg tablet TAKE 1 TABLET EVERY DAY    DULoxetine (CYMBALTA) 60 MG capsule Take 1 capsule every day by oral route.    EScitalopram oxalate (LEXAPRO) 10 MG tablet Take 1 tablet every day by oral route for 90 days.    ezetimibe (ZETIA) 10 mg tablet TAKE 1 TABLET EVERY DAY    fluticasone-umeclidin-vilanter (TRELEGY ELLIPTA) 100-62.5-25 mcg DsDv 1 puff, Inhalation, Daily    multivit with minerals/lutein (MULTIVITAMIN 50 PLUS ORAL) multivitamin    nitroGLYCERIN (NITROSTAT) 0.4 MG SL tablet nitroglycerin 0.4 mg sublingual tablet    rosuvastatin (CRESTOR) 20 MG tablet TAKE 1 TABLET EVERY DAY    sertraline (ZOLOFT) 50 MG tablet No dose, route, or frequency recorded.    traZODone (DESYREL) 50 mg, Oral, Nightly, Every day          Assessment:   Peripheral arterial disease.  Status post remote left CEA.  Status post lower extremity percutaneous revascularization via PTA, stable claudication.    Dyslipidemia, tobacco use, family history.    COPD.  RODNEY CPAP.        Plan:   Justine, echo, carotid ultrasound.  Lower extremity arterial ultrasound.          No follow-ups on file.

## 2023-10-17 ENCOUNTER — OFFICE VISIT (OUTPATIENT)
Dept: PAIN MEDICINE | Facility: CLINIC | Age: 78
End: 2023-10-17
Payer: MEDICARE

## 2023-10-17 ENCOUNTER — TELEPHONE (OUTPATIENT)
Dept: PAIN MEDICINE | Facility: CLINIC | Age: 78
End: 2023-10-17

## 2023-10-17 VITALS
HEIGHT: 66 IN | BODY MASS INDEX: 24.94 KG/M2 | HEART RATE: 83 BPM | WEIGHT: 155.19 LBS | SYSTOLIC BLOOD PRESSURE: 137 MMHG | DIASTOLIC BLOOD PRESSURE: 81 MMHG

## 2023-10-17 DIAGNOSIS — M47.812 CERVICAL SPONDYLOSIS: ICD-10-CM

## 2023-10-17 DIAGNOSIS — M54.12 CERVICAL RADICULOPATHY: Primary | ICD-10-CM

## 2023-10-17 DIAGNOSIS — M50.20 CERVICAL DISC HERNIATION: ICD-10-CM

## 2023-10-17 PROCEDURE — 1160F RVW MEDS BY RX/DR IN RCRD: CPT | Mod: HCNC,CPTII,S$GLB, | Performed by: PHYSICIAN ASSISTANT

## 2023-10-17 PROCEDURE — 3288F PR FALLS RISK ASSESSMENT DOCUMENTED: ICD-10-PCS | Mod: HCNC,CPTII,S$GLB, | Performed by: PHYSICIAN ASSISTANT

## 2023-10-17 PROCEDURE — 3075F PR MOST RECENT SYSTOLIC BLOOD PRESS GE 130-139MM HG: ICD-10-PCS | Mod: HCNC,CPTII,S$GLB, | Performed by: PHYSICIAN ASSISTANT

## 2023-10-17 PROCEDURE — 3079F PR MOST RECENT DIASTOLIC BLOOD PRESSURE 80-89 MM HG: ICD-10-PCS | Mod: HCNC,CPTII,S$GLB, | Performed by: PHYSICIAN ASSISTANT

## 2023-10-17 PROCEDURE — 1160F PR REVIEW ALL MEDS BY PRESCRIBER/CLIN PHARMACIST DOCUMENTED: ICD-10-PCS | Mod: HCNC,CPTII,S$GLB, | Performed by: PHYSICIAN ASSISTANT

## 2023-10-17 PROCEDURE — 99214 OFFICE O/P EST MOD 30 MIN: CPT | Mod: HCNC,S$GLB,, | Performed by: PHYSICIAN ASSISTANT

## 2023-10-17 PROCEDURE — 99999 PR PBB SHADOW E&M-EST. PATIENT-LVL IV: CPT | Mod: PBBFAC,HCNC,, | Performed by: PHYSICIAN ASSISTANT

## 2023-10-17 PROCEDURE — 1159F PR MEDICATION LIST DOCUMENTED IN MEDICAL RECORD: ICD-10-PCS | Mod: HCNC,CPTII,S$GLB, | Performed by: PHYSICIAN ASSISTANT

## 2023-10-17 PROCEDURE — 3288F FALL RISK ASSESSMENT DOCD: CPT | Mod: HCNC,CPTII,S$GLB, | Performed by: PHYSICIAN ASSISTANT

## 2023-10-17 PROCEDURE — 99214 PR OFFICE/OUTPT VISIT, EST, LEVL IV, 30-39 MIN: ICD-10-PCS | Mod: HCNC,S$GLB,, | Performed by: PHYSICIAN ASSISTANT

## 2023-10-17 PROCEDURE — 99999 PR PBB SHADOW E&M-EST. PATIENT-LVL IV: ICD-10-PCS | Mod: PBBFAC,HCNC,, | Performed by: PHYSICIAN ASSISTANT

## 2023-10-17 PROCEDURE — 1125F AMNT PAIN NOTED PAIN PRSNT: CPT | Mod: HCNC,CPTII,S$GLB, | Performed by: PHYSICIAN ASSISTANT

## 2023-10-17 PROCEDURE — 1125F PR PAIN SEVERITY QUANTIFIED, PAIN PRESENT: ICD-10-PCS | Mod: HCNC,CPTII,S$GLB, | Performed by: PHYSICIAN ASSISTANT

## 2023-10-17 PROCEDURE — 1101F PT FALLS ASSESS-DOCD LE1/YR: CPT | Mod: HCNC,CPTII,S$GLB, | Performed by: PHYSICIAN ASSISTANT

## 2023-10-17 PROCEDURE — 1159F MED LIST DOCD IN RCRD: CPT | Mod: HCNC,CPTII,S$GLB, | Performed by: PHYSICIAN ASSISTANT

## 2023-10-17 PROCEDURE — 1101F PR PT FALLS ASSESS DOC 0-1 FALLS W/OUT INJ PAST YR: ICD-10-PCS | Mod: HCNC,CPTII,S$GLB, | Performed by: PHYSICIAN ASSISTANT

## 2023-10-17 PROCEDURE — 3075F SYST BP GE 130 - 139MM HG: CPT | Mod: HCNC,CPTII,S$GLB, | Performed by: PHYSICIAN ASSISTANT

## 2023-10-17 PROCEDURE — 3079F DIAST BP 80-89 MM HG: CPT | Mod: HCNC,CPTII,S$GLB, | Performed by: PHYSICIAN ASSISTANT

## 2023-10-17 NOTE — PROGRESS NOTES
[facility]        PCP:  Frantz Lemos MD    CC:   Chief Complaint   Patient presents with    Follow-up     MRI results for neck pain.          HPI:     Mr. AREVALO returns for follow up of neck, left arm pain/ numbness/ tignling after undergoing imaging.  He continues to have pain in the elft neck, left clavicle to the left arm and hand.  It is associated with numbness/ tingling in the left hand.  Yesterday he had right arm numbness, but this has not recurred.     Initial HPI:  Castillo Anderson Jr. is a 77 y.o. year old male patient who has a past medical history of Cataract, Claudication of lower extremity, Colon polyp, COPD (chronic obstructive pulmonary disease), mumps, Hyperlipidemia, Kidney stone, Left carotid stenosis, Lumbar disc disorder, PVD (peripheral vascular disease), Sleep apnea, and Tobacco dependence. He presents in self referral for left shoulder an arm pain, numbness.  He is left handed.   For a few months has had felt pin in the left clavicle and shoulder region that is intermittent, varying in intensity.  With time he has also felt numbness, eadening senstaion in the hand, and all fingers.      Denies bowel/ bladder incontinence.    He has had cardiology work up and orthopedic worke up.  No cardiology cause found.  No benefit with recent right shoulder injection.       Past and current medications:  Antineuropathics:  NSAIDs:  aleve  Antidepressants:  lexapro  Muscle relaxers:  Opioids:  Antiplatelets/Anticoagulants:  plavix  Others:  tylenol.  (Completed medrol taper)    Physical Therapy/ Chiropractic care:  None      Pain Intervention History:  none    Past Spine Surgical History:  Lumbar surger 2012        History:    Current Outpatient Medications:     clopidogreL (PLAVIX) 75 mg tablet, TAKE 1 TABLET EVERY DAY, Disp: 90 tablet, Rfl: 3    DULoxetine (CYMBALTA) 60 MG capsule, Take 1 capsule every day by oral route., Disp: , Rfl:     EScitalopram oxalate (LEXAPRO) 10 MG tablet,  Take 1 tablet every day by oral route for 90 days., Disp: , Rfl:     ezetimibe (ZETIA) 10 mg tablet, TAKE 1 TABLET EVERY DAY, Disp: 90 tablet, Rfl: 3    fluticasone-umeclidin-vilanter (TRELEGY ELLIPTA) 100-62.5-25 mcg DsDv, Inhale 1 puff into the lungs once daily., Disp: 90 each, Rfl: 3    multivit with minerals/lutein (MULTIVITAMIN 50 PLUS ORAL), multivitamin, Disp: , Rfl:     nitroGLYCERIN (NITROSTAT) 0.4 MG SL tablet, nitroglycerin 0.4 mg sublingual tablet, Disp: , Rfl:     rosuvastatin (CRESTOR) 20 MG tablet, TAKE 1 TABLET EVERY DAY, Disp: 90 tablet, Rfl: 3    sertraline (ZOLOFT) 50 MG tablet, , Disp: , Rfl:     trazodone (DESYREL) 50 MG tablet, Take 50 mg by mouth every evening. Every day, Disp: , Rfl:     Past Medical History:   Diagnosis Date    Cataract     OU    Claudication of lower extremity     Colon polyp     COPD (chronic obstructive pulmonary disease)     Hx of mumps     Hyperlipidemia     Kidney stone     Left carotid stenosis     procedure done to fix    Lumbar disc disorder     PVD (peripheral vascular disease)     Sleep apnea     uses cpap    Tobacco dependence        Past Surgical History:   Procedure Laterality Date    CAROTID ENDARTERECTOMY Left 2015    after 6/2015    CAROTID ENDARTERECTOMY Left 06/2016    CATARACT EXTRACTION Bilateral     COLONOSCOPY  03/05/2013    Dr. 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    COLONOSCOPY N/A 2/19/2021    Procedure: COLONOSCOPY;  Surgeon: Rishi Nino MD;  Location: [facility];  Service: Endoscopy;  Laterality: N/A;    COMPUTED TOMOGRAPHY ANGIOGRAM Left 05/26/2016     Leg    EPIDURAL BLOCK INJECTION      EYE SURGERY      FEMORAL ARTERY STENT  2010    FEMORAL ARTERY STENT Left 05/26/2016    LAMINECTOMY  2012    left sup fem and prox popliteal stents  9/30/15    x 2    TONSILLECTOMY      UVULECTOMY         Family History   Problem Relation Age of Onset    Cataracts Mother     Alzheimer's disease Mother     Cancer Father         prostate    Cataracts Sister     Amblyopia Neg Hx     Blindness Neg Hx     Diabetes Neg Hx     Glaucoma Neg Hx     Hypertension Neg Hx     Macular degeneration Neg Hx     Retinal detachment Neg Hx     Strabismus Neg Hx     Stroke Neg Hx     Thyroid disease Neg Hx     Colon cancer Neg Hx     Colon polyps Neg Hx     Crohn's disease Neg Hx     Celiac disease Neg Hx     Ulcerative colitis Neg Hx        Social History     Socioeconomic History    Marital status:    Tobacco Use    Smoking status: Every Day     Current packs/day: 1.00     Average packs/day: 1 pack/day for 55.8 years (55.8 ttl pk-yrs)     Types: Cigarettes     Start date: 1968    Smokeless tobacco: Never    Tobacco comments:     vapeing   Substance and Sexual Activity    Alcohol use: Yes     Alcohol/week: 0.0 - 2.0 standard drinks of alcohol     Comment: per week    Drug use: No     Social Determinants of Health     Financial Resource Strain: Low Risk  (5/2/2023)    Overall Financial Resource Strain (CARDIA)     Difficulty of Paying Living Expenses: Not hard at all   Food Insecurity: No Food Insecurity (5/2/2023)    Hunger Vital Sign     Worried About Running Out of Food in the Last Year: Never true     Ran Out of Food in the Last Year: Never true   Transportation Needs: No Transportation Needs (5/2/2023)    PRAPARE - Transportation     Lack of Transportation (Medical): No     Lack of Transportation (Non-Medical): No   Physical Activity: Unknown (5/2/2023)    Exercise Vital Sign     Days of Exercise per Week: 5 days   Stress: Stress Concern Present (5/2/2023)    [place] of  "Occupational Health - Occupational Stress Questionnaire     Feeling of Stress : To some extent   Social Connections: Moderately Integrated (5/2/2023)    Social Connection and Isolation Panel [NHANES]     Frequency of Communication with Friends and Family: More than three times a week     Frequency of Social Gatherings with Friends and Family: More than three times a week     Attends Quaker Services: More than 4 times per year     Active Member of Clubs or Organizations: No     Attends Club or Organization Meetings: Never     Marital Status:    Housing Stability: Low Risk  (5/2/2023)    Housing Stability Vital Sign     Unable to Pay for Housing in the Last Year: No     Number of Places Lived in the Last Year: 1     Unstable Housing in the Last Year: No       Review of patient's allergies indicates:   Allergen Reactions    No known drug allergies        Labs:  No results found for: "LABA1C", "HGBA1C"    Lab Results   Component Value Date    WBC 7.12 09/06/2023    HGB 16.9 09/06/2023    HCT 51.1 09/06/2023    MCV 87 09/06/2023     09/06/2023           Review of Systems:  Left neck, shoulder pain.  Left arm apin. .  Balance of review of systems is negative.    Physical Exam:  Vitals:    10/17/23 1053   BP: 137/81   Pulse: 83   Weight: 70.4 kg (155 lb 3.3 oz)   Height: 5' 6" (1.676 m)   PainSc:   4   PainLoc: Neck     Body mass index is 25.05 kg/m².    Gen: NAD  Psych: mood appropriate for given condition  HEENT: eyes anicteric   CV: RRR, 2+ radial pulse  HEENT: anicteric   Respiratory: non-labored, no signs of respiratory distress  Abd: non-distended  Skin: warm, dry and intact.  Gait: Able to heel walk, toe walk. No antalgic gait.     Coordination:   Tandem walking coordination: normal    Cervical spine: ROM is full in flexion, extension and lateral rotation without increased pain.  Spurling's maneuver causes no neck pain to either side.  Myofascial exam: No Tenderness to palpation across cervical " paraspinous region bilaterally.    Lumbar spine:  Lumbar spine: ROM is full with flexion extension and oblique extension with no increased pain.    Charles's test causes no increased pain on either side.    Supine straight leg raise is negative bilaterally.    Internal and external rotation of the hip causes no increased pain on either side.  Myofascial exam: No tenderness to palpation across lumbar paraspinous muscles. No tenderness to palpation over the bilateral greater trochanters and bilateral SI joint    Sensory:  Intact and symmetrical to light touch in C4-T1 dermatomes bilaterally. Intact and symmetrical to light touch in L1-S1 dermatomes bilaterally.    Motor:    Right Left   C4 Shoulder Abduction  5  5   C5 Elbow Flexion    5  5   C6 Wrist Extension  5  5   C7 Elbow Extension   5  5   C8/T1 Hand Intrinsics   5  5        Right Left   L2/3 Iliacus Hip flexion  5  5   L3/4 Qudratus Femoris Knee Extension  5  5   L4/5 Tib Anterior Ankle Dorsiflexion   5  5   L5/S1 Extensor Hallicus Longus Great toe extension  5  5   S1/S2 Gastroc/Soleus Plantar Flexion  5  5      Right Left   Triceps DTR 2+ 2+   Biceps DTR 2+ 2+   Brachioradialis DTR 2+ 2+   Patellar DTR 2+ 2+   Achilles DTR 2+ 2+   Mendoza Absent  Absent   Clonus Absent Absent   Babinski Absent Absent       Imaging:    MRI cervical spinen from 10-14-23 reveiwed:  C3/4 right facet hypertrophy and disk with right foraminal narrowing narrowing.  C4/5 facet hypertrophy with rght foraminal narrowign.  C5/6 facet hypertrohy, broad based disk with severe right greater than left foraminal narrowing, central canal narrowing.  No cord signal changes.C6/7 left disk hernai with severe left foraminal narrowign.       Assessment:   Castillo Anderson Jr. is a 77 y.o. year old male patient who has a past medical history of Cataract, Claudication of lower extremity, Colon polyp, COPD (chronic obstructive pulmonary disease), mumps, Hyperlipidemia, Kidney stone, Left carotid  stenosis, Lumbar disc disorder, PVD (peripheral vascular disease), Sleep apnea, and Tobacco dependence. He presents in for left shoulder and arm pain - most likely cervical radiculopathy left C7 radiculopathy with large left C6/7 disc hernia.  Possible influence for C5/6 as well with bilateral foraminal narrowing.     Problem List Items Addressed This Visit    None  Visit Diagnoses       Cervical radiculopathy    -  Primary    Relevant Orders    Ambulatory referral/consult to Physical/Occupational Therapy    Procedure Order to Pain Management    Cervical spondylosis        Relevant Orders    Ambulatory referral/consult to Physical/Occupational Therapy    Procedure Order to Pain Management    Cervical disc herniation        Relevant Orders    Ambulatory referral/consult to Physical/Occupational Therapy    Procedure Order to Pain Management            Plan:  - discussed medications, PT and C7T1 and referral to neurosrguery as options to help.    - he will try PT and YING  - follow up with pain management after YING.      Follow Up: RTC as needed if no improvement      : Reviewed and consistent with medication use as prescribed.          Niyah Tellez PA-C  [facility]

## 2023-10-17 NOTE — TELEPHONE ENCOUNTER
Physician - Dr Helton    Type of Procedure/Injection - Cervical Epidural  C7/T1           Laterality - NA      Type of Sedation - RNIV      Need to hold medication - Yes      Plavix for 7 days          Clearance needed - Yes      Follow up - 3 week

## 2023-10-18 DIAGNOSIS — M54.12 CERVICAL RADICULOPATHY: Primary | ICD-10-CM

## 2023-10-18 RX ORDER — CLOPIDOGREL BISULFATE 75 MG/1
75 TABLET ORAL DAILY
Qty: 90 TABLET | Refills: 3 | Status: SHIPPED | OUTPATIENT
Start: 2023-10-18

## 2023-10-18 RX ORDER — SODIUM CHLORIDE, SODIUM LACTATE, POTASSIUM CHLORIDE, CALCIUM CHLORIDE 600; 310; 30; 20 MG/100ML; MG/100ML; MG/100ML; MG/100ML
INJECTION, SOLUTION INTRAVENOUS CONTINUOUS
Status: CANCELLED | OUTPATIENT
Start: 2023-10-18

## 2023-10-18 NOTE — TELEPHONE ENCOUNTER
Spoke with patient. Cervical YING scheduled. Advised patient to hold Plavix x 7 days. E-Consult sent to Cardiology for clearance. Pre op information given over the phone to patient and 3 week follow up appointment made.

## 2023-10-19 ENCOUNTER — PATIENT MESSAGE (OUTPATIENT)
Dept: RADIOLOGY | Facility: HOSPITAL | Age: 78
End: 2023-10-19
Payer: MEDICARE

## 2023-10-23 ENCOUNTER — CLINICAL SUPPORT (OUTPATIENT)
Dept: CARDIOLOGY | Facility: HOSPITAL | Age: 78
End: 2023-10-23
Attending: INTERNAL MEDICINE
Payer: MEDICARE

## 2023-10-23 ENCOUNTER — HOSPITAL ENCOUNTER (OUTPATIENT)
Dept: RADIOLOGY | Facility: HOSPITAL | Age: 78
Discharge: HOME | End: 2023-10-23
Attending: INTERNAL MEDICINE
Payer: MEDICARE

## 2023-10-23 VITALS — BODY MASS INDEX: 24.91 KG/M2 | WEIGHT: 155 LBS | HEIGHT: 66 IN

## 2023-10-23 DIAGNOSIS — F17.200 TOBACCO DEPENDENCE: ICD-10-CM

## 2023-10-23 DIAGNOSIS — E78.2 MIXED HYPERLIPIDEMIA: ICD-10-CM

## 2023-10-23 DIAGNOSIS — I73.9 PVD (PERIPHERAL VASCULAR DISEASE): ICD-10-CM

## 2023-10-23 DIAGNOSIS — E78.5 HYPERLIPIDEMIA, UNSPECIFIED HYPERLIPIDEMIA TYPE: ICD-10-CM

## 2023-10-23 DIAGNOSIS — I70.212 ATHEROSCLEROSIS OF NATIVE ARTERY OF LEFT LOWER EXTREMITY WITH INTERMITTENT CLAUDICATION: ICD-10-CM

## 2023-10-23 DIAGNOSIS — Z98.890 S/P CAROTID ENDARTERECTOMY: ICD-10-CM

## 2023-10-23 PROCEDURE — 93018 PR CARDIAC STRESS TST,INTERP/REPT ONLY: ICD-10-PCS | Mod: ,,, | Performed by: INTERNAL MEDICINE

## 2023-10-23 PROCEDURE — 93016 CV STRESS TEST SUPVJ ONLY: CPT | Mod: ,,, | Performed by: INTERNAL MEDICINE

## 2023-10-23 PROCEDURE — 93018 CV STRESS TEST I&R ONLY: CPT | Mod: ,,, | Performed by: INTERNAL MEDICINE

## 2023-10-23 PROCEDURE — 93016 NUCLEAR STRESS - CARDIOLOGY INTERPRETED (CUPID ONLY): ICD-10-PCS | Mod: ,,, | Performed by: INTERNAL MEDICINE

## 2023-10-23 PROCEDURE — 93017 CV STRESS TEST TRACING ONLY: CPT | Mod: PO

## 2023-10-23 PROCEDURE — 63600175 PHARM REV CODE 636 W HCPCS: Mod: PO | Performed by: INTERNAL MEDICINE

## 2023-10-23 PROCEDURE — 78452 HT MUSCLE IMAGE SPECT MULT: CPT | Mod: 26,,, | Performed by: INTERNAL MEDICINE

## 2023-10-23 PROCEDURE — 78452 NUCLEAR STRESS - CARDIOLOGY INTERPRETED (CUPID ONLY): ICD-10-PCS | Mod: 26,,, | Performed by: INTERNAL MEDICINE

## 2023-10-23 PROCEDURE — A9502 TC99M TETROFOSMIN: HCPCS | Mod: PO

## 2023-10-23 PROCEDURE — 78452 HT MUSCLE IMAGE SPECT MULT: CPT | Mod: PO

## 2023-10-23 RX ORDER — REGADENOSON 0.08 MG/ML
0.4 INJECTION, SOLUTION INTRAVENOUS
Status: COMPLETED | OUTPATIENT
Start: 2023-10-23 | End: 2023-10-23

## 2023-10-23 RX ADMIN — REGADENOSON 0.4 MG: 0.08 INJECTION, SOLUTION INTRAVENOUS at 02:10

## 2023-10-24 LAB
CV PHARM DOSE: 0.4 MG
CV STRESS BASE HR: 82 BPM
DIASTOLIC BLOOD PRESSURE: 87 MMHG
NUC STRESS EJECTION FRACTION: 78 %
OHS CV CPX 1 MINUTE RECOVERY HEART RATE: 109 BPM
OHS CV CPX 85 PERCENT MAX PREDICTED HEART RATE MALE: 121
OHS CV CPX MAX PREDICTED HEART RATE: 142
OHS CV CPX PATIENT IS FEMALE: 0
OHS CV CPX PATIENT IS MALE: 1
OHS CV CPX PEAK DIASTOLIC BLOOD PRESSURE: 87 MMHG
OHS CV CPX PEAK HEAR RATE: 111 BPM
OHS CV CPX PEAK RATE PRESSURE PRODUCT: NORMAL
OHS CV CPX PEAK SYSTOLIC BLOOD PRESSURE: 178 MMHG
OHS CV CPX PERCENT MAX PREDICTED HEART RATE ACHIEVED: 78
OHS CV CPX RATE PRESSURE PRODUCT PRESENTING: NORMAL
OHS CV PHARM TIME: 1420 MIN
SYSTOLIC BLOOD PRESSURE: 178 MMHG

## 2023-10-26 ENCOUNTER — TELEPHONE (OUTPATIENT)
Dept: PAIN MEDICINE | Facility: CLINIC | Age: 78
End: 2023-10-26
Payer: MEDICARE

## 2023-10-27 ENCOUNTER — TELEPHONE (OUTPATIENT)
Dept: PAIN MEDICINE | Facility: CLINIC | Age: 78
End: 2023-10-27

## 2023-10-27 ENCOUNTER — CLINICAL SUPPORT (OUTPATIENT)
Dept: CARDIOLOGY | Facility: HOSPITAL | Age: 78
End: 2023-10-27
Attending: INTERNAL MEDICINE
Payer: MEDICARE

## 2023-10-27 VITALS
SYSTOLIC BLOOD PRESSURE: 137 MMHG | WEIGHT: 155 LBS | BODY MASS INDEX: 24.91 KG/M2 | DIASTOLIC BLOOD PRESSURE: 81 MMHG | HEIGHT: 66 IN

## 2023-10-27 DIAGNOSIS — I70.212 ATHEROSCLEROSIS OF NATIVE ARTERY OF LEFT LOWER EXTREMITY WITH INTERMITTENT CLAUDICATION: ICD-10-CM

## 2023-10-27 DIAGNOSIS — Z98.890 S/P CAROTID ENDARTERECTOMY: ICD-10-CM

## 2023-10-27 DIAGNOSIS — I73.9 PVD (PERIPHERAL VASCULAR DISEASE): ICD-10-CM

## 2023-10-27 DIAGNOSIS — E78.2 MIXED HYPERLIPIDEMIA: ICD-10-CM

## 2023-10-27 DIAGNOSIS — I67.81 ACUTE CEREBROVASCULAR INSUFFICIENCY: ICD-10-CM

## 2023-10-27 DIAGNOSIS — E78.5 HYPERLIPIDEMIA, UNSPECIFIED HYPERLIPIDEMIA TYPE: ICD-10-CM

## 2023-10-27 DIAGNOSIS — F17.200 TOBACCO DEPENDENCE: ICD-10-CM

## 2023-10-27 LAB
ASCENDING AORTA: 2.92 CM
AV INDEX (PROSTH): 0.89
AV MEAN GRADIENT: 4 MMHG
AV PEAK GRADIENT: 9 MMHG
AV VALVE AREA BY VELOCITY RATIO: 3.28 CM²
AV VALVE AREA: 3.24 CM²
AV VELOCITY RATIO: 0.9
BSA FOR ECHO PROCEDURE: 1.81 M2
CV ECHO LV RWT: 0.58 CM
DOP CALC AO PEAK VEL: 1.46 M/S
DOP CALC AO VTI: 33.2 CM
DOP CALC LVOT AREA: 3.6 CM2
DOP CALC LVOT DIAMETER: 2.15 CM
DOP CALC LVOT PEAK VEL: 1.32 M/S
DOP CALC LVOT STROKE VOLUME: 107.41 CM3
DOP CALCLVOT PEAK VEL VTI: 29.6 CM
E WAVE DECELERATION TIME: 183.8 MSEC
E/A RATIO: 1.42
E/E' RATIO: 12.14 M/S
ECHO LV POSTERIOR WALL: 1.14 CM (ref 0.6–1.1)
FRACTIONAL SHORTENING: 31 % (ref 28–44)
INTERVENTRICULAR SEPTUM: 1.07 CM (ref 0.6–1.1)
IVRT: 89.44 MSEC
LEFT ATRIUM SIZE: 4.03 CM
LEFT ATRIUM VOLUME INDEX MOD: 28 ML/M2
LEFT ATRIUM VOLUME MOD: 50.05 CM3
LEFT INTERNAL DIMENSION IN SYSTOLE: 2.73 CM (ref 2.1–4)
LEFT VENTRICLE DIASTOLIC VOLUME INDEX: 38.17 ML/M2
LEFT VENTRICLE DIASTOLIC VOLUME: 68.32 ML
LEFT VENTRICLE MASS INDEX: 81 G/M2
LEFT VENTRICLE SYSTOLIC VOLUME INDEX: 15.6 ML/M2
LEFT VENTRICLE SYSTOLIC VOLUME: 27.89 ML
LEFT VENTRICULAR INTERNAL DIMENSION IN DIASTOLE: 3.96 CM (ref 3.5–6)
LEFT VENTRICULAR MASS: 144.36 G
LV LATERAL E/E' RATIO: 12.14 M/S
LV SEPTAL E/E' RATIO: 12.14 M/S
LVOT MG: 3.31 MMHG
LVOT MV: 0.86 CM/S
MV PEAK A VEL: 0.6 M/S
MV PEAK E VEL: 0.85 M/S
PISA TR MAX VEL: 2.18 M/S
PULM VEIN S/D RATIO: 1.28
PV PEAK D VEL: 0.32 M/S
PV PEAK S VEL: 0.41 M/S
RA PRESSURE ESTIMATED: 3 MMHG
RA VOL SYS: 37.56 ML
RIGHT ATRIAL AREA: 14.1 CM2
RIGHT VENTRICULAR END-DIASTOLIC DIMENSION: 3.37 CM
RIGHT VENTRICULAR LENGTH IN DIASTOLE (APICAL 4-CHAMBER VIEW): 7.21 CM
RV MID DIAMA: 2.4 CM
RV TB RVSP: 5 MMHG
RV TISSUE DOPPLER FREE WALL SYSTOLIC VELOCITY 1 (APICAL 4 CHAMBER VIEW): 15.28 CM/S
SINUS: 2.78 CM
STJ: 2.68 CM
TDI LATERAL: 0.07 M/S
TDI SEPTAL: 0.07 M/S
TDI: 0.07 M/S
TR MAX PG: 19 MMHG
TRICUSPID ANNULAR PLANE SYSTOLIC EXCURSION: 2.04 CM
TV REST PULMONARY ARTERY PRESSURE: 22 MMHG
Z-SCORE OF LEFT VENTRICULAR DIMENSION IN END DIASTOLE: -2.23
Z-SCORE OF LEFT VENTRICULAR DIMENSION IN END SYSTOLE: -0.9

## 2023-10-27 PROCEDURE — 93880 CV US DOPPLER CAROTID (CUPID ONLY): ICD-10-PCS | Mod: 26,,, | Performed by: INTERNAL MEDICINE

## 2023-10-27 PROCEDURE — 93880 EXTRACRANIAL BILAT STUDY: CPT | Mod: PO

## 2023-10-27 PROCEDURE — 93925 LOWER EXTREMITY STUDY: CPT | Mod: PO

## 2023-10-27 PROCEDURE — 93880 EXTRACRANIAL BILAT STUDY: CPT | Mod: 26,,, | Performed by: INTERNAL MEDICINE

## 2023-10-27 PROCEDURE — 93306 TTE W/DOPPLER COMPLETE: CPT | Mod: 26,,, | Performed by: INTERNAL MEDICINE

## 2023-10-27 PROCEDURE — 93925 CV US DOPPLER ARTERIAL LEGS BILATERAL (CUPID ONLY): ICD-10-PCS | Mod: 26,,, | Performed by: INTERNAL MEDICINE

## 2023-10-27 PROCEDURE — 93925 LOWER EXTREMITY STUDY: CPT | Mod: 26,,, | Performed by: INTERNAL MEDICINE

## 2023-10-27 PROCEDURE — 93306 ECHO (CUPID ONLY): ICD-10-PCS | Mod: 26,,, | Performed by: INTERNAL MEDICINE

## 2023-10-27 PROCEDURE — 93306 TTE W/DOPPLER COMPLETE: CPT | Mod: PO

## 2023-10-27 NOTE — TELEPHONE ENCOUNTER
----- Message from Stephanie Valdez sent at 10/27/2023 10:28 AM CDT -----  Regarding: Patient Needing to Reschedule Procedure  Patient has a procedure scheduled with Dr. Helton on 11/13/23.  He is needing to reschedule this procedure.  Please contact him to discuss.    Contact #: [phone]    Thank you

## 2023-10-30 LAB
LEFT ANT TIBIAL SYS PSV: 30 CM/S
LEFT ARM DIASTOLIC BLOOD PRESSURE: 81 MMHG
LEFT ARM SYSTOLIC BLOOD PRESSURE: 137 MMHG
LEFT CBA DIAS: 12 CM/S
LEFT CBA SYS: 68 CM/S
LEFT CCA DIST DIAS: 16 CM/S
LEFT CCA DIST SYS: 71 CM/S
LEFT CCA MID DIAS: 14 CM/S
LEFT CCA MID SYS: 79 CM/S
LEFT CCA PROX DIAS: 16 CM/S
LEFT CCA PROX SYS: 110 CM/S
LEFT CFA PSV: 162 CM/S
LEFT ECA DIAS: 18 CM/S
LEFT ECA SYS: 136 CM/S
LEFT ICA DIST DIAS: 18 CM/S
LEFT ICA DIST SYS: 60 CM/S
LEFT ICA MID DIAS: 11 CM/S
LEFT ICA MID SYS: 41 CM/S
LEFT ICA PROX DIAS: 10 CM/S
LEFT ICA PROX SYS: 47 CM/S
LEFT PERONEAL SYS PSV: 29 CM/S
LEFT POPLITEAL PSV: 55 CM/S
LEFT POST TIBIAL SYS PSV: 39 CM/S
LEFT PROFUNDA SYS PSV: 102 CM/S
LEFT SUPER FEMORAL DIST SYS PSV: 89 CM/S
LEFT SUPER FEMORAL MID SYS PSV: 168 CM/S
LEFT SUPER FEMORAL OSTIAL SYS PSV: 204 CM/S
LEFT SUPER FEMORAL PROX SYS PSV: 202 CM/S
LEFT TIB/PER TRUNK SYS PSV: 63 CM/S
LEFT VERTEBRAL DIAS: 10 CM/S
LEFT VERTEBRAL SYS: 47 CM/S
OHS CV CAROTID RIGHT ICA EDV HIGHEST: 22
OHS CV CAROTID ULTRASOUND LEFT ICA/CCA RATIO: 0.85
OHS CV CAROTID ULTRASOUND RIGHT ICA/CCA RATIO: 1.41
OHS CV LEFT LOWER EXTREMITY ABI (NO CALC): 0.89
OHS CV PV CAROTID LEFT HIGHEST CCA: 110
OHS CV PV CAROTID LEFT HIGHEST ICA: 60
OHS CV PV CAROTID RIGHT HIGHEST CCA: 84
OHS CV PV CAROTID RIGHT HIGHEST ICA: 106
OHS CV RIGHT ABI LOWER EXTREMITY (NO CALC): 0.93
OHS CV US CAROTID LEFT HIGHEST EDV: 18
RIGHT ANT TIBIAL SYS PSV: 41 CM/S
RIGHT ARM DIASTOLIC BLOOD PRESSURE: 81 MMHG
RIGHT ARM SYSTOLIC BLOOD PRESSURE: 137 MMHG
RIGHT CBA DIAS: 12 CM/S
RIGHT CBA SYS: 58 CM/S
RIGHT CCA DIST DIAS: 13 CM/S
RIGHT CCA DIST SYS: 75 CM/S
RIGHT CCA MID DIAS: 15 CM/S
RIGHT CCA MID SYS: 84 CM/S
RIGHT CCA PROX DIAS: 11 CM/S
RIGHT CCA PROX SYS: 78 CM/S
RIGHT CFA PSV: 171 CM/S
RIGHT ECA DIAS: 10 CM/S
RIGHT ECA SYS: 70 CM/S
RIGHT ICA DIST DIAS: 18 CM/S
RIGHT ICA DIST SYS: 82 CM/S
RIGHT ICA MID DIAS: 14 CM/S
RIGHT ICA MID SYS: 70 CM/S
RIGHT ICA PROX DIAS: 22 CM/S
RIGHT ICA PROX SYS: 106 CM/S
RIGHT PERONEAL SYS PSV: 30 CM/S
RIGHT POPLITEAL PSV: 43 CM/S
RIGHT POST TIBIAL SYS PSV: 47 CM/S
RIGHT PROFUNDA SYS PSV: 155 CM/S
RIGHT SUPER FEMORAL DIST SYS PSV: 119 CM/S
RIGHT SUPER FEMORAL MID SYS PSV: 98 CM/S
RIGHT SUPER FEMORAL OSTIAL SYS PSV: 140 CM/S
RIGHT SUPER FEMORAL PROX SYS PSV: 109 CM/S
RIGHT TIB/PER TRUNK SYS PSV: 46 CM/S
RIGHT VERTEBRAL DIAS: 10 CM/S
RIGHT VERTEBRAL SYS: 38 CM/S

## 2023-11-02 ENCOUNTER — TELEPHONE (OUTPATIENT)
Dept: PAIN MEDICINE | Facility: CLINIC | Age: 78
End: 2023-11-02

## 2023-11-02 NOTE — TELEPHONE ENCOUNTER
Spoke with patient, states he is trying to get his appointment moved up to before 11/20 because he is taking a long drive to visit family. Patient advised that at this time there are no openings before 11/20. Patient verbalized understanding.

## 2023-11-02 NOTE — TELEPHONE ENCOUNTER
----- Message from Virginia William sent at 11/2/2023  9:45 AM CDT -----  Regarding: rt call  Contact: pt  Type:  Patient Returning Call    Who Called:pt  Who Left Message for Patient:[place] CAIN  Does the patient know what this is regarding?:yes  Would the patient rather a call back or a response via [facility]? Call back  Best Call Back Number:[phone]    Additional Information: sts he is returning a call--please advise

## 2023-11-02 NOTE — TELEPHONE ENCOUNTER
----- Message from Penny Ugarte MA sent at 11/1/2023 10:13 AM CDT -----  Type: Needs Medical Advice  Who Called:  pt   Symptoms (please be specific):  na  How long has patient had these symptoms:  na  Pharmacy name and phone #:    [id number] - [place], [place] - [address] [address]  [address]  [facility] [address]  Phone: [phone] Fax: [phone]    [facility] - [place], [place] - [address]  [address] [address]  Phone: [phone] Fax: [phone]    [facility] - [place], [place] - [address]  [address] [address]  Phone: [phone] Fax: [phone]    [id number] - [place], [place] - [address]  [facility] [address]  Phone: [phone] Fax: [phone]    [id number] - [place], [place] - [address] [address]  [address] [address] [address]  Phone: [phone] Fax: [phone]    [id number] - [place], [place] - [address] [address]  [address]  [facility] [address]  Phone: [phone] Fax: [phone]      Best Call Back Number: [phone]    Additional Information: please advise pt want to push up his injection

## 2023-11-02 NOTE — TELEPHONE ENCOUNTER
Attempted to call patient to rescheduled procedure as requested, no answer, voice message left with callback number.

## 2023-11-02 NOTE — TELEPHONE ENCOUNTER
----- Message from Nicolette Long sent at 11/2/2023  1:47 PM CDT -----  Contact: pt  Type:  Patient Returning Call    Who Called:  patient  Who Left Message for Patient:  [place]  Does the patient know what this is regarding?:    Best Call Back Number:  [phone]  Additional Information:

## 2023-11-08 ENCOUNTER — TELEPHONE (OUTPATIENT)
Dept: FAMILY MEDICINE | Facility: CLINIC | Age: 78
End: 2023-11-08
Payer: MEDICARE

## 2023-11-08 ENCOUNTER — TELEPHONE (OUTPATIENT)
Dept: CARDIOLOGY | Facility: CLINIC | Age: 78
End: 2023-11-08
Payer: MEDICARE

## 2023-11-08 NOTE — TELEPHONE ENCOUNTER
----- Message from Rose Owens sent at 11/8/2023  3:22 PM CST -----  Regarding: Needs return call  Type: Needs Medical Advice  Who Called:  MICKEY    Best Call Back Number: [phone]    Additional Information: Pt was trying to fix his schedule he was under the  impression his appt was moved to the 13th but he was just trying to get him and his wife seen the same day please call to thania

## 2023-11-08 NOTE — TELEPHONE ENCOUNTER
----- Message from Gino Hernandez sent at 11/8/2023 12:14 PM CST -----  Type: Needs Medical Advice  Who Called:  pt  Symptoms (please be specific):  pt said he need a medical clearance-said he have an appt on 12/6 but that's to early to get the clearance--please call and advise  Best Call Back Number: [phone] (home)     Additional Information: thank you

## 2023-11-08 NOTE — TELEPHONE ENCOUNTER
Pt is scheduled for cervial repair of ruptured disc  w/ Dr. Marcus on 01/11/23.  Pt is currently taking Plavix 75mg daily.  LOV 10/16/23  Pls fax to [facility]: Cindy Iraheta   Fax [phone]   Tel [phone]

## 2023-11-28 ENCOUNTER — TELEPHONE (OUTPATIENT)
Dept: PAIN MEDICINE | Facility: CLINIC | Age: 78
End: 2023-11-28
Payer: MEDICARE

## 2023-11-28 NOTE — TELEPHONE ENCOUNTER
----- Message from Sarai Nicholas sent at 11/27/2023  8:46 AM CST -----  Contact: self  Type: Needs Medical Advice  Who Called:  pt  Best Call Back Number: [phone]   Additional Information: please call to discuss instructions prior to procedure on 12/7

## 2023-12-07 ENCOUNTER — HOSPITAL ENCOUNTER (OUTPATIENT)
Dept: RADIOLOGY | Facility: HOSPITAL | Age: 78
Discharge: HOME | End: 2023-12-07
Attending: ANESTHESIOLOGY | Admitting: ANESTHESIOLOGY
Payer: MEDICARE

## 2023-12-07 ENCOUNTER — HOSPITAL ENCOUNTER (OUTPATIENT)
Facility: HOSPITAL | Age: 78
Discharge: HOME | End: 2023-12-07
Attending: ANESTHESIOLOGY | Admitting: ANESTHESIOLOGY
Payer: MEDICARE

## 2023-12-07 VITALS
TEMPERATURE: 97 F | WEIGHT: 150 LBS | RESPIRATION RATE: 16 BRPM | DIASTOLIC BLOOD PRESSURE: 78 MMHG | HEART RATE: 83 BPM | HEIGHT: 66 IN | OXYGEN SATURATION: 98 % | BODY MASS INDEX: 24.11 KG/M2 | SYSTOLIC BLOOD PRESSURE: 145 MMHG

## 2023-12-07 DIAGNOSIS — M54.2 NECK PAIN: ICD-10-CM

## 2023-12-07 DIAGNOSIS — M54.12 CERVICAL RADICULOPATHY: ICD-10-CM

## 2023-12-07 PROCEDURE — 62321 PR INJ CERV/THORAC, W/GUIDANCE: ICD-10-PCS | Mod: HCNC,,, | Performed by: ANESTHESIOLOGY

## 2023-12-07 PROCEDURE — 25000003 PHARM REV CODE 250: Mod: HCNC,PO | Performed by: ANESTHESIOLOGY

## 2023-12-07 PROCEDURE — 76000 FLUOROSCOPY <1 HR PHYS/QHP: CPT | Mod: TC,HCNC,PO

## 2023-12-07 PROCEDURE — 25500020 PHARM REV CODE 255: Mod: HCNC,PO | Performed by: ANESTHESIOLOGY

## 2023-12-07 PROCEDURE — 62321 NJX INTERLAMINAR CRV/THRC: CPT | Mod: HCNC,,, | Performed by: ANESTHESIOLOGY

## 2023-12-07 PROCEDURE — A4216 STERILE WATER/SALINE, 10 ML: HCPCS | Mod: HCNC,PO | Performed by: ANESTHESIOLOGY

## 2023-12-07 PROCEDURE — 63600175 PHARM REV CODE 636 W HCPCS: Mod: HCNC,PO | Performed by: ANESTHESIOLOGY

## 2023-12-07 PROCEDURE — 62321 NJX INTERLAMINAR CRV/THRC: CPT | Mod: HCNC,PO | Performed by: ANESTHESIOLOGY

## 2023-12-07 RX ORDER — SODIUM CHLORIDE 9 MG/ML
INJECTION, SOLUTION INTRAMUSCULAR; INTRAVENOUS; SUBCUTANEOUS
Status: DISCONTINUED | OUTPATIENT
Start: 2023-12-07 | End: 2023-12-07 | Stop reason: HOSPADM

## 2023-12-07 RX ORDER — LIDOCAINE HYDROCHLORIDE 10 MG/ML
INJECTION, SOLUTION EPIDURAL; INFILTRATION; INTRACAUDAL; PERINEURAL
Status: DISCONTINUED | OUTPATIENT
Start: 2023-12-07 | End: 2023-12-07 | Stop reason: HOSPADM

## 2023-12-07 RX ORDER — MIDAZOLAM HYDROCHLORIDE 2 MG/2ML
INJECTION, SOLUTION INTRAMUSCULAR; INTRAVENOUS
Status: DISCONTINUED | OUTPATIENT
Start: 2023-12-07 | End: 2023-12-07 | Stop reason: HOSPADM

## 2023-12-07 RX ORDER — METHYLPREDNISOLONE ACETATE 80 MG/ML
INJECTION, SUSPENSION INTRA-ARTICULAR; INTRALESIONAL; INTRAMUSCULAR; SOFT TISSUE
Status: DISCONTINUED | OUTPATIENT
Start: 2023-12-07 | End: 2023-12-07 | Stop reason: HOSPADM

## 2023-12-07 RX ORDER — SODIUM CHLORIDE, SODIUM LACTATE, POTASSIUM CHLORIDE, CALCIUM CHLORIDE 600; 310; 30; 20 MG/100ML; MG/100ML; MG/100ML; MG/100ML
INJECTION, SOLUTION INTRAVENOUS CONTINUOUS
Status: DISCONTINUED | OUTPATIENT
Start: 2023-12-07 | End: 2023-12-07 | Stop reason: HOSPADM

## 2023-12-07 RX ADMIN — SODIUM CHLORIDE, POTASSIUM CHLORIDE, SODIUM LACTATE AND CALCIUM CHLORIDE: 600; 310; 30; 20 INJECTION, SOLUTION INTRAVENOUS at 01:12

## 2023-12-07 NOTE — OP NOTE
PROCEDURE DATE: 12/7/2023    Procedure: C7-T1 cervical interlaminar epidural steroid injection under utilizing fluoroscopy.    Diagnosis: Cervical Radiculopathy    POSTOP DIAGNOSIS: SAME    Physician: Edwin Helton MD    Medications injected:  Methylprednisone 80mg followed by a slow injection of 4 mL sterile, preservative-free normal saline.    Local anesthetic used: Lidocaine 1%, 4 ml.    Sedation Medications: 2mg versed    Complications:  none    Estimated blood loss: none    Technique:  A time-out was taken to identify patient and procedure prior to starting the procedure.  With the patient laying in a prone position with the neck in a mid-flexed forward position, the area was prepped and draped in the usual sterile fashion using ChloraPrep and a fenestrated drape.  The area was determined under AP fluoroscopic guidance.  Local anesthetic was given using a 25-gauge 1.5 inch needle by raising a wheal and then infiltrating ventrally.  A 3.5 inch 20-gauge Touhy needle was introduced under fluoroscopic guidance to meet the lamina of C7.  The needle was then hinged under the lamina then advanced using loss of resistance technique.  Once the tip of the needle was in the desired position, the contrast dye Omnipaque was injected to determine placement and no uptake.  The steroid was then injected slowly followed by a slow injection of 4 mL of the sterile preservative-free normal saline.  The patient tolerated the procedure well.    The patient was monitored after the procedure and was given post-procedure and discharge instructions to follow at home. The patient was discharged in a stable condition.

## 2023-12-07 NOTE — DISCHARGE SUMMARY
[facility] - Surgery  Discharge Note  Short Stay    Procedure(s) (LRB):  Injection-steroid-epidural-cervical    C7/T1 (N/A)      OUTCOME: Patient tolerated treatment/procedure well without complication and is now ready for discharge.    DISPOSITION: Home or Self Care    FINAL DIAGNOSIS:  Cervical radiculopathy    FOLLOWUP: In clinic    DISCHARGE INSTRUCTIONS:    Discharge Procedure Orders   Diet Adult Regular     No dressing needed     Notify your health care provider if you experience any of the following:  temperature >100.4     Activity as tolerated

## 2023-12-07 NOTE — H&P
CC: Neck pain    HPI: The patient is a 79yo man with a history of cervical radiculopathy here for cervical YING. There are no major changes in history and physical from 10/17/23 by Niyah DEL VALLE.    Past Medical History:   Diagnosis Date    Cataract     OU    Claudication of lower extremity     Colon polyp     COPD (chronic obstructive pulmonary disease)     Hx of mumps     Hyperlipidemia     Kidney stone     Left carotid stenosis     procedure done to fix    Lumbar disc disorder     PVD (peripheral vascular disease)     Sleep apnea     uses cpap    Tobacco dependence        Past Surgical History:   Procedure Laterality Date    CAROTID ENDARTERECTOMY Left 2015    after 6/2015    CAROTID ENDARTERECTOMY Left 06/2016    CATARACT EXTRACTION Bilateral     COLONOSCOPY  03/05/2013    Dr. Morton, repeat in 5 years for surveillance    COLONOSCOPY  2007    in [facility]    COLONOSCOPY N/A 5/15/2018    Procedure: COLONOSCOPY;  Surgeon: Quentin Morton MD;  Location: [facility] ENDO;  Service: Endoscopy;  Laterality: N/A; Erythematous, granular and inflamed mucosa in the entire colon; biopsy: colon- Chronic active colitis with cryptitis and crypt abscesses. 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    COLONOSCOPY N/A 2/19/2021    Procedure: COLONOSCOPY;  Surgeon: Rishi Nino MD;  Location: [facility] ENDO;  Service: Endoscopy;  Laterality: N/A;    COMPUTED TOMOGRAPHY ANGIOGRAM Left 05/26/2016    Leg    EPIDURAL BLOCK INJECTION      EYE SURGERY      FEMORAL ARTERY STENT  2010    FEMORAL ARTERY STENT Left 05/26/2016    LAMINECTOMY  2012    left sup fem and prox popliteal stents  9/30/15    x 2    TONSILLECTOMY      UVULECTOMY         Family History   Problem Relation Age of Onset    Cataracts Mother     Alzheimer's disease Mother     Cancer Father         prostate    Cataracts Sister     Amblyopia Neg Hx     Blindness Neg Hx     Diabetes Neg Hx     Glaucoma Neg Hx     Hypertension Neg Hx     Macular  degeneration Neg Hx     Retinal detachment Neg Hx     Strabismus Neg Hx     Stroke Neg Hx     Thyroid disease Neg Hx     Colon cancer Neg Hx     Colon polyps Neg Hx     Crohn's disease Neg Hx     Celiac disease Neg Hx     Ulcerative colitis Neg Hx        Social History     Socioeconomic History    Marital status:    Tobacco Use    Smoking status: Every Day     Current packs/day: 1.00     Average packs/day: 1 pack/day for 55.9 years (55.9 ttl pk-yrs)     Types: Cigarettes     Start date: 1968    Smokeless tobacco: Never    Tobacco comments:     vapeing   Substance and Sexual Activity    Alcohol use: Yes     Alcohol/week: 0.0 - 2.0 standard drinks of alcohol     Comment: per week    Drug use: No     Social Determinants of Health     Financial Resource Strain: Low Risk  (5/2/2023)    Overall Financial Resource Strain (CARDIA)     Difficulty of Paying Living Expenses: Not hard at all   Food Insecurity: No Food Insecurity (5/2/2023)    Hunger Vital Sign     Worried About Running Out of Food in the Last Year: Never true     Ran Out of Food in the Last Year: Never true   Transportation Needs: No Transportation Needs (5/2/2023)    PRAPARE - Transportation     Lack of Transportation (Medical): No     Lack of Transportation (Non-Medical): No   Physical Activity: Unknown (5/2/2023)    Exercise Vital Sign     Days of Exercise per Week: 5 days   Stress: Stress Concern Present (5/2/2023)    [place] [employer] [employer] - Occupational Stress Questionnaire     Feeling of Stress : To some extent   Social Connections: Moderately Integrated (5/2/2023)    Social Connection and Isolation Panel [NHANES]     Frequency of Communication with Friends and Family: More than three times a week     Frequency of Social Gatherings with Friends and Family: More than three times a week     Attends [employer] Services: More than 4 times per year     Active Member of Clubs or Organizations: No     Attends Club or Organization  "Meetings: Never     Marital Status:    Housing Stability: Low Risk  (5/2/2023)    Housing Stability Vital Sign     Unable to Pay for Housing in the Last Year: No     Number of Places Lived in the Last Year: 1     Unstable Housing in the Last Year: No       No current facility-administered medications for this encounter.       Review of patient's allergies indicates:   Allergen Reactions    No known drug allergies        Vitals:    12/04/23 1321 12/07/23 1331   BP:  (!) 186/81   Pulse:  77   Resp:  16   Temp:  97.4 °F (36.3 °C)   Weight: 68 kg (150 lb)    Height: 5' 6" (1.676 m)        ASA 2, Mallampati 2    REVIEW OF SYSTEMS:     GENERAL: No weight loss, malaise or fevers.  HEENT:  No recent changes in vision or hearing  NECK: Negative for lumps, no difficulty with swallowing.  RESPIRATORY: Negative for cough, wheezing or shortness of breath, patient denies any recent URI.  CARDIOVASCULAR: Negative for chest pain, leg swelling or palpitations.  GI: Negative for abdominal discomfort, blood in stools or black stools or change in bowel habits.  MUSCULOSKELETAL: See HPI.  SKIN: Negative for lesions, rash, and itching.  PSYCH: No suicidal or homicidal ideations, no current mood disturbances.  HEMATOLOGY/LYMPHOLOGY: Negative for prolonged bleeding, bruising easily or swollen nodes. Patient is not currently taking any anti-coagulants  ENDO: No history of diabetes or thyroid dysfunction  NEURO: No history of syncope, paralysis, seizures or tremors.All other reviewed and negative other than HPI.    Physical exam:  Gen: A and O x3, pleasant, well-groomed  Skin: No rashes or obvious lesions  HEENT: PERRLA, no obvious deformities on ears or in canals. No thyroid masses, trachea midline, no palpable lymph nodes in neck, axilla.  CVS: Regular rate and rhythm, normal S1 and S2, no murmurs.  Resp: Clear to auscultation bilaterally.  Abdomen: Soft, NT/ND, normal bowel sounds present.  Musculoskeletal/Neuro: Moving all " extremities    Assessment:  Cervical radiculopathy  -     Case Request Operating Room: Injection-steroid-epidural-cervical    C7/T1  -     Vital signs; Standing  -     Place 18-22 gauage peripheral IV ; Standing  -     Verify informed consent; Standing  -     Notify physician ; Standing  -     Notify physician ; Standing  -     Notify physician (specify); Standing  -     Diet NPO; Standing  -     lactated ringers infusion  -     Place in Outpatient; Standing    Other orders  -     IP VTE LOW RISK PATIENT; Standing

## 2023-12-13 ENCOUNTER — OFFICE VISIT (OUTPATIENT)
Dept: FAMILY MEDICINE | Facility: CLINIC | Age: 78
End: 2023-12-13
Payer: MEDICARE

## 2023-12-13 VITALS
RESPIRATION RATE: 16 BRPM | BODY MASS INDEX: 25.14 KG/M2 | HEART RATE: 86 BPM | DIASTOLIC BLOOD PRESSURE: 80 MMHG | SYSTOLIC BLOOD PRESSURE: 138 MMHG | HEIGHT: 66 IN | OXYGEN SATURATION: 97 % | WEIGHT: 156.44 LBS

## 2023-12-13 DIAGNOSIS — K51.019 ULCERATIVE COLITIS, UNIVERSAL, UNSPECIFIED COMPLICATION: ICD-10-CM

## 2023-12-13 DIAGNOSIS — M50.30 DDD (DEGENERATIVE DISC DISEASE), CERVICAL: Primary | ICD-10-CM

## 2023-12-13 DIAGNOSIS — E78.5 HYPERLIPIDEMIA, UNSPECIFIED HYPERLIPIDEMIA TYPE: ICD-10-CM

## 2023-12-13 DIAGNOSIS — G47.33 OBSTRUCTIVE SLEEP APNEA ON CPAP: ICD-10-CM

## 2023-12-13 PROCEDURE — 3075F PR MOST RECENT SYSTOLIC BLOOD PRESS GE 130-139MM HG: ICD-10-PCS | Mod: HCNC,CPTII,S$GLB, | Performed by: FAMILY MEDICINE

## 2023-12-13 PROCEDURE — 99999 PR PBB SHADOW E&M-EST. PATIENT-LVL III: CPT | Mod: PBBFAC,HCNC,, | Performed by: FAMILY MEDICINE

## 2023-12-13 PROCEDURE — 99999 PR PBB SHADOW E&M-EST. PATIENT-LVL III: ICD-10-PCS | Mod: PBBFAC,HCNC,, | Performed by: FAMILY MEDICINE

## 2023-12-13 PROCEDURE — 1160F RVW MEDS BY RX/DR IN RCRD: CPT | Mod: HCNC,CPTII,S$GLB, | Performed by: FAMILY MEDICINE

## 2023-12-13 PROCEDURE — 99214 PR OFFICE/OUTPT VISIT, EST, LEVL IV, 30-39 MIN: ICD-10-PCS | Mod: HCNC,S$GLB,, | Performed by: FAMILY MEDICINE

## 2023-12-13 PROCEDURE — 3079F PR MOST RECENT DIASTOLIC BLOOD PRESSURE 80-89 MM HG: ICD-10-PCS | Mod: HCNC,CPTII,S$GLB, | Performed by: FAMILY MEDICINE

## 2023-12-13 PROCEDURE — 99214 OFFICE O/P EST MOD 30 MIN: CPT | Mod: HCNC,S$GLB,, | Performed by: FAMILY MEDICINE

## 2023-12-13 PROCEDURE — 1159F MED LIST DOCD IN RCRD: CPT | Mod: HCNC,CPTII,S$GLB, | Performed by: FAMILY MEDICINE

## 2023-12-13 PROCEDURE — 3079F DIAST BP 80-89 MM HG: CPT | Mod: HCNC,CPTII,S$GLB, | Performed by: FAMILY MEDICINE

## 2023-12-13 PROCEDURE — 1159F PR MEDICATION LIST DOCUMENTED IN MEDICAL RECORD: ICD-10-PCS | Mod: HCNC,CPTII,S$GLB, | Performed by: FAMILY MEDICINE

## 2023-12-13 PROCEDURE — 3075F SYST BP GE 130 - 139MM HG: CPT | Mod: HCNC,CPTII,S$GLB, | Performed by: FAMILY MEDICINE

## 2023-12-13 PROCEDURE — 1160F PR REVIEW ALL MEDS BY PRESCRIBER/CLIN PHARMACIST DOCUMENTED: ICD-10-PCS | Mod: HCNC,CPTII,S$GLB, | Performed by: FAMILY MEDICINE

## 2023-12-13 NOTE — PATIENT INSTRUCTIONS
Ask your pharmacist about the RCA vaccine in about the shingles vaccine.  Just will not go   Topical Retinoid Pregnancy And Lactation Text: This medication is Pregnancy Category C. It is unknown if this medication is excreted in breast milk.

## 2023-12-13 NOTE — PROGRESS NOTES
"Subjective:       Patient ID: Castillo Anderson Jr. is a 78 y.o. male.    Chief Complaint: Establish Care    HPI:  Pleasant 78-year-old gentleman here today to establish care.  He has a plethora of diagnoses currently nothing is active.  He is got some peripheral vascular disease.  Also ulcerative colitis which is followed by GI periodic.  He is got hyperlipidemia that has been managed to agents.  Generalized anxiety which is treated well currently on his regimen.  Recently had a cardiac stress test which.  He is looking at potentially having some surgery on his cervical spine for degenerative disc disease.  He may have been due it doing fairly well right after having injections.  As far as I am concerned he is cleared for surgery.  Currently on a CPAP machine for RODNEY.    Review of Systems   Constitutional: Negative.    HENT: Negative.     Eyes: Negative.    Respiratory: Negative.     Cardiovascular: Negative.    Gastrointestinal: Negative.    Endocrine: Negative.    Genitourinary: Negative.    Musculoskeletal: Negative.    Skin: Negative.    Allergic/Immunologic: Negative.    Neurological: Negative.    Hematological: Negative.    Psychiatric/Behavioral: Negative.         Objective:      Vitals:    12/13/23 0935   BP: 138/80   BP Location: Left arm   Patient Position: Sitting   BP Method: Medium (Manual)   Pulse: 86   Resp: 16   SpO2: 97%   Weight: 70.9 kg (156 lb 6.7 oz)   Height: 5' 6" (1.676 m)      Physical Exam  Vitals and nursing note reviewed.   Constitutional:       Appearance: Normal appearance. He is obese.   HENT:      Head: Normocephalic and atraumatic.      Nose: Nose normal.      Mouth/Throat:      Mouth: Mucous membranes are moist.      Pharynx: Oropharynx is clear.   Eyes:      Extraocular Movements: Extraocular movements intact.      Conjunctiva/sclera: Conjunctivae normal.      Pupils: Pupils are equal, round, and reactive to light.   Cardiovascular:      Rate and Rhythm: Normal rate and regular " rhythm.   Pulmonary:      Effort: Pulmonary effort is normal.      Breath sounds: Normal breath sounds.   Abdominal:      General: Abdomen is flat. Bowel sounds are normal.      Palpations: Abdomen is soft.   Musculoskeletal:         General: Normal range of motion.      Cervical back: Normal range of motion and neck supple.   Skin:     General: Skin is warm and dry.      Capillary Refill: Capillary refill takes less than 2 seconds.   Neurological:      General: No focal deficit present.      Mental Status: He is alert and oriented to person, place, and time.   Psychiatric:         Mood and Affect: Mood normal.         Behavior: Behavior normal.         Thought Content: Thought content normal.         Judgment: Judgment normal.         Results for orders placed or performed in visit on 10/27/23   CV Ultrasound doppler arterial legs bilat   Result Value Ref Range    Right CFA  cm/s    Right profunda sys  cm/s    Right super femoral prox sys  cm/s    Right super femoral mid sys PSV 98 cm/s    Right super femoral dist sys  cm/s    Right popliteal PSV 43 cm/s    Right ant tibial sys PSV 41 cm/s    Right post tibial sys PSV 47 cm/s    Right peroneal sys PSV 30 cm/s    Left CFA  cm/s    Left profunda sys  cm/s    Left super femoral prox sys  cm/s    Left super femoral mid sys  cm/s    Left super femoral dist sys PSV 89 cm/s    Left popliteal PSV 55 cm/s    Left ant tibial sys PSV 30 cm/s    Left post tibial sys PSV 39 cm/s    Left peroneal sys PSV 29 cm/s    Right LIZZIE 0.93     Right super femoral ostial sys .00 cm/s    Right tib/per trunk sys PSV 46.00 cm/s    Left LIZZIE 0.89     Left super femoral ostial sys .00 cm/s    Left tib/per trunk sys PSV 63.00 cm/s      Assessment:       1. DDD (degenerative disc disease), cervical    2. Hyperlipidemia, unspecified hyperlipidemia type    3. Obstructive sleep apnea on CPAP    4. Ulcerative colitis, universal,  unspecified complication        Plan:       DDD (degenerative disc disease), cervical  Comments:  Surgery is scheduled on his neck for January.  If he does go forward with that he is cleared for surgery from my standpoint.    Hyperlipidemia, unspecified hyperlipidemia type  Comments:  Continue with current course of medication for lipids.    Obstructive sleep apnea on CPAP  Comments:  Continue with CPAP should be seen every 5 years by sleep medicine.    Ulcerative colitis, universal, unspecified complication  Comments:  GI follows worked for bowel which was.  Quickly can          Medication List with Changes/Refills   Current Medications    CLOPIDOGREL (PLAVIX) 75 MG TABLET    Take 1 tablet (75 mg total) by mouth once daily.    EZETIMIBE (ZETIA) 10 MG TABLET    TAKE 1 TABLET EVERY DAY    MULTIVIT WITH MINERALS/LUTEIN (MULTIVITAMIN 50 PLUS ORAL)    multivitamin    NITROGLYCERIN (NITROSTAT) 0.4 MG SL TABLET    nitroglycerin 0.4 mg sublingual tablet    ROSUVASTATIN (CRESTOR) 20 MG TABLET    TAKE 1 TABLET EVERY DAY    SERTRALINE (ZOLOFT) 50 MG TABLET        TRAZODONE (DESYREL) 50 MG TABLET    Take 50 mg by mouth every evening. Every day   Discontinued Medications    DULOXETINE (CYMBALTA) 60 MG CAPSULE    Take 1 capsule every day by oral route.    ESCITALOPRAM OXALATE (LEXAPRO) 10 MG TABLET    Take 1 tablet every day by oral route for 90 days.    FLUTICASONE-UMECLIDIN-VILANTER (TRELEGY ELLIPTA) 100-62.5-25 MCG DSDV    Inhale 1 puff into the lungs once daily.

## 2023-12-26 PROBLEM — R91.8 GROUND GLASS OPACITY PRESENT ON IMAGING OF LUNG: Status: ACTIVE | Noted: 2023-12-26

## 2024-01-23 DIAGNOSIS — E78.5 HLD (HYPERLIPIDEMIA): ICD-10-CM

## 2024-01-23 RX ORDER — EZETIMIBE 10 MG/1
10 TABLET ORAL DAILY
Qty: 90 TABLET | Refills: 3 | Status: SHIPPED | OUTPATIENT
Start: 2024-01-23

## 2024-01-23 NOTE — TELEPHONE ENCOUNTER
Meets criteria.   Doing well on Adderall 10 mg in am, has not needed 2nd dose much.    States has not taken medication much lately, not sure if he wants to continue using it.  Not refilled today.   Both of sons have ADD.   Symptoms started at young age.    Has stress but pt feels it is due to ADD issues.   No h/o CAD.   EKG - RBBB.  Echo and stress test normal.  BP well controlled.   Plans on retiring at 63 y/o so will stop at that time since mainly wants it to function at work.    CSA- signed 1/2023.  Urine tox collected 1/2023.  F/u 3 months.       No care due was identified.  Health [facility] Embedded Care Due Messages. Reference number: [id number].   1/23/2024 3:12:17 PM CST

## 2024-02-02 RX ORDER — ROSUVASTATIN CALCIUM 20 MG/1
20 TABLET, COATED ORAL DAILY
Qty: 90 TABLET | Refills: 3 | Status: SHIPPED | OUTPATIENT
Start: 2024-02-02

## 2024-02-02 NOTE — TELEPHONE ENCOUNTER
No care due was identified.  Health [facility] Embedded Care Due Messages. Reference number: [id number].   2/02/2024 4:01:29 PM CST

## 2024-04-16 ENCOUNTER — OFFICE VISIT (OUTPATIENT)
Dept: OTOLARYNGOLOGY | Facility: CLINIC | Age: 79
End: 2024-04-16
Payer: MEDICARE

## 2024-04-16 VITALS — BODY MASS INDEX: 25.72 KG/M2 | HEIGHT: 66 IN | WEIGHT: 160.06 LBS

## 2024-04-16 DIAGNOSIS — R49.0 DYSPHONIA: ICD-10-CM

## 2024-04-16 DIAGNOSIS — J38.01 PARALYSIS OF LEFT VOCAL CORD: Primary | ICD-10-CM

## 2024-04-16 DIAGNOSIS — R13.19 ESOPHAGEAL DYSPHAGIA: ICD-10-CM

## 2024-04-16 PROCEDURE — 31575 DIAGNOSTIC LARYNGOSCOPY: CPT | Mod: S$GLB,,, | Performed by: NURSE PRACTITIONER

## 2024-04-16 PROCEDURE — 1101F PT FALLS ASSESS-DOCD LE1/YR: CPT | Mod: CPTII,S$GLB,, | Performed by: NURSE PRACTITIONER

## 2024-04-16 PROCEDURE — 99213 OFFICE O/P EST LOW 20 MIN: CPT | Mod: 25,S$GLB,, | Performed by: NURSE PRACTITIONER

## 2024-04-16 PROCEDURE — 1159F MED LIST DOCD IN RCRD: CPT | Mod: CPTII,S$GLB,, | Performed by: NURSE PRACTITIONER

## 2024-04-16 PROCEDURE — 99999 PR PBB SHADOW E&M-EST. PATIENT-LVL III: CPT | Mod: PBBFAC,,, | Performed by: NURSE PRACTITIONER

## 2024-04-16 PROCEDURE — 3288F FALL RISK ASSESSMENT DOCD: CPT | Mod: CPTII,S$GLB,, | Performed by: NURSE PRACTITIONER

## 2024-04-16 PROCEDURE — 1126F AMNT PAIN NOTED NONE PRSNT: CPT | Mod: CPTII,S$GLB,, | Performed by: NURSE PRACTITIONER

## 2024-04-16 NOTE — PROGRESS NOTES
Otolaryngology Clinic Note    Subjective:       Patient ID: Castillo Anderson Jr. is a 78 y.o. male.    Chief Complaint: Dysphagia      History of Present Illness: Castillo Anderson Jr. is a 78 y.o. male presenting with hoarseness and swallowing issues.    He states a few months ago he had neck fusion, C3-7 he believes. Last saw Dr. Jett in 2022 for hoarseness/swallowing concerns. 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 He had a CT ordered of his chest/neck and discussed treatment options for his VC paralysis but he deferred intervention at that time.  Since his last appointment in 2022, things were pretty stable. Since his fusion he is experiencing more difficulty with swallowing and some discomfort on his left side. He was having dysphagia prior to the fusion but more frequent then before and difficulty with swallowing liquids. He feels like it gets stuck. He denies any cough. He states the discomfort on the left side is daily, pressure type feeling, can last for a few hours or longer, happens daily. Nothing correlated to the feeling. His voice is more raspy. He is unsure of the voice quality has changed.   He has never had to have an esophogeal dilation. He denies any reflux, heartburn, runny nose, congestion, PND, sore throat. He is still smoking. He drinks 1 bourbon a night.       Past Surgical History:   Procedure Laterality Date    CAROTID ENDARTERECTOMY Left 2015    after 6/2015    CAROTID ENDARTERECTOMY Left 06/2016    CATARACT EXTRACTION Bilateral     COLONOSCOPY  03/05/2013    Dr. Morton, repeat in 5 years for surveillance    [facility]    COLONOSCOPY N/A 5/15/2018    Procedure: COLONOSCOPY;  Surgeon: Quentin Morton MD;  Location: [facility];  Service: Endoscopy;  Laterality: N/A; Erythematous, granular and inflamed mucosa in the entire colon; biopsy: colon- Chronic active colitis with cryptitis and crypt abscesses. rectum-  Chronic active colitis with cryptitis and crypt abscess    COLONOSCOPY N/A 2/19/2021    Procedure: COLONOSCOPY;  Surgeon: Rishi Nino MD;  [place] [facility];  Service: Endoscopy;  Laterality: N/A;    COMPUTED TOMOGRAPHY ANGIOGRAM Left 05/26/2016    Leg    EPIDURAL BLOCK INJECTION      EPIDURAL STEROID INJECTION INTO CERVICAL SPINE N/A 12/7/2023    Procedure: Injection-steroid-epidural-cervical    C7/T1;  Surgeon: Edwin Helton MD;  [place] [facility];  Service: Pain Management;  Laterality: N/A;    EYE SURGERY      FEMORAL ARTERY STENT  2010    FEMORAL ARTERY STENT Left 05/26/2016    LAMINECTOMY  2012    left sup fem and prox popliteal stents  9/30/15    x 2    TONSILLECTOMY      UVULECTOMY       Past Medical History:   Diagnosis Date    Cataract     OU    Claudication of lower extremity     Colon polyp     COPD (chronic obstructive pulmonary disease)     Hx of mumps     Hyperlipidemia     Kidney stone     Left carotid stenosis     procedure done to fix    Lumbar disc disorder     PVD (peripheral vascular disease)     Sleep apnea     uses cpap    Tobacco dependence      Social Determinants of Health     Tobacco Use: High Risk (4/16/2024)    Patient History     Smoking Tobacco Use: Every Day     Smokeless Tobacco Use: Never     Passive Exposure: Not on file   Alcohol Use: Not At Risk (5/2/2023)    AUDIT-C     Frequency of Alcohol Consumption: 4 or more times a week     Average Number of Drinks: 1 or 2     Frequency of Binge Drinking: Never   Financial Resource Strain: Low Risk  (5/2/2023)    Overall Financial Resource Strain (CARDIA)     Difficulty of Paying Living Expenses: Not hard at all   Food Insecurity: No Food Insecurity (5/2/2023)    Hunger Vital Sign     Worried About Running Out of Food in the Last Year: Never true     Ran Out of Food in the Last Year: Never true   Transportation Needs: No Transportation Needs (5/2/2023)    PRAPARE - Transportation     Lack of Transportation (Medical): No      Lack of Transportation (Non-Medical): No   Physical Activity: Unknown (5/2/2023)    Exercise Vital Sign     Days of Exercise per Week: 5 days     Minutes of Exercise per Session: Not on file   Stress: Stress Concern Present (5/2/2023)    [employer] - Occupational Stress Questionnaire     Feeling of Stress : To some extent   Social Connections: Moderately Integrated (5/2/2023)    Social Connection and Isolation Panel [NHANES]     Frequency of Communication with Friends and Family: More than three times a week     Frequency of Social Gatherings with Friends and Family: More than three times a week     Attends Temple Services: More than 4 times per year     Active Member of Clubs or Organizations: No     Attends Club or Organization Meetings: Never     Marital Status:    Housing Stability: Low Risk  (5/2/2023)    Housing Stability Vital Sign     Unable to Pay for Housing in the Last Year: No     Number of Places Lived in the Last Year: 1     Unstable Housing in the Last Year: No   Depression: Low Risk  (5/2/2023)    Depression     Last PHQ-4: Flowsheet Data: 0     Review of patient's allergies indicates:   Allergen Reactions    No known drug allergies      Current Outpatient Medications   Medication Instructions    clopidogreL (PLAVIX) 75 mg, Oral, Daily    ezetimibe (ZETIA) 10 mg, Oral, Daily    fluticasone-umeclidin-vilanter (TRELEGY ELLIPTA) 100-62.5-25 mcg DsDv 1 puff, Inhalation, Daily    multivit with minerals/lutein (MULTIVITAMIN 50 PLUS ORAL) multivitamin    nitroGLYCERIN (NITROSTAT) 0.4 MG SL tablet nitroglycerin 0.4 mg sublingual tablet    roflumilast (DALIRESP) 250 mcg, Oral, Daily    rosuvastatin (CRESTOR) 20 mg, Oral, Daily    sertraline (ZOLOFT) 50 MG tablet No dose, route, or frequency recorded.    traZODone (DESYREL) 50 mg, Oral, Nightly, Every day         ENT ROS negative except as stated above.     Patient answers are not available for this visit.             Objective:      There were no vitals filed for this visit.    General: NAD, well appearing  Eyes: Normal conjunctiva and lids  Face: symmetric, nerve intact  Nose: The nose is without any evidence of any deformity. The nasal mucosa is moist. The septum is midline. There is no evidence of septal hematoma. The turbinates are without abnormality. Clear rhinorrhea noted.   Ears: The ears are with normal-appearing pinna. Examination of the canals is normal appearing bilaterally. There is no drainage or erythema noted. The tympanic membranes are normal appearing with pearly color, normal-appearing landmarks and normal light reflex. Hearing is grossly intact.  Mouth: No obvious abnormalities to the lips. The teeth are unremarkable. The gingivae are without any obvious evidence of infection or lesion. The oral mucosa is moist and pink. There are no obvious masses to the hard or soft palate.   Oropharynx: The uvula is absent.  The tongue is midline. The posterior pharynx is without erythema or exudate. The tonsils are normal appearing.  Salivary glands: The salivary glands are symmetric and not enlarged, no masses  Neck: No lymphadenopathy, trachea midline, thryoid not enlarged.  Psych: Normal mood and affect.   Neuro: Grossly intact  Speech: fluent    Test Review: I personally reviewed CT below    CT soft tissue neck with contrast 4/6/22     TECHNIQUE:  Contrast-enhanced soft tissue neck CT was performed after the uneventful intravenous administration of 75 ml Omnipaque 350.  Sagittal and coronal reformatted images were created.  The study is reviewed in bone and soft tissue windows.     COMPARISON:  Thyroid ultrasound dated 08/30/2019     FINDINGS:  Skull base and brain: The included intracranial contents are essentially normal.  The basilar cisterns are open.  The vessels at the base of the brain enhance normally.  The 4th ventricle is normal in size and position.     Soft tissue: Fat and soft tissue planes are  preserved.     Parotid/Submandibular glands: The parotid and submandibular glands appear normal and symmetric without mass, ductal dilatation or calcification.     Thyroid gland: There is a large hypodense nodule in the lower pole of the right lobe of the thyroid gland which measures 1.8 cm in greatest anterior to posterior dimension.  This is also demonstrated on thyroid ultrasound dated 08/30/2019.     Larynx, pharynx: The posterior nasopharyngeal soft tissues appear normal and symmetric.  The epiglottis is normal.  The vallecula are normal.  The piriform sinuses are clear.  The right vocal fold appears normal.  Medial position of the left vocal folds suggest paralysis or paresis of indeterminate etiology.  There is no obvious mass.  The paraglottic fat appears intact.  There is medial ization of the cricoarytenoid joint.     Lymph nodes: There are shotty lymph nodes throughout the neck bilaterally.  There is no pathologically enlarged or necrotic lymph node.  These are shotty along the internal jugular chain as well as in the submandibular and submental spaces.  These are likely reactive.  There is no enlarged superior mediastinal lymph node.  There are only shotty nodes in the aortopulmonary window without mass.     Vasculature: There are changes of previous left carotid endarterectomy.  The left carotid artery is patent.  There is hard and soft plaque at the right carotid bulb resulting in short segment stenosis of 60% at the origin of the internal carotid artery.  The vertebral arteries are patent.     Airway, lungs: The airway is patent.  There is a 5 mm nodule in the lateral aspect the right upper lobe of the lung which was not clearly present on comparison chest CT dated 03/24/2021 but will be further evaluated on concurrent chest CT.     Sinus/mastoid: There is mild scattered mucosal thickening throughout the anterior ethmoid air cells.  There is also mucosal thickening with secretions in the right  maxillary sinus.  The left maxillary sinus is clear as are the frontal air cells and sphenoid air cells.  The mastoid air cells and middle ear cavities are clear.     Bones: There is degenerative change in the cervical spine with moderate to marked disc space narrowing at the C5-6 level.  There is no fracture.     Impression:     1.  There is paralysis or paresis of the left vocal fold without mass or etiology identified.  There is no pathologic lymphadenopathy.     2.  There is a large hypodense nodule in the lower pole of the right lobe of the thyroid gland which was demonstrated on prior thyroid ultrasound dated 08/30/2019.     3.  There are changes of prior left carotid endarterectomy.  The carotid arteries in the neck are patent but there is a short segment 60% stenosis at the origin of the right internal carotid artery.     4.  There is a 5 mm nodule in the lateral aspect of the right upper lobe of the lung.  This will be further evaluated on concurrent chest CT but was not clearly present on chest CT dated 03/24/2021.      Procedure: Flexible laryngoscopy  Indications: dysphagia, hoarseness  Verbal consent obtained  Anesthesia: lidocaine and phenylephrine nasal spray  Procedure: Scope was passed into right or left nare, to nasopharynx and down to visualize the glottis. Findings below. Patient tolerated procedure well.     - Nose WNL  - Nasopharynx- WNL, no masses  - Oropharynx- BOT symmetric, no masses  - Hypopharynx and piriform sinuses- no masses on trumpet maneuver  - Supraglottis- Arytenoids intact, no masses, not edematous or erythematous  - Glottis- no masses. Left vocal fold: complete immobility, median position   - Glottic closure- complete   Other findings: supraglottic R hyperfunction   Assessment and Plan:       1. Paralysis of left vocal cord    2. Dysphonia    3. Esophageal dysphagia        -referral to speech therapy  -offered injection but patient would prefer to wait and try speech therapy  first  -smoking cessation discussed with patient    RTC: prn or any worsening of symptoms    Plan of care was discussed in detail with the patient, who agreed with the plan as above. All questions were answered in detail.     JULIO RamirezP-C  Otolaryngology     Dr. Jett reviewed scope in clinic

## 2024-06-13 ENCOUNTER — OFFICE VISIT (OUTPATIENT)
Dept: FAMILY MEDICINE | Facility: CLINIC | Age: 79
End: 2024-06-13
Payer: MEDICARE

## 2024-06-13 VITALS
OXYGEN SATURATION: 99 % | BODY MASS INDEX: 23.17 KG/M2 | SYSTOLIC BLOOD PRESSURE: 132 MMHG | HEART RATE: 78 BPM | HEIGHT: 66 IN | DIASTOLIC BLOOD PRESSURE: 70 MMHG | WEIGHT: 144.19 LBS

## 2024-06-13 DIAGNOSIS — J43.2 CENTRILOBULAR EMPHYSEMA: ICD-10-CM

## 2024-06-13 DIAGNOSIS — E78.2 MIXED HYPERLIPIDEMIA: ICD-10-CM

## 2024-06-13 DIAGNOSIS — K51.019 ULCERATIVE COLITIS, UNIVERSAL, UNSPECIFIED COMPLICATION: ICD-10-CM

## 2024-06-13 DIAGNOSIS — F17.200 TOBACCO DEPENDENCE: ICD-10-CM

## 2024-06-13 DIAGNOSIS — M50.30 DDD (DEGENERATIVE DISC DISEASE), CERVICAL: Primary | ICD-10-CM

## 2024-06-13 PROBLEM — M50.20 PROLAPSED CERVICAL INTERVERTEBRAL DISC: Status: ACTIVE | Noted: 2023-11-07

## 2024-06-13 PROBLEM — F33.1 MODERATE EPISODE OF RECURRENT MAJOR DEPRESSIVE DISORDER: Status: RESOLVED | Noted: 2020-03-05 | Resolved: 2024-06-13

## 2024-06-13 PROBLEM — D69.2 SENILE PURPURA: Status: RESOLVED | Noted: 2020-03-05 | Resolved: 2024-06-13

## 2024-06-13 PROCEDURE — 1101F PT FALLS ASSESS-DOCD LE1/YR: CPT | Mod: HCNC,CPTII,S$GLB, | Performed by: FAMILY MEDICINE

## 2024-06-13 PROCEDURE — 3078F DIAST BP <80 MM HG: CPT | Mod: HCNC,CPTII,S$GLB, | Performed by: FAMILY MEDICINE

## 2024-06-13 PROCEDURE — 99999 PR PBB SHADOW E&M-EST. PATIENT-LVL III: CPT | Mod: PBBFAC,HCNC,, | Performed by: FAMILY MEDICINE

## 2024-06-13 PROCEDURE — 1160F RVW MEDS BY RX/DR IN RCRD: CPT | Mod: HCNC,CPTII,S$GLB, | Performed by: FAMILY MEDICINE

## 2024-06-13 PROCEDURE — 1159F MED LIST DOCD IN RCRD: CPT | Mod: HCNC,CPTII,S$GLB, | Performed by: FAMILY MEDICINE

## 2024-06-13 PROCEDURE — 1126F AMNT PAIN NOTED NONE PRSNT: CPT | Mod: HCNC,CPTII,S$GLB, | Performed by: FAMILY MEDICINE

## 2024-06-13 PROCEDURE — 99214 OFFICE O/P EST MOD 30 MIN: CPT | Mod: HCNC,S$GLB,, | Performed by: FAMILY MEDICINE

## 2024-06-13 PROCEDURE — 3288F FALL RISK ASSESSMENT DOCD: CPT | Mod: HCNC,CPTII,S$GLB, | Performed by: FAMILY MEDICINE

## 2024-06-13 PROCEDURE — 3075F SYST BP GE 130 - 139MM HG: CPT | Mod: HCNC,CPTII,S$GLB, | Performed by: FAMILY MEDICINE

## 2024-06-13 NOTE — PROGRESS NOTES
"Subjective:       Patient ID: Castillo Anderson Jr. is a 78 y.o. male.    Chief Complaint: Follow-up    HPI:  Pleasant 70-year-old patient here today for follow-up.  There were some health maintenance issues that we touched upon.  Currently current smoker and needs LDCT and has pulmonary oversight.  The patient just had cervical spine surgery in his doing little bit better.  Patient currently being treated for cholesterol.  Has a history of ulcerative colitis and he is due for colonoscopy this year.  That has been ordered.  Does have some mild emphysema followed by Pulmonary.  He is due to see me back in 6 months.    Review of Systems   Constitutional: Negative.    HENT: Negative.     Eyes: Negative.    Respiratory: Negative.     Cardiovascular: Negative.    Gastrointestinal: Negative.    Endocrine: Negative.    Genitourinary: Negative.    Musculoskeletal: Negative.    Skin: Negative.    Allergic/Immunologic: Negative.    Neurological: Negative.    Hematological: Negative.    Psychiatric/Behavioral: Negative.         Objective:      Vitals:    06/13/24 0832   BP: 132/70   Pulse: 78   SpO2: 99%   Weight: 65.4 kg (144 lb 2.9 oz)   Height: 5' 6" (1.676 m)      Physical Exam  Vitals and nursing note reviewed.   Constitutional:       Appearance: Normal appearance. He is obese.   HENT:      Head: Normocephalic and atraumatic.      Nose: Nose normal.      Mouth/Throat:      Mouth: Mucous membranes are moist.      Pharynx: Oropharynx is clear.   Eyes:      Extraocular Movements: Extraocular movements intact.      Conjunctiva/sclera: Conjunctivae normal.      Pupils: Pupils are equal, round, and reactive to light.   Cardiovascular:      Rate and Rhythm: Normal rate and regular rhythm.   Pulmonary:      Effort: Pulmonary effort is normal.      Breath sounds: Normal breath sounds.   Abdominal:      General: Abdomen is flat. Bowel sounds are normal.      Palpations: Abdomen is soft.   Musculoskeletal:         General: Normal " range of motion.      Cervical back: Normal range of motion and neck supple.   Skin:     General: Skin is warm and dry.      Capillary Refill: Capillary refill takes less than 2 seconds.   Neurological:      General: No focal deficit present.      Mental Status: He is alert and oriented to person, place, and time.   Psychiatric:         Mood and Affect: Mood normal.         Behavior: Behavior normal.         Thought Content: Thought content normal.         Judgment: Judgment normal.         Results for orders placed or performed in visit on 10/27/23   CV Ultrasound doppler arterial legs bilat   Result Value Ref Range    Right CFA  cm/s    Right profunda sys  cm/s    Right super femoral prox sys  cm/s    Right super femoral mid sys PSV 98 cm/s    Right super femoral dist sys  cm/s    Right popliteal PSV 43 cm/s    Right ant tibial sys PSV 41 cm/s    Right post tibial sys PSV 47 cm/s    Right peroneal sys PSV 30 cm/s    Left CFA  cm/s    Left profunda sys  cm/s    Left super femoral prox sys  cm/s    Left super femoral mid sys  cm/s    Left super femoral dist sys PSV 89 cm/s    Left popliteal PSV 55 cm/s    Left ant tibial sys PSV 30 cm/s    Left post tibial sys PSV 39 cm/s    Left peroneal sys PSV 29 cm/s    Right LIZZIE 0.93     Right super femoral ostial sys .00 cm/s    Right tib/per trunk sys PSV 46.00 cm/s    Left LIZZIE 0.89     Left super femoral ostial sys .00 cm/s    Left tib/per trunk sys PSV 63.00 cm/s      Assessment:       1. DDD (degenerative disc disease), cervical    2. Mixed hyperlipidemia    3. Ulcerative colitis, universal, unspecified complication    4. Tobacco dependence    5. Centrilobular emphysema        Plan:       DDD (degenerative disc disease), cervical    Mixed hyperlipidemia    Ulcerative colitis, universal, unspecified complication  -     Case Request Endoscopy: COLONOSCOPY    Tobacco dependence  Comments:  Currently with  Pulmonary and LDCT is due.  Medicare will not approve it.    Centrilobular emphysema          Medication List with Changes/Refills   Current Medications    CLOPIDOGREL (PLAVIX) 75 MG TABLET    Take 1 tablet (75 mg total) by mouth once daily.    EZETIMIBE (ZETIA) 10 MG TABLET    Take 1 tablet (10 mg total) by mouth once daily.    FLUTICASONE-UMECLIDIN-VILANTER (TRELEGY ELLIPTA) 100-62.5-25 MCG DSDV    Inhale 1 puff into the lungs once daily.    MULTIVIT WITH MINERALS/LUTEIN (MULTIVITAMIN 50 PLUS ORAL)    multivitamin    NITROGLYCERIN (NITROSTAT) 0.4 MG SL TABLET        ROSUVASTATIN (CRESTOR) 20 MG TABLET    Take 1 tablet (20 mg total) by mouth once daily.    SERTRALINE (ZOLOFT) 50 MG TABLET        TRAZODONE (DESYREL) 50 MG TABLET    Take 50 mg by mouth every evening. Every day   Discontinued Medications    ROFLUMILAST (DALIRESP) 250 MCG TAB    Take 1 tablet (250 mcg total) by mouth once daily.

## 2024-06-17 ENCOUNTER — TELEPHONE (OUTPATIENT)
Dept: GASTROENTEROLOGY | Facility: CLINIC | Age: 79
End: 2024-06-17
Payer: MEDICARE

## 2024-06-19 ENCOUNTER — OFFICE VISIT (OUTPATIENT)
Dept: FAMILY MEDICINE | Facility: CLINIC | Age: 79
End: 2024-06-19
Payer: MEDICARE

## 2024-06-19 VITALS
HEART RATE: 69 BPM | WEIGHT: 145.94 LBS | OXYGEN SATURATION: 99 % | BODY MASS INDEX: 23.46 KG/M2 | DIASTOLIC BLOOD PRESSURE: 72 MMHG | SYSTOLIC BLOOD PRESSURE: 128 MMHG | HEIGHT: 66 IN

## 2024-06-19 DIAGNOSIS — F41.1 GAD (GENERALIZED ANXIETY DISORDER): ICD-10-CM

## 2024-06-19 DIAGNOSIS — J43.2 CENTRILOBULAR EMPHYSEMA: ICD-10-CM

## 2024-06-19 DIAGNOSIS — I65.22 STENOSIS OF LEFT CAROTID ARTERY: ICD-10-CM

## 2024-06-19 DIAGNOSIS — I70.0 THORACIC AORTA ATHEROSCLEROSIS: ICD-10-CM

## 2024-06-19 DIAGNOSIS — Z00.00 ENCOUNTER FOR PREVENTIVE HEALTH EXAMINATION: Primary | ICD-10-CM

## 2024-06-19 DIAGNOSIS — E78.2 MIXED HYPERLIPIDEMIA: ICD-10-CM

## 2024-06-19 DIAGNOSIS — D69.2 SENILE PURPURA: ICD-10-CM

## 2024-06-19 DIAGNOSIS — K51.019 ULCERATIVE COLITIS, UNIVERSAL, UNSPECIFIED COMPLICATION: ICD-10-CM

## 2024-06-19 PROCEDURE — G0439 PPPS, SUBSEQ VISIT: HCPCS | Mod: HCNC,S$GLB,, | Performed by: NURSE PRACTITIONER

## 2024-06-19 PROCEDURE — 3074F SYST BP LT 130 MM HG: CPT | Mod: HCNC,CPTII,S$GLB, | Performed by: NURSE PRACTITIONER

## 2024-06-19 PROCEDURE — 1159F MED LIST DOCD IN RCRD: CPT | Mod: HCNC,CPTII,S$GLB, | Performed by: NURSE PRACTITIONER

## 2024-06-19 PROCEDURE — 99999 PR PBB SHADOW E&M-EST. PATIENT-LVL IV: CPT | Mod: PBBFAC,HCNC,, | Performed by: NURSE PRACTITIONER

## 2024-06-19 PROCEDURE — 3288F FALL RISK ASSESSMENT DOCD: CPT | Mod: HCNC,CPTII,S$GLB, | Performed by: NURSE PRACTITIONER

## 2024-06-19 PROCEDURE — 1126F AMNT PAIN NOTED NONE PRSNT: CPT | Mod: HCNC,CPTII,S$GLB, | Performed by: NURSE PRACTITIONER

## 2024-06-19 PROCEDURE — 1124F ACP DISCUSS-NO DSCNMKR DOCD: CPT | Mod: HCNC,CPTII,S$GLB, | Performed by: NURSE PRACTITIONER

## 2024-06-19 PROCEDURE — 1101F PT FALLS ASSESS-DOCD LE1/YR: CPT | Mod: HCNC,CPTII,S$GLB, | Performed by: NURSE PRACTITIONER

## 2024-06-19 PROCEDURE — 3078F DIAST BP <80 MM HG: CPT | Mod: HCNC,CPTII,S$GLB, | Performed by: NURSE PRACTITIONER

## 2024-06-19 PROCEDURE — 1170F FXNL STATUS ASSESSED: CPT | Mod: HCNC,CPTII,S$GLB, | Performed by: NURSE PRACTITIONER

## 2024-06-19 PROCEDURE — 1160F RVW MEDS BY RX/DR IN RCRD: CPT | Mod: HCNC,CPTII,S$GLB, | Performed by: NURSE PRACTITIONER

## 2024-06-19 NOTE — PATIENT INSTRUCTIONS
Counseling and Referral of Other Preventative  (Italic type indicates deductible and co-insurance are waived)    Patient Name: Castillo Anderson  Today's Date: 6/19/2024    Health Maintenance       Date Due Completion Date    RSV Vaccine (Age 60+ and Pregnant patients) (1 - 1-dose 60+ series) Never done ---    Shingles Vaccine (2 of 3) 11/26/2015 10/1/2015    Colonoscopy 02/19/2024 2/19/2021    LDCT Lung Screen 05/04/2024 5/4/2023    Aspirin/Antiplatelet Therapy 06/13/2025 6/13/2024    TETANUS VACCINE 09/29/2026 9/29/2016 (Declined)    Override on 9/29/2016: Declined    Lipid Panel 09/06/2028 9/6/2023        No orders of the defined types were placed in this encounter.      The following information is provided to all patients.  This information is to help you find resources for any of the problems found today that may be affecting your health:                  Living healthy guide: www.[facility].[place].[facility]      Understanding Diabetes: www.diabetes.org      Eating healthy: www.cdc.gov/healthyweight      CDC home safety checklist: www.cdc.gov/steadi/patient.html      Agency on Aging: www.goea.[place].gov      Alcoholics anonymous (AA): www.aa.org      Physical Activity: www.pravin.nih.gov/xf2rjod      Tobacco use: www.quitwithusla.org

## 2024-06-19 NOTE — PROGRESS NOTES
"Castillo Anderson presented for a follow-up Medicare AWV today. The following components were reviewed and updated:    Medical history  Family History  Social history  Allergies and Current Medications  Health Risk Assessment  Health Maintenance  Care Team    **See Completed Assessments for Annual Wellness visit with in the encounter summary    The following assessments were completed:  Depression Screening  Cognitive function Screening    Timed Get Up Test  Whisper Test    Opioid documentation:    Patient does not have a current opioid prescription.        Vitals:    06/19/24 0743   BP: 128/72   BP Location: Left arm   Patient Position: Sitting   BP Method: Medium (Manual)   Pulse: 69   SpO2: 99%   Weight: 66.2 kg (145 lb 15.1 oz)   Height: 5' 6" (1.676 m)     Body mass index is 23.56 kg/m².       Physical Exam  Vitals reviewed.   Constitutional:       General: He is not in acute distress.  Pulmonary:      Effort: Pulmonary effort is normal. No respiratory distress.   Neurological:      Mental Status: He is alert and oriented to person, place, and time.   Psychiatric:         Mood and Affect: Mood normal.         Behavior: Behavior normal.         Thought Content: Thought content normal.         Judgment: Judgment normal.           Diagnoses and health risks identified today and associated recommendations/orders:  1. Encounter for preventive health examination  Reviewed and discussed health maintenance.    Plans to get RSV per pulmonary recommendation    2. Centrilobular emphysema  Stable- continue current treatment and follow up routinely with PCP and pulmonary ()  [place]   Repeat CT 12/2024 before pulmonary appt- ordered    3. Stenosis of left carotid artery  Stable- continue current treatment and follow up routinely with PCP and cardiology ()  Crestor, plavix, zetia    4. Mixed hyperlipidemia  Stable- continue current treatment and follow up routinely with PCP and cardiology " ()  Crestor, plavix, zetia  Lab Results   Component Value Date    CHOL 133 09/06/2023    CHOL 130 05/11/2021    CHOL 125 05/22/2020     Lab Results   Component Value Date    HDL 62 09/06/2023    HDL 54 05/11/2021    HDL 56 05/22/2020     Lab Results   Component Value Date    LDLCALC 57.4 (L) 09/06/2023    LDLCALC 59.6 (L) 05/11/2021    LDLCALC 54.0 (L) 05/22/2020     Lab Results   Component Value Date    TRIG 68 09/06/2023    TRIG 82 05/11/2021    TRIG 75 05/22/2020       Lab Results   Component Value Date    CHOLHDL 46.6 09/06/2023    CHOLHDL 41.5 05/11/2021    CHOLHDL 44.8 05/22/2020      5. Ulcerative colitis, universal, unspecified complication  Stable- continue current treatment and follow up routinely with PCP   Doing well. No complaints. Colonoscopy due. Orders in per pcp. Pt unsure if he will do because of age  Follow up with gi prn    6. Senile purpura  Stable- continue current treatment and follow up routinely with PCP     7. DWAYNE (generalized anxiety disorder)  Stable- continue current treatment and follow up routinely with PCP and psychiatry ()  Zoloft daily    8. Thoracic aorta atherosclerosis  Ct chest 3/2021  Stable- continue current treatment and follow up routinely with PCP and cardiology ()  Crestor, plavix, zetia  Bp and hld controlled    Provided Castillo with a 5-10 year written screening schedule and personal prevention plan. Recommendations were developed using the USPSTF age appropriate recommendations. Education, counseling, and referrals were provided as needed.  After Visit Summary printed and given to patient which includes a list of additional screenings\tests needed.    I offered to discuss advanced care planning, including how to pick a person who would make decisions for you if you were unable to make them for yourself, called a health care power of , and what kind of decisions you might make such as use of life sustaining treatments such as  ventilators and tube feeding when faced with a life limiting illness recorded on a living will that they will need to know. (How you want to be cared for as you near the end of your natural life)  Patient declined a discussion regarding advance directives at this time. He will complete independently with his family   Cindi Wood NP

## 2024-06-26 ENCOUNTER — TELEPHONE (OUTPATIENT)
Dept: GASTROENTEROLOGY | Facility: CLINIC | Age: 79
End: 2024-06-26
Payer: MEDICARE

## 2024-06-26 NOTE — TELEPHONE ENCOUNTER
Attempted to contact pt x 2 to schedule colonoscopy. No answer. Vmail left and phone number provided for call back. Letter mailed to pts home. Case request canceled.

## 2024-10-19 NOTE — TELEPHONE ENCOUNTER
Care Due:                  Date            Visit Type   Department     Provider  --------------------------------------------------------------------------------                                EP -                              [facility]  Last Visit: 06-      CARE ([facility])   SAMEER Mcneal                              EP -                              PRIMARY      [facility]  Next Visit: 12-      CARE ([facility])   SAMEER Mcneal                                                            Last  Test          Frequency    Reason                     Performed    Due Date  --------------------------------------------------------------------------------    CBC.........  12 months..  clopidogreL..............  09- 08-    CMP.........  12 months..  ezetimibe, rosuvastatin..  09- 09-    Lipid Panel.  12 months..  ezetimibe, rosuvastatin..  09- 09-    [facility] Embedded Care Due Messages. Reference number: [id number].   10/19/2024 5:19:38 AM CDT

## 2024-10-20 RX ORDER — CLOPIDOGREL BISULFATE 75 MG/1
75 TABLET ORAL
Qty: 90 TABLET | Refills: 3 | Status: SHIPPED | OUTPATIENT
Start: 2024-10-20

## 2024-10-20 NOTE — TELEPHONE ENCOUNTER
Refill Routing Note   Medication(s) are not appropriate for processing by [facility] for the following reason(s):        Required labs outdated    [facility] action(s):  Defer     Requires labs : Yes             Appointments  past 12m or future 3m with PCP    Date Provider   Last Visit   6/13/2024 Chaitanya Mcneal MD   Next Visit   12/18/2024 Chaitanya Mcneal MD   ED visits in past 90 days: 0        Note composed:6:51 AM 10/20/2024

## 2024-11-07 DIAGNOSIS — E78.5 HLD (HYPERLIPIDEMIA): ICD-10-CM

## 2024-11-07 RX ORDER — EZETIMIBE 10 MG/1
10 TABLET ORAL
Qty: 90 TABLET | Refills: 3 | Status: SHIPPED | OUTPATIENT
Start: 2024-11-07

## 2024-11-07 NOTE — TELEPHONE ENCOUNTER
Refill Routing Note   Medication(s) are not appropriate for processing by [facility] for the following reason(s):        Required labs outdated    [facility] action(s):  Defer               Appointments  past 12m or future 3m with PCP    Date Provider   Last Visit   6/13/2024 Chaitanya Mcneal MD   Next Visit   12/18/2024 Chaitanya Mcneal MD   ED visits in past 90 days: 0        Note composed:4:44 AM 11/07/2024

## 2024-11-07 NOTE — TELEPHONE ENCOUNTER
No care due was identified.  [facility] Embedded Care Due Messages. Reference number: [id number].   11/07/2024 1:57:05 AM CST

## 2024-11-22 RX ORDER — ROSUVASTATIN CALCIUM 20 MG/1
20 TABLET, COATED ORAL
Qty: 90 TABLET | Refills: 3 | Status: SHIPPED | OUTPATIENT
Start: 2024-11-22

## 2024-11-22 NOTE — TELEPHONE ENCOUNTER
Refill Routing Note   Medication(s) are not appropriate for processing by [facility] for the following reason(s):        Required labs outdated    [facility] action(s):  Defer               Appointments  past 12m or future 3m with PCP    Date Provider   Last Visit   6/13/2024 Chaitanya Mcneal MD   Next Visit   12/18/2024 Chaitanya Mcneal MD   ED visits in past 90 days: 0        Note composed:7:26 AM 11/22/2024

## 2024-11-22 NOTE — TELEPHONE ENCOUNTER
No care due was identified.  [facility] Embedded Care Due Messages. Reference number: [id number].   11/22/2024 2:01:55 AM CST

## 2024-12-18 PROBLEM — R91.1 LUNG NODULE: Status: ACTIVE | Noted: 2018-01-25

## 2025-05-27 ENCOUNTER — TELEPHONE (OUTPATIENT)
Dept: VASCULAR SURGERY | Facility: CLINIC | Age: 80
End: 2025-05-27
Payer: MEDICARE

## 2025-05-27 DIAGNOSIS — I65.23 BILATERAL CAROTID ARTERY STENOSIS: ICD-10-CM

## 2025-05-27 DIAGNOSIS — Z98.890 S/P CAROTID ENDARTERECTOMY: Primary | ICD-10-CM

## 2025-05-27 DIAGNOSIS — I77.9 CAROTID ARTERY DISEASE, UNSPECIFIED LATERALITY, UNSPECIFIED TYPE: ICD-10-CM

## 2025-05-27 NOTE — TELEPHONE ENCOUNTER
LMOR- orders for carotid us placed, he can call back to schedule at his convience and will call him with Dr Schulte's recommendations.    ----- Message from Daniella sent at 5/27/2025 11:47 AM CDT -----  Contact: PT  Type: Apoointment RequestName of Caller:PT When is the first available appointment?N/ASymptoms:EST CARE - VERIFY THAT VEINS ARE CLEARLEFT CORORTID ARTERY CLEAN OUT SEVERAL YRS AGO Would the patient rather a call back or a response via [facility]? CALL Best Call Back Number:[phone] Information: THANK YOU

## 2025-05-28 ENCOUNTER — RESULTS FOLLOW-UP (OUTPATIENT)
Dept: CARDIOLOGY | Facility: CLINIC | Age: 80
End: 2025-05-28

## 2025-05-28 ENCOUNTER — HOSPITAL ENCOUNTER (OUTPATIENT)
Dept: RADIOLOGY | Facility: HOSPITAL | Age: 80
Discharge: HOME | End: 2025-05-28
Attending: THORACIC SURGERY (CARDIOTHORACIC VASCULAR SURGERY)
Payer: MEDICARE

## 2025-05-28 DIAGNOSIS — I65.23 BILATERAL CAROTID ARTERY STENOSIS: ICD-10-CM

## 2025-05-28 DIAGNOSIS — Z98.890 S/P CAROTID ENDARTERECTOMY: ICD-10-CM

## 2025-05-28 PROCEDURE — 93880 EXTRACRANIAL BILAT STUDY: CPT | Mod: TC,PO

## 2025-05-28 PROCEDURE — 93880 EXTRACRANIAL BILAT STUDY: CPT | Mod: 26,,, | Performed by: RADIOLOGY

## 2025-05-29 DIAGNOSIS — Z98.890 S/P CAROTID ENDARTERECTOMY: Primary | ICD-10-CM

## 2025-05-29 DIAGNOSIS — I77.9 CAROTID ARTERY DISEASE, UNSPECIFIED LATERALITY, UNSPECIFIED TYPE: ICD-10-CM

## 2025-05-29 DIAGNOSIS — I65.23 BILATERAL CAROTID ARTERY STENOSIS: ICD-10-CM

## 2025-06-04 ENCOUNTER — TELEPHONE (OUTPATIENT)
Dept: FAMILY MEDICINE | Facility: CLINIC | Age: 80
End: 2025-06-04
Payer: MEDICARE

## 2025-08-19 ENCOUNTER — PATIENT MESSAGE (OUTPATIENT)
Dept: ADMINISTRATIVE | Facility: HOSPITAL | Age: 80
End: 2025-08-19
Payer: MEDICARE

## (undated) DEVICE — NDL TUOHY EPIDURAL 20G X 3.5

## (undated) DEVICE — SYR GLASS 5CC LUER LOK

## (undated) DEVICE — GLOVE SURGICAL LATEX SZ 7

## (undated) DEVICE — APPLICATOR CHLORAPREP CLR 10.5

## (undated) DEVICE — MARKER SKIN STND TIP BLUE BARR

## (undated) DEVICE — TRAY NERVE BLOCK